# Patient Record
Sex: FEMALE | Race: WHITE | NOT HISPANIC OR LATINO | Employment: FULL TIME | URBAN - METROPOLITAN AREA
[De-identification: names, ages, dates, MRNs, and addresses within clinical notes are randomized per-mention and may not be internally consistent; named-entity substitution may affect disease eponyms.]

---

## 2017-01-24 ENCOUNTER — ALLSCRIPTS OFFICE VISIT (OUTPATIENT)
Dept: OTHER | Facility: OTHER | Age: 62
End: 2017-01-24

## 2017-04-21 ENCOUNTER — ALLSCRIPTS OFFICE VISIT (OUTPATIENT)
Dept: OTHER | Facility: OTHER | Age: 62
End: 2017-04-21

## 2017-05-08 ENCOUNTER — GENERIC CONVERSION - ENCOUNTER (OUTPATIENT)
Dept: OTHER | Facility: OTHER | Age: 62
End: 2017-05-08

## 2017-05-10 ENCOUNTER — ALLSCRIPTS OFFICE VISIT (OUTPATIENT)
Dept: OTHER | Facility: OTHER | Age: 62
End: 2017-05-10

## 2017-07-05 ENCOUNTER — ALLSCRIPTS OFFICE VISIT (OUTPATIENT)
Dept: OTHER | Facility: OTHER | Age: 62
End: 2017-07-05

## 2017-07-09 ENCOUNTER — GENERIC CONVERSION - ENCOUNTER (OUTPATIENT)
Dept: OTHER | Facility: OTHER | Age: 62
End: 2017-07-09

## 2017-07-26 ENCOUNTER — TRANSCRIBE ORDERS (OUTPATIENT)
Dept: ADMINISTRATIVE | Facility: HOSPITAL | Age: 62
End: 2017-07-26

## 2017-07-26 DIAGNOSIS — M54.41 ACUTE BACK PAIN WITH SCIATICA, RIGHT: Primary | ICD-10-CM

## 2017-07-31 ENCOUNTER — HOSPITAL ENCOUNTER (OUTPATIENT)
Dept: CT IMAGING | Facility: HOSPITAL | Age: 62
Discharge: HOME | End: 2017-07-31
Payer: COMMERCIAL

## 2017-07-31 ENCOUNTER — GENERIC CONVERSION - ENCOUNTER (OUTPATIENT)
Dept: OTHER | Facility: OTHER | Age: 62
End: 2017-07-31

## 2017-07-31 DIAGNOSIS — M54.41 LOW BACK PAIN WITH RIGHT-SIDED SCIATICA: ICD-10-CM

## 2017-07-31 DIAGNOSIS — M54.41 ACUTE BACK PAIN WITH SCIATICA, RIGHT: ICD-10-CM

## 2017-07-31 PROCEDURE — 72131 CT LUMBAR SPINE W/O DYE: CPT

## 2017-08-21 ENCOUNTER — APPOINTMENT (OUTPATIENT)
Dept: PHYSICAL THERAPY | Facility: REHABILITATION | Age: 62
End: 2017-08-21
Payer: COMMERCIAL

## 2017-08-21 DIAGNOSIS — M54.41 LOW BACK PAIN WITH RIGHT-SIDED SCIATICA: ICD-10-CM

## 2017-08-21 PROCEDURE — G8978 MOBILITY CURRENT STATUS: HCPCS | Performed by: PHYSICAL THERAPIST

## 2017-08-21 PROCEDURE — 97110 THERAPEUTIC EXERCISES: CPT

## 2017-08-21 PROCEDURE — 97162 PT EVAL MOD COMPLEX 30 MIN: CPT

## 2017-08-21 PROCEDURE — G8979 MOBILITY GOAL STATUS: HCPCS | Performed by: PHYSICAL THERAPIST

## 2017-08-21 PROCEDURE — 97012 MECHANICAL TRACTION THERAPY: CPT

## 2017-08-23 ENCOUNTER — APPOINTMENT (OUTPATIENT)
Dept: PHYSICAL THERAPY | Facility: REHABILITATION | Age: 62
End: 2017-08-23
Payer: COMMERCIAL

## 2017-08-23 PROCEDURE — 97110 THERAPEUTIC EXERCISES: CPT

## 2017-08-23 PROCEDURE — 97012 MECHANICAL TRACTION THERAPY: CPT

## 2017-08-28 ENCOUNTER — APPOINTMENT (OUTPATIENT)
Dept: PHYSICAL THERAPY | Facility: REHABILITATION | Age: 62
End: 2017-08-28
Payer: COMMERCIAL

## 2017-09-19 ENCOUNTER — GENERIC CONVERSION - ENCOUNTER (OUTPATIENT)
Dept: OTHER | Facility: OTHER | Age: 62
End: 2017-09-19

## 2017-11-15 ENCOUNTER — ALLSCRIPTS OFFICE VISIT (OUTPATIENT)
Dept: OTHER | Facility: OTHER | Age: 62
End: 2017-11-15

## 2017-11-17 NOTE — PROGRESS NOTES
Assessment    1  Hypertension (401 9) (I10)   2  Walking pneumonia (486) (J18 9)   3  Bilateral impacted cerumen (380 4) (H61 23)    Plan  Bilateral impacted cerumen    · Removal Impacted Cerumen Using Irrigation / Lavage one or both ears - POC;Status:Complete;   Done: 51DIT3496  Hypertension    · Metoprolol Succinate ER 25 MG Oral Tablet Extended Release 24 Hour (Toprol XL)   · Lisinopril 10 MG Oral Tablet; TAKE 1 TABLET DAILY   · Follow-up visit in 2 weeks Evaluation and Treatment  Follow-up  Status: Hold For -Scheduling  Requested for: 19PDN2328  Walking pneumonia    · Amoxicillin 500 MG Oral Capsule; TAKE 1 CAPSULE TWICE DAILY UNTIL GONE   · Promethazine-Codeine 6 25-10 MG/5ML Oral Syrup; TAKE 5 ML EVERY 4 TO 6HOURS AS NEEDED FOR COUGH    Discussion/Summary    Antibiotics prescribed and cough medicationchanged medication from metoprolol to lisinopril  follow up in 2 weeks  BP log given  Possible side effects of new medications were reviewed with the patient/guardian today  The treatment plan was reviewed with the patient/guardian  The patient/guardian understands and agrees with the treatment plan      Chief Complaint  Patient c/o clogged ear, cough, headache  difficulty sleeping  History of Present Illness  HPI: anxious because new baby is on the way and she feels lightheaded as well   Cough:   Arcelia Bennett presents with complaints of sudden onset of moderate cough, described as moist starting 1 week ago Symptoms are unchanged (she was told it was viral, cant take nyquil, benadryl because it makes her hyper  )  Associated symptoms include pleuritic chest pain, but-- no fever,-- no sore throat,-- no chest pain-- and-- no night sweats  Review of Systems   Cardiovascular: no chest pain,-- the heart rate was not fast-- and-- no palpitations  Respiratory: cough, but-- as noted in HPI  Neurological: headache-- and-- dizziness  ROS reviewed  Active Problems    1   Acute right-sided low back pain with right-sided sciatica (724 2,724 3) (M54 41)   2  Allergic reaction (995 3) (T78 40XA)   3  Anxiety (300 00) (F41 9)   4  Cold sore (054 9) (B00 1)   5  Encounter for routine gynecological examination (V72 31) (Z01 419)   6  Encounter for screening for malignant neoplasm of colon (V76 51) (Z12 11)   7  Encounter for screening mammogram for malignant neoplasm of breast (V76 12) (Z12 31)   8  Hypertension (401 9) (I10)   9  Need for Tdap vaccination (V06 1) (Z23)   10  Other insomnia (780 52) (G47 09)   11  Panic attack (300 01) (F41 0)   12  Spinal stenosis of lumbar region (724 02) (M48 061)    Past Medical History  1  History of Denial Of Any Significant Medical History  Active Problems And Past Medical History Reviewed: The active problems and past medical history were reviewed and updated today  Family History  Mother    1  Family history of Atrial Myocardial Infarction  Family History Reviewed: The family history was reviewed and updated today  Social History   · Being A Social Drinker   · Current Every Day Smoker (305 1)   · Denied: History of Drug Use   · Full-time employment   · Denied: History of domestic violence   · House   · Lives with family   ·    · No advance directives (V49 89) (Z78 9)   · No living will   · [employer]   · Supportive and safe   · Two children   · 1 grandchild  The social history was reviewed and is unchanged  Surgical History    1  History of  Section   2  History of Tonsillectomy  Surgical History Reviewed: The surgical history was reviewed and updated today  Current Meds   1  Metoprolol Succinate ER 25 MG Oral Tablet Extended Release 24 Hour; TAKE 1 TABLET DAILY; Therapy: 73IGZ5501 to (Pretty Esquivel)  Requested for: 85QBC7706; Last Rx:27Hze1860 Ordered    The medication list was reviewed and updated today  Allergies  1  Gabapentin CAPS   2   Benadryl Allergy TABS    Vitals   Recorded: 44EHS2671 06:06PM   Temperature 98 7 F Heart Rate 84   Respiration 18   Systolic 334   Diastolic 505   Height 5 ft 6 in   Weight 152 lb 9 6 oz   BMI Calculated 24 63   BSA Calculated 1 79       Physical Exam   Constitutional  General appearance: No acute distress, well appearing and well nourished  Psychiatric  Orientation to person, place, and time: Normal    Mood and affect: Abnormal   Mood and Affect: anxious  Results/Data  PHQ-9 Adult Depression Screening 62QDE6473 06:11PM User, Lyudmilas     Test Name Result Flag Reference   PHQ-9 Adult Depression Score 9       Over the last two weeks, how often have you been bothered by any of the following problems? Little interest or pleasure in doing things: Several days - 1 Feeling down, depressed, or hopeless: Several days - 1 Trouble falling or staying asleep, or sleeping too much: Nearly every day - 3 Feeling tired or having little energy: More than half the days - 2 Poor appetite or over eating: More than half the days - 2 Feeling bad about yourself - or that you are a failure or have let yourself or your family down: Not at all - 0 Trouble concentrating on things, such as reading the newspaper or watching television: Not at all - 0 Moving or speaking so slowly that other people could have noticed  Or the opposite -  being so fidgety or restless that you have been moving around a lot more than usual: Not at all - 0 Thoughts that you would be better off dead, or of hurting yourself in some way: Not at all - 0   PHQ-9 Adult Depression Screening Negative     PHQ-9 Difficulty Level Not difficult at all     PHQ-9 Severity Mild Depression           Procedure           Procedure: cerumen removal   Indication: cerumen impaction in both ears  Prep: hydrogen peroxide was placed in the canal prior to the procedure  Procedure Note: The procedure was performed by MA  A otoscope was placed in the ear canal(s) to visualize the ear canal debris  The ear was cleaned by using warm water irrigation   The procedure was partially successful  Post-Procedure:  Patient Status: the patient tolerated the procedure well  Complications: there were no complications  Patient instructions: avoid using q-tips-- and-- use debrox drops for additional removal             Future Appointments    Date/Time Provider Specialty Site   11/29/2017 04:30 PM Sudhir Chamorro[address]       Signatures   Electronically signed by : Ruth Corbin; Nov 15 2017  6:36PM EST                       (Author)    Electronically signed by :  Sivakumar Bauer MD; Nov 16 2017  8:16AM EST                       (Author)

## 2017-11-29 ENCOUNTER — GENERIC CONVERSION - ENCOUNTER (OUTPATIENT)
Dept: OTHER | Facility: OTHER | Age: 62
End: 2017-11-29

## 2017-12-06 ENCOUNTER — GENERIC CONVERSION - ENCOUNTER (OUTPATIENT)
Dept: OTHER | Facility: OTHER | Age: 62
End: 2017-12-06

## 2017-12-07 ENCOUNTER — HOSPITAL ENCOUNTER (OUTPATIENT)
Dept: RADIOLOGY | Facility: HOSPITAL | Age: 62
Discharge: HOME | End: 2017-12-07
Payer: COMMERCIAL

## 2017-12-07 ENCOUNTER — APPOINTMENT (OUTPATIENT)
Dept: LAB | Facility: HOSPITAL | Age: 62
End: 2017-12-07
Payer: COMMERCIAL

## 2017-12-07 DIAGNOSIS — R05.9 COUGH: ICD-10-CM

## 2017-12-07 DIAGNOSIS — J18.9 PNEUMONIA: ICD-10-CM

## 2017-12-07 LAB
ALBUMIN SERPL BCP-MCNC: 3.7 G/DL (ref 3.5–5)
ALP SERPL-CCNC: 60 U/L (ref 46–116)
ALT SERPL W P-5'-P-CCNC: 20 U/L (ref 12–78)
ANION GAP SERPL CALCULATED.3IONS-SCNC: 8 MMOL/L (ref 4–13)
AST SERPL W P-5'-P-CCNC: 10 U/L (ref 5–45)
BASOPHILS # BLD AUTO: 0.02 THOUSANDS/ΜL (ref 0–0.1)
BASOPHILS NFR BLD AUTO: 0 % (ref 0–1)
BILIRUB SERPL-MCNC: 0.36 MG/DL (ref 0.2–1)
BUN SERPL-MCNC: 16 MG/DL (ref 5–25)
CALCIUM SERPL-MCNC: 9.2 MG/DL (ref 8.3–10.1)
CHLORIDE SERPL-SCNC: 105 MMOL/L (ref 100–108)
CO2 SERPL-SCNC: 26 MMOL/L (ref 21–32)
CREAT SERPL-MCNC: 0.58 MG/DL (ref 0.6–1.3)
EOSINOPHIL # BLD AUTO: 0.1 THOUSAND/ΜL (ref 0–0.61)
EOSINOPHIL NFR BLD AUTO: 2 % (ref 0–6)
ERYTHROCYTE [DISTWIDTH] IN BLOOD BY AUTOMATED COUNT: 12.2 % (ref 11.6–15.1)
GFR SERPL CREATININE-BSD FRML MDRD: 101 ML/MIN/1.73SQ M
GLUCOSE SERPL-MCNC: 99 MG/DL (ref 65–140)
HCT VFR BLD AUTO: 41.5 % (ref 34.8–46.1)
HGB BLD-MCNC: 14.2 G/DL (ref 11.5–15.4)
LYMPHOCYTES # BLD AUTO: 2.01 THOUSANDS/ΜL (ref 0.6–4.47)
LYMPHOCYTES NFR BLD AUTO: 30 % (ref 14–44)
MCH RBC QN AUTO: 32.5 PG (ref 26.8–34.3)
MCHC RBC AUTO-ENTMCNC: 34.2 G/DL (ref 31.4–37.4)
MCV RBC AUTO: 95 FL (ref 82–98)
MONOCYTES # BLD AUTO: 0.41 THOUSAND/ΜL (ref 0.17–1.22)
MONOCYTES NFR BLD AUTO: 6 % (ref 4–12)
NEUTROPHILS # BLD AUTO: 4.17 THOUSANDS/ΜL (ref 1.85–7.62)
NEUTS SEG NFR BLD AUTO: 62 % (ref 43–75)
NRBC BLD AUTO-RTO: 0 /100 WBCS
PLATELET # BLD AUTO: 220 THOUSANDS/UL (ref 149–390)
PMV BLD AUTO: 9.7 FL (ref 8.9–12.7)
POTASSIUM SERPL-SCNC: 3.7 MMOL/L (ref 3.5–5.3)
PROT SERPL-MCNC: 6.8 G/DL (ref 6.4–8.2)
RBC # BLD AUTO: 4.37 MILLION/UL (ref 3.81–5.12)
SODIUM SERPL-SCNC: 139 MMOL/L (ref 136–145)
WBC # BLD AUTO: 6.71 THOUSAND/UL (ref 4.31–10.16)

## 2017-12-07 PROCEDURE — 80053 COMPREHEN METABOLIC PANEL: CPT

## 2017-12-07 PROCEDURE — 71020 HB CHEST X-RAY 2VW FRONTAL&LATL: CPT

## 2017-12-07 PROCEDURE — 36415 COLL VENOUS BLD VENIPUNCTURE: CPT

## 2017-12-07 PROCEDURE — 85025 COMPLETE CBC W/AUTO DIFF WBC: CPT

## 2017-12-08 ENCOUNTER — GENERIC CONVERSION - ENCOUNTER (OUTPATIENT)
Dept: OTHER | Facility: OTHER | Age: 62
End: 2017-12-08

## 2018-01-02 ENCOUNTER — GENERIC CONVERSION - ENCOUNTER (OUTPATIENT)
Dept: OTHER | Facility: OTHER | Age: 63
End: 2018-01-02

## 2018-01-08 DIAGNOSIS — J18.9 PNEUMONIA: ICD-10-CM

## 2018-01-10 NOTE — MISCELLANEOUS
Message  Return to work or school:   Tito Smith is under my professional care   She was seen in my office on 1/24/17   She is able to return to work on  1/28/17            Signatures   Electronically signed by : Rahul Ng DO; Jan 24 2017  1:20PM EST                       (Author)    Electronically signed by : Rahul Ng DO; Jan 31 2017  8:25PM EST                       (Author)

## 2018-01-12 NOTE — MISCELLANEOUS
Message  Return to work or school:   Marina Montoya is under my professional care  She was seen in my office on 6/15/16   She is able to return to work on  6/20/16            Signatures   Electronically signed by :  Dionne Ordonez MD; Jun 16 2016  9:52AM EST                       (Author)

## 2018-01-13 VITALS
TEMPERATURE: 97.6 F | RESPIRATION RATE: 18 BRPM | OXYGEN SATURATION: 98 % | BODY MASS INDEX: 24.11 KG/M2 | DIASTOLIC BLOOD PRESSURE: 100 MMHG | WEIGHT: 150 LBS | SYSTOLIC BLOOD PRESSURE: 148 MMHG | HEIGHT: 66 IN | HEART RATE: 74 BPM

## 2018-01-13 VITALS
DIASTOLIC BLOOD PRESSURE: 110 MMHG | HEART RATE: 84 BPM | HEIGHT: 66 IN | SYSTOLIC BLOOD PRESSURE: 176 MMHG | BODY MASS INDEX: 24.53 KG/M2 | TEMPERATURE: 98.7 F | WEIGHT: 152.6 LBS | RESPIRATION RATE: 18 BRPM

## 2018-01-13 VITALS
DIASTOLIC BLOOD PRESSURE: 74 MMHG | BODY MASS INDEX: 23.59 KG/M2 | SYSTOLIC BLOOD PRESSURE: 124 MMHG | WEIGHT: 146.8 LBS | TEMPERATURE: 98.5 F | HEIGHT: 66 IN | HEART RATE: 82 BPM

## 2018-01-13 VITALS
SYSTOLIC BLOOD PRESSURE: 150 MMHG | RESPIRATION RATE: 16 BRPM | DIASTOLIC BLOOD PRESSURE: 96 MMHG | HEIGHT: 66 IN | WEIGHT: 148 LBS | HEART RATE: 86 BPM | BODY MASS INDEX: 23.78 KG/M2

## 2018-01-13 NOTE — RESULT NOTES
Verified Results  * CT SPINE LUMBAR WO CONTRAST 51PKQ4998 06:35PM Dion Vargas     Test Name Result Flag Reference   CT SPINE LUMBAR WO CONTRAST (Report)     CT LUMBAR SPINE     INDICATION: M54 41: Lumbago with sciatica, right side  History taken directly from the electronic ordering system  COMPARISON: None available     TECHNIQUE: Contiguous axial images through the lumbar spine were obtained  Sagittal and coronal reconstructions were performed  Radiation dose length product (DLP) for this visit: 526 mGy-cm   This examination, like all CT scans performed in the [facility], was performed utilizing techniques to minimize radiation dose exposure, including the use of iterative    reconstruction and automated exposure control  IMAGE QUALITY: Diagnostic  FINDINGS:   Counting reference: Lumbosacral Junction For the purposes of this report, L4-5 is considered the level of the iliac crest       ALIGNMENT: No focal lytic or blastic lesion  There is no acute fracture  Vertebral body hemangioma seen within L3  Vertebral body heights are maintained  No evidence of prior surgical intervention  VERTEBRAL BODIES:        DEGENERATIVE CHANGES:     Lower Thoracic spine: Normal lower thoracic disc spaces ]     L1-2: No significant spinal canal stenosis  No right and no left neural foraminal stenosis  L2-3: Right foraminal/extra foraminal protrusion  No significant spinal canal stenosis  Mild right and no significant left neural foraminal stenosis  L3-4: Circumferential disc bulge  No significant spinal canal stenosis  Mild right and mild left neural foraminal stenosis  L4-5: Bulging of the annulus  Right foraminal extrusion with superior migration  Moderate right and mild left neural foraminal stenosis  L5-S1: Bulging of the annulus  Ligament flavum thickening  No significant spinal canal stenosis  Mild right and mild to moderate left neural foraminal stenosis  PARASPINAL SOFT TISSUES:  Normal        IMPRESSION:     Multilevel spondylosis as detailed level by level, most significant for the following:     Right foraminal and extraforaminal protrusion at L2-L3 resulting in mild right foraminal stenosis  Right foraminal extrusion at L4-L5 resulting in moderate right foraminal stenosis  Otherwise, mild degrees of stenosis as detailed level by level  Workstation performed: [id number]     Signed by:   Lucretia Hampton MD   8/1/17       Plan  Acute right-sided low back pain with right-sided sciatica, Spinal stenosis of lumbar region    · *1 -  [facility]  *  Status: Active  Requested for:  86Nzt1931  Care Summary provided  : Yes  Allergic reaction    · MethylPREDNISolone 4 MG Oral Tablet Therapy Pack (Medrol);  Take as directed  on pack

## 2018-01-14 VITALS
DIASTOLIC BLOOD PRESSURE: 90 MMHG | HEART RATE: 86 BPM | BODY MASS INDEX: 23.36 KG/M2 | WEIGHT: 145.38 LBS | OXYGEN SATURATION: 98 % | HEIGHT: 66 IN | RESPIRATION RATE: 16 BRPM | SYSTOLIC BLOOD PRESSURE: 130 MMHG

## 2018-01-16 NOTE — MISCELLANEOUS
Message  pt called today and is having increased difficulty with rt leg and back pain in spite of steroids and exercise program  Rt leg pain and tingling persists   not able to sit to work or get good sleep  will attempt ct lumbar spine to evaluate lumbar spine for abnormality      Signatures   Electronically signed by : Yumi Fernandez DO; Jul 25 2017  3:20PM EST                       (Author)

## 2018-01-18 NOTE — PROGRESS NOTES
Assessment    1  Encounter for preventive health examination (V70 0) (Z00 00)    Plan  Anxiety, Health Maintenance    · (1) LIPID PANEL FASTING W DIRECT LDL REFLEX; Status:Active; Requested  for:01Xgy2820; Health Maintenance    · (1) COMPREHENSIVE METABOLIC PANEL; Status:Active; Requested for:68Lhx7229;    · (1) C-REACTIVE PROTEIN, HIGH SENSITIVITY; Status:Active; Requested  for:12Ile9575; Health Maintenance, Other insomnia    · (1) TSH; Status:Active; Requested for:56Nup2788;     Discussion/Summary  healthy adult female Currently, she eats an adequate diet and has an adequate exercise regimen  cervical cancer screening is current Breast cancer screening: mammogram has been ordered  Colorectal cancer screening: colorectal cancer screening is current  Osteoporosis screening: bone mineral density testing is not indicated  The immunizations are up to date  Advice and education were given regarding aerobic exercise, weight bearing exercise, calcium supplements, vitamin D supplements, cardiovascular risk reduction, tobacco cessation, sunscreen use, self skin examination, seat belt use and fall risk reduction  Await lab  Chief Complaint  pt is here for routine PE -brother-in-law passed of heart attack in June      Advance Directives  Advance Directive [facility]:   The patient is not in agreement to receive the Advance Care Planning service   Capacity/Competence: This patient has full decision making capacity for discussion of advance care planning and This patient has full decision making competency for discussion of advance care planning  Summary of Advance Directive Conversation  declines at this time  History of Present Illness  HM, Adult Female: The patient is being seen for a health maintenance evaluation  The last health maintenance visit was 2 year(s) ago  Social History: Household members include spouse  She is   Work status: working full time   The patient is a current cigarette smoker  She reports frequent alcohol use and drinking 2 drinks per day  She has never used illicit drugs  General Health: The patient's health since the last visit is described as good  She has regular dental visits  Immunizations status: up to date  Lifestyle:  She does not have a healthy diet  She has weight concerns  She exercises regularly  Exercise includes volleyball  Reproductive health: the patient is postmenopausal   she is sexually active  pregnancy history: G 2P 2  Screening: cancer screening reviewed and current  Cervical cancer screening includes a pap smear performed 2014  Breast cancer screening includes a mammogram performed last year  Colorectal cancer screening includes a colonoscopy performed within the past ten years  (2014)  metabolic screening reviewed and current  Metabolic screening includes no previous lipid profile, no previous glucose screening and no previous thyroid function test    risk screening reviewed and current  Safety elements used: seat belt, safe driving habits, sunscreen and smoke detector, but no carbon monoxide detector  Risk assessments performed include depression symptoms  Risk findings: no guns at home, no travel to developing areas and no tuberculosis exposure  Review of Systems    Constitutional: recent several pounds lb weight gain and feeling tired, but not feeling poorly  Cardiovascular: palpitations, but no chest pain  Respiratory: shortness of breath  Neurological: no headache  Psychiatric: anxiety and sleep disturbances  Active Problems    1  Acute URI (465 9) (J06 9)   2  Cold sore (054 9) (B00 1)   3  Diarrhea (787 91) (R19 7)   4  Encounter for routine gynecological examination (V72 31) (Z01 419)   5  Encounter for screening for malignant neoplasm of colon (V76 51) (Z12 11)   6  Encounter for screening mammogram for malignant neoplasm of breast (V76 12)   (Z12 31)   7  Need for Tdap vaccination (V06 1) (Z23)   8   Other insomnia (780 52) (G47 09)    Past Medical History    · History of Denial Of Any Significant Medical History    Surgical History    · History of  Section   · History of Tonsillectomy    Family History  Mother    · Family history of Atrial Myocardial Infarction    Social History    · Being A Social Drinker   · Current Every Day Smoker (305 1)   · Denied: History of Drug Use   · Full-time employment   · Denied: History of domestic violence   ·    · Two children   · 1 grandchild    Current Meds   1  No Reported Medications Recorded    Allergies    1  No Known Drug Allergies    Vitals   Recorded: 13WQC1982 85:43UP   Systolic 723   Diastolic 70   Heart Rate 78   Height 5 ft 6 in   Weight 152 lb 6 08 oz   BMI Calculated 24 59   BSA Calculated 1 79     Physical Exam    Constitutional   General appearance: No acute distress, well appearing and well nourished  Ears, Nose, Mouth, and Throat   Otoscopic examination: Tympanic membranes translucent with normal light reflex  Canals patent without erythema  Oropharynx: Normal with no erythema, edema, exudate or lesions  Neck   Thyroid: Normal, no thyromegaly  Pulmonary   Auscultation of lungs: Clear to auscultation  Cardiovascular   Auscultation of heart: Normal rate and rhythm, normal S1 and S2, no murmurs  Lymphatic   Palpation of lymph nodes in neck: No lymphadenopathy  Signatures   Electronically signed by :  Tanya Manzano MD; Aug 22 2016  8:09AM EST                       (Author)

## 2018-01-22 VITALS
SYSTOLIC BLOOD PRESSURE: 140 MMHG | HEART RATE: 78 BPM | WEIGHT: 151.13 LBS | BODY MASS INDEX: 24.29 KG/M2 | TEMPERATURE: 98.3 F | DIASTOLIC BLOOD PRESSURE: 90 MMHG | HEIGHT: 66 IN | RESPIRATION RATE: 18 BRPM

## 2018-01-23 NOTE — PROGRESS NOTES
Assessment   1  Viral syndrome (079 99) (B34 9)    Plan  Acute URI    · Renew: Promethazine-Codeine 6 25-10 MG/5ML Oral Syrup; TAKE 5 - 10 ML EVERY 4 TO  6 HOURS AS NEEDED1      1 Amended By: Timo Asher; Jan 31 2017 8:24 PM EST    Chief Complaint  head and chest congestion for the past week along with cough & sore throat/vomiting and diarrhea since last night      History of Present Illness  HPI: past week cough congestion sore throat last night vomiting and diarrhea      Review of Systems    Constitutional: feeling poorly  ENT: no ear ache, no loss of hearing, no nosebleeds or nasal discharge, no sore throat or hoarseness  Cardiovascular: no complaints of slow or fast heart rate, no chest pain, no palpitations, no leg claudication or lower extremity edema  Respiratory: no complaints of shortness of breath, no wheezing, no dyspnea on exertion, no orthopnea or PND  Breasts: no complaints of breast pain, breast lump or nipple discharge  Gastrointestinal: as noted in HPI  Genitourinary: no complaints of dysuria, no incontinence, no pelvic pain, no dysmenorrhea, no vaginal discharge or abnormal vaginal bleeding  Musculoskeletal: no complaints of arthralgia, no myalgia, no joint swelling or stiffness, no limb pain or swelling  Integumentary: no complaints of skin rash or lesion, no itching or dry skin, no skin wounds  Neurological: no complaints of headache, no confusion, no numbness or tingling, no dizziness or fainting  Active Problems   1  Acute URI (465 9) (J06 9)  2  Anxiety (300 00) (F41 9)  3  Cold sore (054 9) (B00 1)  4  Diarrhea (787 91) (R19 7)  5  Encounter for routine gynecological examination (V72 31) (Z01 419)  6  Encounter for screening for malignant neoplasm of colon (V76 51) (Z12 11)  7  Encounter for screening mammogram for malignant neoplasm of breast (V76 12)   (Z12 31)  8  Need for Tdap vaccination (V06 1) (Z23)  9   Other insomnia (780 52) (G47 09)    Past Medical History 1  History of Denial Of Any Significant Medical History  Active Problems And Past Medical History Reviewed: The active problems and past medical history were reviewed and updated today  Family History  Mother   1  Family history of Atrial Myocardial Infarction    Social History    · Being A Social Drinker   · Current Every Day Smoker (305 1)   · Denied: History of Drug Use   · Full-time employment   · Denied: History of domestic violence   · House   · Lives with family   ·    · No advance directives (V49 89) (Z78 9)   · No living will   · [employer]   · Supportive and safe   · Two children   · 1 grandchild  The social history was reviewed and updated today  The social history was reviewed and is unchanged  Surgical History   1  History of  Section  2  History of Tonsillectomy    Current Meds  1  No Reported Medications Recorded    The medication list was reviewed and updated today  Allergies   1  No Known Drug Allergies    Vitals   Recorded: 67LMJ4673 12:26PM   Temperature 98 5 F   Heart Rate 82   Systolic 000   Diastolic 74   Height 5 ft 6 in   Weight 146 lb 12 8 oz   BMI Calculated 23 69   BSA Calculated 1 76     Physical Exam    Constitutional1    General appearance: No acute distress, well appearing and well nourished  1    Ears, Nose, Mouth, and Throat1    Otoscopic examination: Tympanic membranes translucent with normal light reflex  Canals patent without erythema  1    Oropharynx: Normal with no erythema, edema, exudate or lesions  1    Pulmonary1    Auscultation of lungs: Clear to auscultation  1    Cardiovascular1    Auscultation of heart: Normal rate and rhythm, normal S1 and S2, without murmurs  1    Abdomen1    Abdomen: Non-tender, no masses  1    Lymphatic1    Palpation of lymph nodes in neck: No lymphadenopathy  1          1 Amended By: Wong Cole; 2017 8:25 PM EST    Message  Return to work or school:   Grant Vigil is under my professional care   She was seen in my office on 1/24/17   She is able to return to work on  1/28/17            Signatures   Electronically signed by : Miguel Angel Alaniz DO; Jan 31 2017  8:25PM EST                       (Author)

## 2018-01-23 NOTE — RESULT NOTES
Verified Results  (1) CBC/PLT/DIFF [id number] 02:08PM Aidan Friedman Order Number: [id number]     Test Name Result Flag Reference   WBC COUNT 6 71 Thousand/uL  4 31-10 16   RBC COUNT 4 37 Million/uL  3 81-5 12   HEMOGLOBIN 14 2 g/dL  11 5-15 4   HEMATOCRIT 41 5 %  34 8-46  1   MCV 95 fL  82-98   MCH 32 5 pg  26 8-34 3   MCHC 34 2 g/dL  31 4-37 4   RDW 12 2 %  11 6-15 1   MPV 9 7 fL  8 9-12 7   PLATELET COUNT 229 Thousands/uL  149-390   nRBC AUTOMATED 0 /100 WBCs     NEUTROPHILS RELATIVE PERCENT 62 %  43-75   LYMPHOCYTES RELATIVE PERCENT 30 %  14-44   MONOCYTES RELATIVE PERCENT 6 %  4-12   EOSINOPHILS RELATIVE PERCENT 2 %  0-6   BASOPHILS RELATIVE PERCENT 0 %  0-1   NEUTROPHILS ABSOLUTE COUNT 4 17 Thousands/? ??L  1 85-7 62   LYMPHOCYTES ABSOLUTE COUNT 2 01 Thousands/? ??L  0 60-4 47   MONOCYTES ABSOLUTE COUNT 0 41 Thousand/? ??L  0 17-1 22   EOSINOPHILS ABSOLUTE COUNT 0 10 Thousand/? ??L  0 00-0 61   BASOPHILS ABSOLUTE COUNT 0 02 Thousands/? ??L  0 00-0 10     (1) COMPREHENSIVE METABOLIC PANEL 74WIK0400 66:64NQ Aidan Friedman Order Number: [id number]     Test Name Result Flag Reference   GLUCOSE,RANDM 99 mg/dL     If the patient is fasting, the ADA then defines impaired fasting glucose as > 100 mg/dL and diabetes as > or equal to 123 mg/dL  Specimen collection should occur prior to Sulfasalazine administration due to the potential for falsely depressed results  Specimen collection should occur prior to Sulfapyridine administration due to the potential for falsely elevated results     SODIUM 139 mmol/L  136-145   POTASSIUM 3 7 mmol/L  3 5-5 3   CHLORIDE 105 mmol/L  100-108   CARBON DIOXIDE 26 mmol/L  21-32   ANION GAP (CALC) 8 mmol/L  4-13   BLOOD UREA NITROGEN 16 mg/dL  5-25   CREATININE 0 58 mg/dL L 0 60-1 30   Standardized to IDMS reference method   CALCIUM 9 2 mg/dL  8 3-10 1   BILI, TOTAL 0 36 mg/dL  0 20-1 00   ALK PHOSPHATAS 60 U/L     ALT (SGPT) 20 U/L  12-78   Specimen collection should occur prior to Sulfasalazine administration due to the potential for falsely depressed results  AST(SGOT) 10 U/L  5-45   Specimen collection should occur prior to Sulfasalazine administration due to the potential for falsely depressed results  ALBUMIN 3 7 g/dL  3 5-5 0   TOTAL PROTEIN 6 8 g/dL  6 4-8 2   eGFR 101 ml/min/1 73sq m     [facility] Disease Education Program recommendations are as follows:  GFR calculation is accurate only with a steady state creatinine  Chronic Kidney disease less than 60 ml/min/1 73 sq  meters  Kidney failure less than 15 ml/min/1 73 sq  meters  * XR CHEST PA & LATERAL [id number] 01:52PM Leni Hudson Order Number: [id number]     Test Name Result Flag Reference   XR CHEST PA & LATERAL (Report)     CHEST      INDICATION: R05: Cough  History taken directly from the electronic ordering system  COMPARISON: None     VIEWS: Frontal and lateral projections     IMAGES: 2     FINDINGS:        Cardiac silhouette appears unremarkable  Thoracic aorta is tortuous  Mild focal haziness at the right lung base on the PA view without correlative finding on the lateral view, developing infiltrate is not excluded  Mild linear atelectasis at the left lung base  No pneumothorax or pleural effusion  Visualized osseous structures appear within normal limits for the patient's age  IMPRESSION:     Mild focal haziness at the right lung base on the PA view without correlative finding on the lateral view, developing infiltrate is not excluded  Workstation performed: [id number]     Signed by:   Ashia Lyon MD   12/7/17       Plan  Acute recurrent maxillary sinusitis    · Cefuroxime Axetil 500 MG Oral Tablet   · MethylPREDNISolone 4 MG Oral Tablet Therapy Pack  Walking pneumonia    · LevoFLOXacin 500 MG Oral Tablet (Levaquin); TAKE 1 TABLET DAILY   · * XR CHEST PA & LATERAL; Status:Active;  Requested for:59Hbk7703;

## 2018-01-24 VITALS
TEMPERATURE: 97.6 F | SYSTOLIC BLOOD PRESSURE: 128 MMHG | HEIGHT: 66 IN | RESPIRATION RATE: 18 BRPM | WEIGHT: 149.5 LBS | BODY MASS INDEX: 24.03 KG/M2 | HEART RATE: 90 BPM | DIASTOLIC BLOOD PRESSURE: 80 MMHG

## 2018-01-30 ENCOUNTER — OFFICE VISIT (OUTPATIENT)
Dept: PULMONOLOGY | Facility: CLINIC | Age: 63
End: 2018-01-30

## 2018-02-02 DIAGNOSIS — I10 HYPERTENSION, UNSPECIFIED TYPE: Primary | ICD-10-CM

## 2018-02-02 RX ORDER — METHYLPREDNISOLONE 4 MG/1
TABLET ORAL
COMMUNITY
Start: 2017-11-29 | End: 2018-06-28

## 2018-02-02 RX ORDER — LISINOPRIL 20 MG/1
1 TABLET ORAL DAILY
COMMUNITY
Start: 2017-11-15 | End: 2018-02-02 | Stop reason: SDUPTHER

## 2018-02-02 RX ORDER — PREDNISONE 20 MG/1
TABLET ORAL
COMMUNITY
End: 2018-06-28

## 2018-02-05 RX ORDER — LISINOPRIL 20 MG/1
20 TABLET ORAL DAILY
Qty: 90 TABLET | Refills: 3 | Status: SHIPPED | OUTPATIENT
Start: 2018-02-05 | End: 2018-10-05 | Stop reason: SINTOL

## 2018-03-29 ENCOUNTER — TELEPHONE (OUTPATIENT)
Dept: FAMILY MEDICINE CLINIC | Facility: CLINIC | Age: 63
End: 2018-03-29

## 2018-03-29 DIAGNOSIS — N64.4 BREAST PAIN, RIGHT: Primary | ICD-10-CM

## 2018-03-29 DIAGNOSIS — Z12.31 ENCOUNTER FOR SCREENING MAMMOGRAM FOR MALIGNANT NEOPLASM OF BREAST: ICD-10-CM

## 2018-03-29 DIAGNOSIS — N64.4 BREAST PAIN: Primary | ICD-10-CM

## 2018-03-29 NOTE — TELEPHONE ENCOUNTER
Orders entered for patient  Spoke to pt and notified her of what tests were being ordered  She asked for the orders to be faxed to her place of employment @ [phone]  I gave her central scheduling's number to schedule with the [address]  She stated she didn't want to go there she wanted to continue to go to [facility] where she's been going because she likes it there  She is going to see if they can do all imaging there and if not then she will contact the regional breast [place]

## 2018-03-29 NOTE — TELEPHONE ENCOUNTER
Would need diagnostic mammo r with r breast ultrasound and routine mammo l-would need to go to [address] [facility]

## 2018-03-29 NOTE — TELEPHONE ENCOUNTER
Patient is requesting a referral to get her mammogram done  Patient stated she is having right breast pain and last night it was very hard to the touch  Per patient she called the breast center but they told her to call her PCP to request the referral  Patient will like to know if Dr Noris Pineda wants to do any further testing since she is experiencing discomfort  Please advice

## 2018-04-02 ENCOUNTER — CONVERSION ENCOUNTER (OUTPATIENT)
Dept: MAMMOGRAPHY | Facility: CLINIC | Age: 63
End: 2018-04-02

## 2018-04-17 ENCOUNTER — TELEPHONE (OUTPATIENT)
Dept: FAMILY MEDICINE CLINIC | Facility: CLINIC | Age: 63
End: 2018-04-17

## 2018-04-17 NOTE — TELEPHONE ENCOUNTER
Pt requesting her mammogram results and u/[place] she [place] [facility]  She spoke to them and they faxed the results to us twice but we still have not received them  I called Felisa Epley [facility] and had to leave a voicemail to call us back to see if they would be able to email us the results  Their # is [phone]  I called pt  Back to make her aware that I did have to leave a voicemail

## 2018-04-17 NOTE — TELEPHONE ENCOUNTER
If having breast pain would rec surgery eval-really need reports to know whether she should see breast surgeon or general surgeon

## 2018-04-17 NOTE — TELEPHONE ENCOUNTER
I spoke to [facility] and they are unable to email us the results  She did give us the results over the phone that both the u/s and mammogram were normal and to return to getting routine mammograms in one year  Made pt  Aware  Forwarding it to you as a F Y  I until we get the official reports in

## 2018-04-18 NOTE — TELEPHONE ENCOUNTER
Patient stated she is not in pain and she is much better  She just wanted to know what the problem was with her breast  Patient will try to get the report from them

## 2018-06-28 ENCOUNTER — ANNUAL EXAM (OUTPATIENT)
Dept: OBGYN CLINIC | Facility: MEDICAL CENTER | Age: 63
End: 2018-06-28
Payer: COMMERCIAL

## 2018-06-28 VITALS
BODY MASS INDEX: 25.12 KG/M2 | WEIGHT: 150.8 LBS | DIASTOLIC BLOOD PRESSURE: 84 MMHG | SYSTOLIC BLOOD PRESSURE: 122 MMHG | HEIGHT: 65 IN

## 2018-06-28 DIAGNOSIS — Z12.31 ENCOUNTER FOR SCREENING MAMMOGRAM FOR MALIGNANT NEOPLASM OF BREAST: Primary | ICD-10-CM

## 2018-06-28 PROBLEM — M54.41 ACUTE RIGHT-SIDED LOW BACK PAIN WITH RIGHT-SIDED SCIATICA: Status: ACTIVE | Noted: 2017-07-05

## 2018-06-28 PROBLEM — M48.061 SPINAL STENOSIS OF LUMBAR REGION: Status: ACTIVE | Noted: 2017-08-01

## 2018-06-28 PROBLEM — I10 HYPERTENSION: Status: ACTIVE | Noted: 2017-05-10

## 2018-06-28 PROCEDURE — S0610 ANNUAL GYNECOLOGICAL EXAMINA: HCPCS | Performed by: NURSE PRACTITIONER

## 2018-06-28 NOTE — PROGRESS NOTES
ASSESSMENT & PLAN: Belen Ryan is a 66 y o  [id number] with normal gynecologic exam     1   Routine well woman exam done today  2  Pap and HPV:  The patient's last pap and hpv was in   It was normal     Pap and cotesting was not done today  Current ASCCP Guidelines reviewed  3   Mammogram ordered  4  Colonoscopy up to date  11  The following were reviewed in today's visit: breast self exam, mammography screening ordered, adequate intake of calcium and vitamin D, exercise and healthy diet  6  Rec  Pt try the flovent inhaler that she was given for likely reactive airway cough she is experiencing , and keep appt with pulmonary  CC:  Annual Gynecologic Examination    HPI: Belen Ryan is a 66 y o  [id number] who presents for annual gynecologic examination  She has the following concerns: chronic cough for 6 months after a cold  Has appt with pulmonary in august      Health Maintenance:    She wears her seatbelt routinely  She does not perform regular monthly self breast exams  She feels safe at home  Pap: neg in   Last mammogram:  Last colonoscopy:    History reviewed  No pertinent past medical history  History reviewed  No pertinent surgical history  Past OB/Gyn History:  OB History      Para Term  AB Living    3       1 2    SAB TAB Ectopic Multiple Live Births                     Pt does not have menstrual issues  History of sexually transmitted infection: No   History of abnormal pap smears: No      Patient is currently sexually active  heterosexual   The current method of family planning is post menopausal status  History reviewed  No pertinent family history  Social History:  Social History     Social History    Marital status: /Civil Union     Spouse name: N/A    Number of children: N/A    Years of education: N/A     Occupational History    Not on file       Social History Main Topics    Smoking status: Current Some Day Smoker    Smokeless tobacco: Never Used    Alcohol use No    Drug use: No    Sexual activity: Yes     Partners: Male     Birth control/ protection: None     Other Topics Concern    Not on file     Social History Narrative    No narrative on file     Presently lives with spouse of 39 years  Patient is currently employed in the [employer]  Allergies   Allergen Reactions    Diphenhydramine     Gabapentin Eye Swelling and Rash       Current Outpatient Prescriptions:     lisinopril (ZESTRIL) 20 mg tablet, Take 1 tablet (20 mg total) by mouth daily, Disp: 90 tablet, Rfl: 3      Review of Systems  Constitutional :no fever, feels well, no tiredness, no recent weight gain or loss  ENT: no ear ache, no loss of hearing, no nosebleeds or nasal discharge, no sore throat or hoarseness  Cardiovascular: no complaints of slow or fast heart beat, no chest pain, no palpitations, no leg claudication or lower extremity edema  Respiratory: no complaints of shortness of shortness of breath, no ARDON  Breasts:no complaints of breast pain, breast lump, or nipple discharge  Gastrointestinal: no complaints of abdominal pain, constipation, nausea, vomiting, or diarrhea or bloody stools  Genitourinary : no complaints of dysuria, incontinence, pelvic pain, no dysmenorrhea, vaginal discharge or abnormal vaginal bleeding and as noted in HPI  Musculoskeletal: no complaints of arthralgia, no myalgia, no joint swelling or stiffness, no limb pain or swelling    Integumentary: no complaints of skin rash or lesion, itching or dry skin  Neurological: no complaints of headache, no confusion, no numbness or tingling, no dizziness or fainting    Objective      /84   Ht 5' 5 1" (1 654 m)   Wt 68 4 kg (150 lb 12 8 oz)   BMI 25 02 kg/m²     General:   appears stated age, cooperative, alert normal mood and affect   Neck: normal, supple,trachea midline, no masses   Heart: regular rate and rhythm, S1, S2 normal, no murmur, click, rub or gallop   Lungs: clear to auscultation bilaterally   Breasts: normal appearance, no masses or tenderness   Abdomen: soft, non-tender, without masses or organomegaly   Vulva: normal female genitalia   Vagina: normal vagina   Urethra: normal   Cervix: Normal, no discharge  Uterus: normal size, contour, position, consistency, mobility, non-tender   Adnexa: normal adnexa   Lymphatic palpation of lymph nodes in neck, axilla, groin and/or other locations: no lymphadenopathy or masses noted   Skin normal skin turgor and no rashes     Psychiatric orientation to person, place, and time: normal  mood and affect: normal

## 2018-08-28 ENCOUNTER — OFFICE VISIT (OUTPATIENT)
Dept: PULMONOLOGY | Facility: CLINIC | Age: 63
End: 2018-08-28
Payer: COMMERCIAL

## 2018-08-28 VITALS
WEIGHT: 151 LBS | SYSTOLIC BLOOD PRESSURE: 140 MMHG | RESPIRATION RATE: 18 BRPM | HEART RATE: 95 BPM | BODY MASS INDEX: 24.27 KG/M2 | OXYGEN SATURATION: 98 % | DIASTOLIC BLOOD PRESSURE: 98 MMHG | HEIGHT: 66 IN | TEMPERATURE: 99.1 F

## 2018-08-28 DIAGNOSIS — R05.9 COUGH: Primary | ICD-10-CM

## 2018-08-28 DIAGNOSIS — J30.9 ALLERGIC RHINITIS: ICD-10-CM

## 2018-08-28 PROCEDURE — 99244 OFF/OP CNSLTJ NEW/EST MOD 40: CPT | Performed by: INTERNAL MEDICINE

## 2018-08-28 PROCEDURE — 94010 BREATHING CAPACITY TEST: CPT | Performed by: INTERNAL MEDICINE

## 2018-08-28 RX ORDER — FEXOFENADINE HCL 180 MG/1
180 TABLET ORAL DAILY
Qty: 30 TABLET | Refills: 5 | Status: SHIPPED | OUTPATIENT
Start: 2018-08-28 | End: 2019-01-16 | Stop reason: ALTCHOICE

## 2018-08-28 RX ORDER — FLUTICASONE PROPIONATE 50 MCG
1 SPRAY, SUSPENSION (ML) NASAL DAILY
Qty: 1 BOTTLE | Refills: 5 | Status: SHIPPED | OUTPATIENT
Start: 2018-08-28 | End: 2019-01-16 | Stop reason: CLARIF

## 2018-08-28 NOTE — PROGRESS NOTES
Pulmonary Consultation   Oksana Correa 66 y o  female MRN: [id number]    Encounter: [id number]      Reason for consultation:   Cough    Requesting physician: Timothy Still      Impressions:   · Cough  · Allergic rhinitis  · Anxiety  Recommendations:  · Spirometry  · Fluticasone nasal spray 2 sprays to each nostril once a day  · Fexofenadine 180 mg once a day  · Follow-up in 2 months  Discussion:  The patient's cough is most likely due to her allergic rhinitis  Other possible causes would be exposure to certain allergens in the environment specially at work and in her bedroom  Her spirometry today is near normal  She had difficulty doing the test   I have reassured the patient that there is no serious underlying lung disease as she was very anxious about her symptoms  I have started her on fluticasone nasal spray 2 sprays to each nostril once a day as well as fexofenadine 180 mg once a day for her allergic rhinitis and postnasal dripping  I have told the patient to keep a diary on possible triggers for her cough  If she still has persistent cough I will start her a treatment of her gastroesophageal reflux disease during the next visit  I will see her in 2 months  History of Present Illness   HPI:  Oksana Correa is a 66 y o  female who is here for evaluation of cough  The patient stated that since last thanks given she was having intermittent cough which is mainly dry  Initially she stated there was no trigger however upon questioning her she stated that she coughs more when she is in her bedroom laying down or when she is at work  She has days when she does not cough at all  When she is away from home on vacation she does not cough  She denies any wheezing or chest tightness  She was treated with prednisone in the past as well as inhalers  She denies any hemoptysis  No chest pain  No shortness of Breath  She is very anxious about her symptoms      Review of systems:  12 point review of systems was completed and was otherwise negative except as listed in HPI  Historical Information   Past Medical History:   Diagnosis Date    Hypertension      Past Surgical History:   Procedure Laterality Date     SECTION       History reviewed  No pertinent family history  Family History:  Noncontributory  Social History:  She works as a  at [employer]  She smokes socially  Her  is a heavy smoker smokes a pack a day  She has a dog  No birds  Meds/Allergies   No current facility-administered medications for this visit  (Not in a hospital admission)  Allergies   Allergen Reactions    Diphenhydramine     Gabapentin Eye Swelling and Rash       Vitals: Blood pressure 140/98, pulse 95, temperature 99 1 °F (37 3 °C), temperature source Tympanic, resp  rate 18, height 5' 6" (1 676 m), weight 68 5 kg (151 lb), SpO2 98 %  ,      Physical exam:        Head/eyes:    Normocephalic, without obvious abnormality, atraumatic,         PERRL, extraocular muscles intact, no scleral icterus    Nose:   Nares normal, septum midline, mucosa normal, no drainage    or sinus tenderness   Throat:   Moist mucous membranes, no thrush   Neck:   Supple, trachea midline, no adenopathy; no carotid    bruit or JVD   Lungs:     Clear breath sounds  No wheezing or rhonchi  Heart:    Regular rate and rhythm, S1 and S2 normal, no murmur, rub   or gallop   Abdomen:     Soft, non-tender, bowel sounds active all four quadrants,     no masses, no organomegaly   Extremities:   Extremities normal, atraumatic, no cyanosis or edema   Skin:   Warm, dry, turgor normal, no rashes or lesions   Neurologic:   CNII-XII intact, normal strength, non-focal           Imaging and other studies: I have personally reviewed pertinent films in PACS and chest x-ray from last December showed no active pulmonary disease  Spirometry done today in the office    The patient had difficulty doing the test   It has near normal vital capacity and normal airflow          Adriane Hurst MD

## 2018-09-10 ENCOUNTER — TELEPHONE (OUTPATIENT)
Dept: PULMONOLOGY | Facility: CLINIC | Age: 63
End: 2018-09-10

## 2018-09-10 NOTE — TELEPHONE ENCOUNTER
Patient called stating coughing have not subsided after taking Flonase 50mcg and Allegra 180mg  Patient would like a call back with advise on what to do next  Patient can be reached  at [phone]   Thank you

## 2018-09-11 ENCOUNTER — TELEPHONE (OUTPATIENT)
Dept: OTHER | Facility: HOSPITAL | Age: 63
End: 2018-09-11

## 2018-09-11 NOTE — TELEPHONE ENCOUNTER
Carlene Medina was called back  No answer message left for her to follow with her PCP    She was instructed to call the office if she has further questions

## 2018-09-11 NOTE — TELEPHONE ENCOUNTER
Spoke with Akiko Boss  She has been feeling like this for the past week  She is coughing non stop  She had to take off work yesterday  It is worse at night  When she inhales her chest hurts  It is worse when she lays down  She is coughing up clear mucous  She feels like her head is congested and her ears are blocked  She says she has a little wheeze and some chest tightness  Denies SOB, fever, chills or night sweats  She is achy  She did not try any OTC cough medicine  She would like a call back today

## 2018-09-11 NOTE — TELEPHONE ENCOUNTER
Patient calling saying she never received a call back yesterday regarding her chronic cough  Called patient to get more information  No answer  LM to call back

## 2018-10-05 ENCOUNTER — OFFICE VISIT (OUTPATIENT)
Dept: FAMILY MEDICINE CLINIC | Facility: CLINIC | Age: 63
End: 2018-10-05
Payer: COMMERCIAL

## 2018-10-05 VITALS
DIASTOLIC BLOOD PRESSURE: 80 MMHG | HEART RATE: 82 BPM | TEMPERATURE: 98 F | HEIGHT: 66 IN | SYSTOLIC BLOOD PRESSURE: 120 MMHG | WEIGHT: 151.4 LBS | RESPIRATION RATE: 18 BRPM | BODY MASS INDEX: 24.33 KG/M2

## 2018-10-05 DIAGNOSIS — I10 ESSENTIAL HYPERTENSION: ICD-10-CM

## 2018-10-05 DIAGNOSIS — R05.9 COUGH: ICD-10-CM

## 2018-10-05 DIAGNOSIS — R10.11 RIGHT UPPER QUADRANT ABDOMINAL PAIN: Primary | ICD-10-CM

## 2018-10-05 PROCEDURE — 99214 OFFICE O/P EST MOD 30 MIN: CPT | Performed by: FAMILY MEDICINE

## 2018-10-05 RX ORDER — IRBESARTAN 75 MG/1
75 TABLET ORAL
Qty: 30 TABLET | Refills: 5 | Status: SHIPPED | OUTPATIENT
Start: 2018-10-05 | End: 2019-01-16 | Stop reason: CLARIF

## 2018-10-05 NOTE — PROGRESS NOTES
Assessment/Plan:    No problem-specific Assessment & Plan notes found for this encounter  There are no diagnoses linked to this encounter  Subjective:      Patient ID: Kristen Emerson is a 66 y o  female  Cough   This is a chronic problem  The current episode started more than 1 month ago  The problem has been unchanged  The problem occurs constantly  The cough is non-productive  Pertinent negatives include no chest pain, ear pain, headaches, sore throat or shortness of breath  Nothing aggravates the symptoms  Risk factors for lung disease include smoking/tobacco exposure  She has tried OTC cough suppressant for the symptoms  The treatment provided mild relief  The following portions of the patient's history were reviewed and updated as appropriate: allergies, current medications, past family history, past medical history, past social history, past surgical history and problem list       Review of Systems   Constitutional: Negative for activity change, appetite change and unexpected weight change  HENT: Negative for ear pain and sore throat  Respiratory: Positive for cough  Negative for shortness of breath  Cardiovascular: Negative for chest pain  Gastrointestinal: Positive for abdominal pain  Neurological: Negative for headaches  Objective:      /80 (BP Location: Left arm, Patient Position: Sitting, Cuff Size: Adult)   Pulse 82   Temp 98 °F (36 7 °C) (Temporal)   Resp 18   Ht 5' 6" (1 676 m)   Wt 68 7 kg (151 lb 6 4 oz)   BMI 24 44 kg/m²          Physical Exam   Constitutional: She appears well-developed and well-nourished  Neck: No thyromegaly present  Cardiovascular: Normal rate, regular rhythm and normal heart sounds  Pulmonary/Chest: Breath sounds normal    Abdominal: Soft  Bowel sounds are normal  There is tenderness in the right upper quadrant  There is no rigidity, no guarding and negative Murguia's sign  Musculoskeletal: She exhibits no edema  Lymphadenopathy:     She has no cervical adenopathy  Vitals reviewed

## 2018-10-11 ENCOUNTER — TELEPHONE (OUTPATIENT)
Dept: FAMILY MEDICINE CLINIC | Facility: CLINIC | Age: 63
End: 2018-10-11

## 2018-10-11 ENCOUNTER — HOSPITAL ENCOUNTER (OUTPATIENT)
Dept: ULTRASOUND IMAGING | Facility: HOSPITAL | Age: 63
Discharge: HOME | End: 2018-10-11
Payer: COMMERCIAL

## 2018-10-11 DIAGNOSIS — R10.11 RIGHT UPPER QUADRANT ABDOMINAL PAIN: ICD-10-CM

## 2018-10-11 PROCEDURE — 76700 US EXAM ABDOM COMPLETE: CPT

## 2018-10-12 ENCOUNTER — TELEPHONE (OUTPATIENT)
Dept: FAMILY MEDICINE CLINIC | Facility: CLINIC | Age: 63
End: 2018-10-12

## 2018-10-12 DIAGNOSIS — R10.11 ABDOMINAL PAIN, RUQ: Primary | ICD-10-CM

## 2018-10-12 NOTE — TELEPHONE ENCOUNTER
----- Message from Mirella Lundy MD sent at 10/12/2018 12:02 PM EDT -----  Tiny polyp of gallbladder not reason for sx, if sx persisting rec HIDA scan

## 2018-10-15 NOTE — TELEPHONE ENCOUNTER
Pt will return our call to get central scheduling number she was somewhere where she couldn't speak or write the phone number

## 2018-10-25 ENCOUNTER — OFFICE VISIT (OUTPATIENT)
Dept: PULMONOLOGY | Facility: CLINIC | Age: 63
End: 2018-10-25
Payer: COMMERCIAL

## 2018-10-25 VITALS
WEIGHT: 150.8 LBS | TEMPERATURE: 97.9 F | DIASTOLIC BLOOD PRESSURE: 72 MMHG | HEIGHT: 63 IN | BODY MASS INDEX: 26.72 KG/M2 | RESPIRATION RATE: 18 BRPM | SYSTOLIC BLOOD PRESSURE: 120 MMHG | OXYGEN SATURATION: 96 % | HEART RATE: 90 BPM

## 2018-10-25 DIAGNOSIS — J30.9 ALLERGIC RHINITIS: ICD-10-CM

## 2018-10-25 DIAGNOSIS — R05.9 COUGH: Primary | ICD-10-CM

## 2018-10-25 PROCEDURE — 99214 OFFICE O/P EST MOD 30 MIN: CPT | Performed by: INTERNAL MEDICINE

## 2018-10-25 NOTE — PROGRESS NOTES
Office Progress Note - Pulmonary    Ricardo Heredia 66 y o  female MRN: [id number]    Encounter: [id number]      Assessment:  · Cough  · Allergic rhinitis  Plan:   · Saline and nasal lavage 1 to 2 times a day as needed  · Fluticasone nasal spray 2 sprays to each nostril once a day  · Fexofenadine 180 mg once a day  · Follow-up in 2 months  Discussion:   The patient's cough has improved but has not resolved  The patient still has postnasal dripping which is causing her cough  I have started the patient on saline nasal and sinus lavage  I have showed her the product online and recommended to watch Youtube video on how to use it  I have emphasized that she should use the fluticasone nasal spray as 2 sprays to each nostril once a day  I have maintained her on the fexofenadine 180 mg once a day  If her cough does not resolve by the next visit I will start her on a trial of Breo  I will see her in 2 months in a follow-up visit  Subjective: The patient is here for a follow-up visit  She stated her cough has improved by at least 50%  However she still has intermittent cough  Also she has the urge to clear her throat frequently  She is using the fluticasone only 1 spray to each nostril once a day  She is also on the fexofenadine 180 mg once a day  About 10 days ago she started having worsening cough which she thought is an upper respiratory infection caught it from work  She denies any fever or chills  No significant sputum production  She denies any chest pain or palpitations  No wheezing or rhonchi  At night she has occasional cough  Review of systems:  A 12 point system review is done and aside from what is stated above the rest of the review of systems is negative  Family history and social history are reviewed  Medications list is reviewed  Vitals: Blood pressure 120/72, pulse 90, temperature 97 9 °F (36 6 °C), temperature source Tympanic, resp   rate 18, height 5' 3" (1 6 m), weight 68 4 kg (150 lb 12 8 oz), SpO2 96 %  ,     Physical Exam  Gen: Awake, alert, oriented x 3, no acute distress  HEENT: Mucous membranes moist, no oral lesions, no thrush  NECK: No accessory muscle use, JVP not elevated  Cardiac: Regular, single S1, single S2, no murmurs, no rubs, no gallops  Lungs:   Clear breath sounds  No wheezing or rhonchi  Abdomen: normoactive bowel sounds, soft nontender, nondistended, no rebound or rigidity, no guarding  Extremities: no cyanosis, no clubbing, no edema  Neuro:  Grossly nonfocal   Skin:  No rash

## 2018-11-07 ENCOUNTER — TELEPHONE (OUTPATIENT)
Dept: FAMILY MEDICINE CLINIC | Facility: CLINIC | Age: 63
End: 2018-11-07

## 2018-11-07 NOTE — TELEPHONE ENCOUNTER
Patient called advising she still experiencing the abdominal pain  Patient is under the impression sx is not related to gallbladder but if you recommend the HIDA test she will go for it  Patient wanted to know if an MRI can be done prior to the HIDA  Please advise

## 2018-11-08 DIAGNOSIS — R10.11 RUQ PAIN: Primary | ICD-10-CM

## 2018-11-12 ENCOUNTER — HOSPITAL ENCOUNTER (OUTPATIENT)
Dept: RADIOLOGY | Facility: HOSPITAL | Age: 63
Discharge: HOME | End: 2018-11-12
Payer: COMMERCIAL

## 2018-11-12 ENCOUNTER — TELEPHONE (OUTPATIENT)
Dept: FAMILY MEDICINE CLINIC | Facility: CLINIC | Age: 63
End: 2018-11-12

## 2018-11-12 DIAGNOSIS — R10.11 RUQ PAIN: ICD-10-CM

## 2018-11-12 PROCEDURE — 78227 HEPATOBIL SYST IMAGE W/DRUG: CPT

## 2018-11-12 PROCEDURE — A9537 TC99M MEBROFENIN: HCPCS

## 2018-11-12 RX ADMIN — SINCALIDE 1.4 MCG: 5 INJECTION, POWDER, LYOPHILIZED, FOR SOLUTION INTRAVENOUS at 11:24

## 2018-12-14 ENCOUNTER — TELEPHONE (OUTPATIENT)
Dept: FAMILY MEDICINE CLINIC | Facility: CLINIC | Age: 63
End: 2018-12-14

## 2018-12-14 NOTE — TELEPHONE ENCOUNTER
[facility] and they only prescribed amoxil  Patient will like to know if Dr Maria Del Carmen Walters can send a different antibiotic because the amoxil is not helping her since there is no way she can come in because there are no openings  Please advice

## 2018-12-14 NOTE — TELEPHONE ENCOUNTER
Had her ears flushed and was told she had an ear infection can barely hear no pain will call [facility] to get records from this visit

## 2018-12-17 DIAGNOSIS — H65.90 NON-SUPPURATIVE OTITIS MEDIA, UNSPECIFIED LATERALITY: Primary | ICD-10-CM

## 2018-12-17 RX ORDER — CEFUROXIME AXETIL 500 MG/1
500 TABLET ORAL EVERY 12 HOURS SCHEDULED
Qty: 20 TABLET | Refills: 0 | Status: SHIPPED | OUTPATIENT
Start: 2018-12-17 | End: 2018-12-19 | Stop reason: SDUPTHER

## 2018-12-19 ENCOUNTER — OFFICE VISIT (OUTPATIENT)
Dept: PULMONOLOGY | Facility: CLINIC | Age: 63
End: 2018-12-19
Payer: COMMERCIAL

## 2018-12-19 VITALS
HEART RATE: 80 BPM | RESPIRATION RATE: 18 BRPM | DIASTOLIC BLOOD PRESSURE: 90 MMHG | HEIGHT: 63 IN | SYSTOLIC BLOOD PRESSURE: 140 MMHG | BODY MASS INDEX: 26.22 KG/M2 | WEIGHT: 148 LBS | OXYGEN SATURATION: 97 % | TEMPERATURE: 98.1 F

## 2018-12-19 DIAGNOSIS — J06.9 URI (UPPER RESPIRATORY INFECTION): ICD-10-CM

## 2018-12-19 DIAGNOSIS — J30.9 ALLERGIC RHINITIS: ICD-10-CM

## 2018-12-19 DIAGNOSIS — R05.9 COUGH: Primary | ICD-10-CM

## 2018-12-19 DIAGNOSIS — H66.90 OTITIS: ICD-10-CM

## 2018-12-19 PROCEDURE — 99214 OFFICE O/P EST MOD 30 MIN: CPT | Performed by: INTERNAL MEDICINE

## 2018-12-19 RX ORDER — METHYLPREDNISOLONE 4 MG/1
TABLET ORAL
Refills: 0 | COMMUNITY
Start: 2018-12-17 | End: 2019-01-16 | Stop reason: ALTCHOICE

## 2018-12-19 RX ORDER — BENZONATATE 100 MG/1
100 CAPSULE ORAL 3 TIMES DAILY PRN
Refills: 0 | COMMUNITY
Start: 2018-12-10 | End: 2019-01-16 | Stop reason: ALTCHOICE

## 2018-12-19 RX ORDER — AMOXICILLIN 500 MG/1
CAPSULE ORAL
COMMUNITY
Start: 2018-12-10 | End: 2019-01-16 | Stop reason: ALTCHOICE

## 2018-12-19 NOTE — PROGRESS NOTES
Office Progress Note - Pulmonary    Farhat Moran 66 y o  female MRN: [id number]    Encounter: [id number]      Assessment:  · Cough  · Upper respiratory infection  · Otitis  · Allergic rhinitis  · Hypertension  Plan:   · Saline nasal and sinus lavage  · Fluticasone nasal spray 2 sprays to each nostril once a day  · Agree with the methylprednisone lung course with a taper  · Fexofenadine 180 mg p o  Daily  · Check blood pressure at home and keep a diary of the readings  · Follow-up in 2 months  Discussion:   The patient's symptoms are consistent with upper respiratory infection causing otitis  It seems her symptoms are improving  I agree with the methylprednisolone and tapering doses  Her cough is due to the upper respiratory infection  I have recommended to use Robitussin as needed specially at night  I have recommended saline nasal/sinus lavage as needed  She will continue to use the fluticasone nasal spray 2 sprays to each nostril once a day and the fexofenadine 180 mg once a day  The patient's blood pressure was mildly elevated  I have recommended to the patient to check her blood pressure at home and keep a diary  If it is elevated to call her family doctor to adjust her blood pressure medications  I will see her in 2 months in a follow-up visit  Subjective: The patient is here for follow-up visit  Until about 2 weeks ago she had no significant symptoms  Two weeks ago she started feeling congested in her head and thought she was developing a cold  The 2nd day she stated that her hearing was markedly diminished  [facility] and was [facility] antibiotics  Her symptoms did not improve  She went [facility] to the [facility] and she was referred to [facility]  She was seen by the ear nose and throat practice and was prescribed methylprednisolone    Today the patient stated that for the 1st time in 2 weeks she started to feel better  She denies any fever  However she has a cough  Cough is congested  Her voice sounds congested  She did not use the saline nasal/sinus rinse after the last visit  However she was using the fluticasone nasal spray 2 sprays to each nostril once a day  She has stopped using the fexofenadine  Review of systems:  A 12 point system review is done and aside from what is stated above the rest of the review of systems is negative  Family history and social history are reviewed  Medications list is reviewed  Vitals: Blood pressure 140/90, pulse 80, temperature 98 1 °F (36 7 °C), temperature source Tympanic, resp  rate 18, height 5' 3" (1 6 m), weight 67 1 kg (148 lb), SpO2 97 %  ,     Physical Exam  Gen: Awake, alert, oriented x 3, no acute distress  HEENT: Mucous membranes moist, no oral lesions, no thrush  The tympanic membranes were mildly congested  NECK: No accessory muscle use, JVP not elevated  Cardiac: Regular, single S1, single S2, no murmurs, no rubs, no gallops  Lungs:   Clear breath sounds  No wheezing or rhonchi  Abdomen: normoactive bowel sounds, soft nontender, nondistended, no rebound or rigidity, no guarding  Extremities: no cyanosis, no clubbing, no edema  Neuro:  Grossly nonfocal   Skin:  No rash

## 2019-01-08 ENCOUNTER — TELEPHONE (OUTPATIENT)
Dept: FAMILY MEDICINE CLINIC | Facility: CLINIC | Age: 64
End: 2019-01-08

## 2019-01-08 DIAGNOSIS — R10.9 ABDOMINAL PAIN, UNSPECIFIED ABDOMINAL LOCATION: Primary | ICD-10-CM

## 2019-01-08 NOTE — TELEPHONE ENCOUNTER
Patient called in stating her stomach is swollen and she is having pain on her Right side under her hip and groin area  Per patient Dr David Bobby did mentioned before about her going to see Gastro  Per patient she will like to know if Dr David Bobby can generate the referral since she is still experiencing a lot of pain  Please advice

## 2019-01-09 ENCOUNTER — TELEPHONE (OUTPATIENT)
Dept: GASTROENTEROLOGY | Facility: MEDICAL CENTER | Age: 64
End: 2019-01-09

## 2019-01-16 ENCOUNTER — OFFICE VISIT (OUTPATIENT)
Dept: FAMILY MEDICINE CLINIC | Facility: CLINIC | Age: 64
End: 2019-01-16
Payer: COMMERCIAL

## 2019-01-16 VITALS
SYSTOLIC BLOOD PRESSURE: 144 MMHG | TEMPERATURE: 98.1 F | BODY MASS INDEX: 26.93 KG/M2 | RESPIRATION RATE: 16 BRPM | HEIGHT: 63 IN | WEIGHT: 152 LBS | DIASTOLIC BLOOD PRESSURE: 96 MMHG | OXYGEN SATURATION: 97 % | HEART RATE: 94 BPM

## 2019-01-16 DIAGNOSIS — I10 ESSENTIAL HYPERTENSION: ICD-10-CM

## 2019-01-16 DIAGNOSIS — R05.9 COUGH: Primary | ICD-10-CM

## 2019-01-16 PROCEDURE — 99213 OFFICE O/P EST LOW 20 MIN: CPT | Performed by: FAMILY MEDICINE

## 2019-01-16 RX ORDER — AZELASTINE 1 MG/ML
1 SPRAY, METERED NASAL 2 TIMES DAILY
Qty: 1 BOTTLE | Refills: 5 | Status: SHIPPED | OUTPATIENT
Start: 2019-01-16 | End: 2019-01-17

## 2019-01-16 RX ORDER — IRBESARTAN 150 MG/1
150 TABLET ORAL
Qty: 30 TABLET | Refills: 5 | Status: SHIPPED | OUTPATIENT
Start: 2019-01-16 | End: 2019-03-04 | Stop reason: ALTCHOICE

## 2019-01-16 NOTE — PROGRESS NOTES
Assessment/Plan:    No problem-specific Assessment & Plan notes found for this encounter  Diagnoses and all orders for this visit:    Cough  -     azelastine (ASTELIN) 0 1 % nasal spray; 1 spray into each nostril 2 (two) times a day Use in each nostril as directed    Essential hypertension  -     irbesartan (AVAPRO) 150 mg tablet; Take 1 tablet (150 mg total) by mouth daily at bedtime          Subjective:      Patient ID: Alexandro Gonzales is a 66 y o  female  Hypertension   This is a chronic problem  The current episode started more than 1 year ago  The problem has been waxing and waning since onset  The problem is controlled  Associated symptoms include anxiety and headaches  Pertinent negatives include no chest pain, palpitations, peripheral edema or shortness of breath  (Having dizzy spells) Risk factors for coronary artery disease include smoking/tobacco exposure  Past treatments include angiotensin blockers  The current treatment provides mild improvement  There are no compliance problems  The following portions of the patient's history were reviewed and updated as appropriate: allergies, current medications, past family history, past medical history, past social history, past surgical history and problem list     Review of Systems   Constitutional: Negative for activity change, appetite change and unexpected weight change  Respiratory: Negative for shortness of breath  Cardiovascular: Negative for chest pain and palpitations  Gastrointestinal: Positive for abdominal pain  Neurological: Positive for dizziness and headaches  Objective:      /96 (BP Location: Left arm, Patient Position: Sitting, Cuff Size: Standard)   Pulse 94   Temp 98 1 °F (36 7 °C) (Temporal)   Resp 16   Ht 5' 3" (1 6 m)   Wt 68 9 kg (152 lb)   SpO2 97%   BMI 26 93 kg/m²          Physical Exam   Constitutional: She appears well-developed and well-nourished  Neck: No thyromegaly present  Cardiovascular: Normal rate, regular rhythm and normal heart sounds  Pulmonary/Chest: Breath sounds normal    Musculoskeletal: She exhibits no edema  Lymphadenopathy:     She has no cervical adenopathy  Vitals reviewed

## 2019-01-17 ENCOUNTER — OFFICE VISIT (OUTPATIENT)
Dept: GASTROENTEROLOGY | Facility: MEDICAL CENTER | Age: 64
End: 2019-01-17
Payer: COMMERCIAL

## 2019-01-17 ENCOUNTER — APPOINTMENT (OUTPATIENT)
Dept: LAB | Facility: MEDICAL CENTER | Age: 64
End: 2019-01-17
Attending: INTERNAL MEDICINE
Payer: COMMERCIAL

## 2019-01-17 ENCOUNTER — DOCUMENTATION (OUTPATIENT)
Dept: GASTROENTEROLOGY | Facility: MEDICAL CENTER | Age: 64
End: 2019-01-17

## 2019-01-17 VITALS
DIASTOLIC BLOOD PRESSURE: 82 MMHG | TEMPERATURE: 96.8 F | SYSTOLIC BLOOD PRESSURE: 126 MMHG | BODY MASS INDEX: 26.93 KG/M2 | HEIGHT: 63 IN | HEART RATE: 65 BPM | WEIGHT: 152 LBS

## 2019-01-17 DIAGNOSIS — R10.9 ABDOMINAL PAIN, UNSPECIFIED ABDOMINAL LOCATION: ICD-10-CM

## 2019-01-17 LAB
ALBUMIN SERPL BCP-MCNC: 4.4 G/DL (ref 3.5–5)
ALP SERPL-CCNC: 62 U/L (ref 46–116)
ALT SERPL W P-5'-P-CCNC: 22 U/L (ref 12–78)
ANION GAP SERPL CALCULATED.3IONS-SCNC: 7 MMOL/L (ref 4–13)
AST SERPL W P-5'-P-CCNC: 12 U/L (ref 5–45)
BASOPHILS # BLD AUTO: 0.02 THOUSANDS/ΜL (ref 0–0.1)
BASOPHILS NFR BLD AUTO: 0 % (ref 0–1)
BILIRUB DIRECT SERPL-MCNC: 0.17 MG/DL (ref 0–0.2)
BILIRUB SERPL-MCNC: 0.76 MG/DL (ref 0.2–1)
BUN SERPL-MCNC: 17 MG/DL (ref 5–25)
CALCIUM SERPL-MCNC: 9.7 MG/DL (ref 8.3–10.1)
CHLORIDE SERPL-SCNC: 104 MMOL/L (ref 100–108)
CO2 SERPL-SCNC: 27 MMOL/L (ref 21–32)
CREAT SERPL-MCNC: 0.61 MG/DL (ref 0.6–1.3)
EOSINOPHIL # BLD AUTO: 0.03 THOUSAND/ΜL (ref 0–0.61)
EOSINOPHIL NFR BLD AUTO: 1 % (ref 0–6)
ERYTHROCYTE [DISTWIDTH] IN BLOOD BY AUTOMATED COUNT: 12.2 % (ref 11.6–15.1)
GFR SERPL CREATININE-BSD FRML MDRD: 98 ML/MIN/1.73SQ M
GLUCOSE P FAST SERPL-MCNC: 102 MG/DL (ref 65–99)
HCT VFR BLD AUTO: 45.2 % (ref 34.8–46.1)
HGB BLD-MCNC: 14.8 G/DL (ref 11.5–15.4)
IMM GRANULOCYTES # BLD AUTO: 0.02 THOUSAND/UL (ref 0–0.2)
IMM GRANULOCYTES NFR BLD AUTO: 0 % (ref 0–2)
LYMPHOCYTES # BLD AUTO: 1.29 THOUSANDS/ΜL (ref 0.6–4.47)
LYMPHOCYTES NFR BLD AUTO: 24 % (ref 14–44)
MCH RBC QN AUTO: 31.6 PG (ref 26.8–34.3)
MCHC RBC AUTO-ENTMCNC: 32.7 G/DL (ref 31.4–37.4)
MCV RBC AUTO: 97 FL (ref 82–98)
MONOCYTES # BLD AUTO: 0.35 THOUSAND/ΜL (ref 0.17–1.22)
MONOCYTES NFR BLD AUTO: 7 % (ref 4–12)
NEUTROPHILS # BLD AUTO: 3.7 THOUSANDS/ΜL (ref 1.85–7.62)
NEUTS SEG NFR BLD AUTO: 68 % (ref 43–75)
NRBC BLD AUTO-RTO: 0 /100 WBCS
PLATELET # BLD AUTO: 210 THOUSANDS/UL (ref 149–390)
PMV BLD AUTO: 9.4 FL (ref 8.9–12.7)
POTASSIUM SERPL-SCNC: 3.9 MMOL/L (ref 3.5–5.3)
PROT SERPL-MCNC: 7.9 G/DL (ref 6.4–8.2)
RBC # BLD AUTO: 4.68 MILLION/UL (ref 3.81–5.12)
SODIUM SERPL-SCNC: 138 MMOL/L (ref 136–145)
WBC # BLD AUTO: 5.41 THOUSAND/UL (ref 4.31–10.16)

## 2019-01-17 PROCEDURE — 80076 HEPATIC FUNCTION PANEL: CPT

## 2019-01-17 PROCEDURE — 99244 OFF/OP CNSLTJ NEW/EST MOD 40: CPT | Performed by: INTERNAL MEDICINE

## 2019-01-17 PROCEDURE — 80048 BASIC METABOLIC PNL TOTAL CA: CPT

## 2019-01-17 PROCEDURE — 86704 HEP B CORE ANTIBODY TOTAL: CPT

## 2019-01-17 PROCEDURE — 87340 HEPATITIS B SURFACE AG IA: CPT

## 2019-01-17 PROCEDURE — 85025 COMPLETE CBC W/AUTO DIFF WBC: CPT

## 2019-01-17 PROCEDURE — 86705 HEP B CORE ANTIBODY IGM: CPT

## 2019-01-17 PROCEDURE — 36415 COLL VENOUS BLD VENIPUNCTURE: CPT

## 2019-01-17 PROCEDURE — 86803 HEPATITIS C AB TEST: CPT

## 2019-01-17 NOTE — PATIENT INSTRUCTIONS
The patients Ct Scan is scheduled on 1/30/19 at [facility] per Lida  She was given instructions and 2 bottle of barium in the office  She will return in April to see Michelle BLAS

## 2019-01-17 NOTE — LETTER
January 17, 2019     Bess Pozo MD  [facility] [phone]    Patient: Debra Reynolds   YOB: 1955   Date of Visit: 1/17/2019       Dear Dr Rhiannon Eaton:    Thank you for referring Kenisha Gupta to me for evaluation  Below are my notes for this consultation  If you have questions, please do not hesitate to call me  I look forward to following your patient along with you  Sincerely,        Marda Dubin, MD        CC: No Recipients  Marda Dubin, MD  1/17/2019  9:47 AM  Sign at close encounter  [facility] - Outpatient Consultation  Debra Reynolds 66 y o  female MRN: [id number]  Encounter: [id number]          ASSESSMENT AND PLAN:      1  Abdominal pain, unspecified abdominal location  - Patient's abdominal pain is atypical   Differential diagnosis includes musculoskeletal pain versus hepatomegaly secondary to a the alcohol hepatitis versus peptic ulcer disease  Although I think peptic ulcer disease is  unlikely as patient's symptom is not associated with food intake and NSAIDs relieves her symptoms  Patient has no tenderness when the ribcage was palpated  Costochondritis is unlikely  - abdominal CT scan with IV contrast    - repeat  LFTs to screen for infectious hepatitis is versus alcoholic hepatitis  - if all the above tests were negative, will consider to do an upper endoscopy to rule out peptic ulcer disease   - patient was counseled to use Tylenol rather than NSAID 4 times a day for pain relief  - CT abdomen pelvis w wo contrast; Future  - CBC and differential; Future  - Basic metabolic panel; Future  - Hepatic function panel; Future  - Chronic Hepatitis Panel; Future    ______________________________________________________________________    HPI:      53-year-old female referred for evaluation of right side abdominal pain  Patient reported she has been having vague right-sided abdominal pain since September    She reported pain was persistent  It is not related with food intake  Lying down improved the pain but sometimes changing the position exacerbates the pain  She denies nausea vomiting, she has regular formed bowel movement  Denies stool change or any blood in the stool  She started taking ibuprofen in September 4 times a day to improve her pain with some relief of her pain  She denies weight loss  She underwent abdominal ultrasound, she was found to have a tiny subcapsular cyst    Her HIDA scan was normal   She has no gallstones on ultrasound  She denies family history of cancer  Patient admits she drinks 2 beers a day every day  She is also an intermittent smoker  REVIEW OF SYSTEMS:    CONSTITUTIONAL: Denies any fever, chills, rigors, and weight loss  HEENT: No earache or tinnitus  Denies hearing loss or visual disturbances  CARDIOVASCULAR: No chest pain or palpitations  RESPIRATORY: Denies any cough, hemoptysis, shortness of breath or dyspnea on exertion  GASTROINTESTINAL: As noted in the History of Present Illness  GENITOURINARY: No problems with urination  Denies any hematuria or dysuria  NEUROLOGIC: No dizziness or vertigo, denies headaches  MUSCULOSKELETAL: Denies any muscle or joint pain  SKIN: Denies skin rashes or itching  ENDOCRINE: Denies excessive thirst  Denies intolerance to heat or cold  PSYCHOSOCIAL: Denies depression or anxiety  Denies any recent memory loss         Historical Information   Past Medical History:   Diagnosis Date    Hypertension      Past Surgical History:   Procedure Laterality Date     SECTION      TONSILLECTOMY       Social History   History   Alcohol Use No     Comment: social drinker per allscript      History   Drug Use No     History   Smoking Status    Light Tobacco Smoker    Packs/day: 0 10    Years: 10 00    Types: Cigarettes    Start date:    Smokeless Tobacco    Never Used     Comment: current every day smoker per allscript      Family History   Problem Relation Age of Onset    Congenital heart disease Mother     Heart attack Mother         atrial     Congenital heart disease Father        Meds/Allergies       Current Outpatient Prescriptions:     irbesartan (AVAPRO) 150 mg tablet    Allergies   Allergen Reactions    Diphenhydramine     Gabapentin Eye Swelling and Rash           Objective     Blood pressure 126/82, pulse 65, temperature (!) 96 8 °F (36 °C), temperature source Tympanic, height 5' 3" (1 6 m), weight 68 9 kg (152 lb)  Body mass index is 26 93 kg/m²  PHYSICAL EXAM:      General Appearance:   Alert, cooperative, no distress patient appears to be uncomfortable when she is sitting straight  HEENT:   Normocephalic, atraumatic, anicteric      Neck:  Supple, symmetrical, trachea midline   Lungs:   Clear to auscultation bilaterally; no rales, rhonchi or wheezing; respirations unlabored   She does not have tenderness upon palpation on the rib cage  Heart[de-identified]   Regular rate and rhythm; no murmur, rub, or gallop  Abdomen:   Soft, non-tender, non-distended; normal bowel sounds; no masses, no organomegaly    Genitalia:   Deferred    Rectal:   Deferred    Extremities:  No cyanosis, clubbing or edema    Pulses:  2+ and symmetric    Skin:  No jaundice, rashes, or lesions    Lymph nodes:  No palpable cervical lymphadenopathy        Lab Results:   No visits with results within 1 Day(s) from this visit     Latest known visit with results is:   Appointment on 12/07/2017   Component Date Value    WBC 12/07/2017 6 71     RBC 12/07/2017 4 37     Hemoglobin 12/07/2017 14 2     Hematocrit 12/07/2017 41 5     MCV 12/07/2017 95     MCH 12/07/2017 32 5     MCHC 12/07/2017 34 2     RDW 12/07/2017 12 2     MPV 12/07/2017 9 7     Platelets 50/15/9381 220     nRBC 12/07/2017 0     Neutrophils Relative 12/07/2017 62     Lymphocytes Relative 12/07/2017 30     Monocytes Relative 12/07/2017 6     Eosinophils Relative 12/07/2017 2     Basophils Relative 12/07/2017 0     Neutrophils Absolute 12/07/2017 4 17     Lymphocytes Absolute 12/07/2017 2 01     Monocytes Absolute 12/07/2017 0 41     Eosinophils Absolute 12/07/2017 0 10     Basophils Absolute 12/07/2017 0 02     Sodium 12/07/2017 139     Potassium 12/07/2017 3 7     Chloride 12/07/2017 105     CO2 12/07/2017 26     ANION GAP 12/07/2017 8     BUN 12/07/2017 16     Creatinine 12/07/2017 0 58*    Glucose 12/07/2017 99     Calcium 12/07/2017 9 2     AST 12/07/2017 10     ALT 12/07/2017 20     Alkaline Phosphatase 12/07/2017 60     Total Protein 12/07/2017 6 8     Albumin 12/07/2017 3 7     Total Bilirubin 12/07/2017 0 36     eGFR 12/07/2017 101          Radiology Results:   No results found

## 2019-01-17 NOTE — PROGRESS NOTES
[facility] - Outpatient Consultation  Noa Zavala 66 y o  female MRN: [id number]  Encounter: [id number]          ASSESSMENT AND PLAN:      1  Abdominal pain, unspecified abdominal location  - Patient's abdominal pain is atypical   Differential diagnosis includes musculoskeletal pain versus hepatomegaly secondary to a the alcohol hepatitis versus peptic ulcer disease  Although I think peptic ulcer disease is  unlikely as patient's symptom is not associated with food intake and NSAIDs relieves her symptoms  Patient has no tenderness when the ribcage was palpated  Costochondritis is unlikely  - abdominal CT scan with IV contrast    - repeat  LFTs to screen for infectious hepatitis is versus alcoholic hepatitis  - if all the above tests were negative, will consider to do an upper endoscopy to rule out peptic ulcer disease   - patient was counseled to use Tylenol rather than NSAID 4 times a day for pain relief  - CT abdomen pelvis w wo contrast; Future  - CBC and differential; Future  - Basic metabolic panel; Future  - Hepatic function panel; Future  - Chronic Hepatitis Panel; Future    ______________________________________________________________________    HPI:      51-year-old female referred for evaluation of right side abdominal pain  Patient reported she has been having vague right-sided abdominal pain since September  She reported pain was persistent  It is not related with food intake  Lying down improved the pain but sometimes changing the position exacerbates the pain  She denies nausea vomiting, she has regular formed bowel movement  Denies stool change or any blood in the stool  She started taking ibuprofen in September 4 times a day to improve her pain with some relief of her pain  She denies weight loss    She underwent abdominal ultrasound, she was found to have a tiny subcapsular cyst    Her HIDA scan was normal   She has no gallstones on ultrasound  She denies family history of cancer  Patient admits she drinks 2 beers a day every day  She is also an intermittent smoker  REVIEW OF SYSTEMS:    CONSTITUTIONAL: Denies any fever, chills, rigors, and weight loss  HEENT: No earache or tinnitus  Denies hearing loss or visual disturbances  CARDIOVASCULAR: No chest pain or palpitations  RESPIRATORY: Denies any cough, hemoptysis, shortness of breath or dyspnea on exertion  GASTROINTESTINAL: As noted in the History of Present Illness  GENITOURINARY: No problems with urination  Denies any hematuria or dysuria  NEUROLOGIC: No dizziness or vertigo, denies headaches  MUSCULOSKELETAL: Denies any muscle or joint pain  SKIN: Denies skin rashes or itching  ENDOCRINE: Denies excessive thirst  Denies intolerance to heat or cold  PSYCHOSOCIAL: Denies depression or anxiety  Denies any recent memory loss  Historical Information   Past Medical History:   Diagnosis Date    Hypertension      Past Surgical History:   Procedure Laterality Date     SECTION      TONSILLECTOMY       Social History   History   Alcohol Use No     Comment: social drinker per allscript      History   Drug Use No     History   Smoking Status    Light Tobacco Smoker    Packs/day: 0 10    Years: 10 00    Types: Cigarettes    Start date:    Smokeless Tobacco    Never Used     Comment: current every day smoker per allscript      Family History   Problem Relation Age of Onset    Congenital heart disease Mother     Heart attack Mother         atrial     Congenital heart disease Father        Meds/Allergies       Current Outpatient Prescriptions:     irbesartan (AVAPRO) 150 mg tablet    Allergies   Allergen Reactions    Diphenhydramine     Gabapentin Eye Swelling and Rash           Objective     Blood pressure 126/82, pulse 65, temperature (!) 96 8 °F (36 °C), temperature source Tympanic, height 5' 3" (1 6 m), weight 68 9 kg (152 lb)   Body mass index is 26 93 kg/m²  PHYSICAL EXAM:      General Appearance:   Alert, cooperative, no distress patient appears to be uncomfortable when she is sitting straight  HEENT:   Normocephalic, atraumatic, anicteric      Neck:  Supple, symmetrical, trachea midline   Lungs:   Clear to auscultation bilaterally; no rales, rhonchi or wheezing; respirations unlabored   She does not have tenderness upon palpation on the rib cage  Heart[de-identified]   Regular rate and rhythm; no murmur, rub, or gallop  Abdomen:   Soft, non-tender, non-distended; normal bowel sounds; no masses, no organomegaly    Genitalia:   Deferred    Rectal:   Deferred    Extremities:  No cyanosis, clubbing or edema    Pulses:  2+ and symmetric    Skin:  No jaundice, rashes, or lesions    Lymph nodes:  No palpable cervical lymphadenopathy        Lab Results:   No visits with results within 1 Day(s) from this visit     Latest known visit with results is:   Appointment on 12/07/2017   Component Date Value    WBC 12/07/2017 6 71     RBC 12/07/2017 4 37     Hemoglobin 12/07/2017 14 2     Hematocrit 12/07/2017 41 5     MCV 12/07/2017 95     MCH 12/07/2017 32 5     MCHC 12/07/2017 34 2     RDW 12/07/2017 12 2     MPV 12/07/2017 9 7     Platelets 71/21/2546 220     nRBC 12/07/2017 0     Neutrophils Relative 12/07/2017 62     Lymphocytes Relative 12/07/2017 30     Monocytes Relative 12/07/2017 6     Eosinophils Relative 12/07/2017 2     Basophils Relative 12/07/2017 0     Neutrophils Absolute 12/07/2017 4 17     Lymphocytes Absolute 12/07/2017 2 01     Monocytes Absolute 12/07/2017 0 41     Eosinophils Absolute 12/07/2017 0 10     Basophils Absolute 12/07/2017 0 02     Sodium 12/07/2017 139     Potassium 12/07/2017 3 7     Chloride 12/07/2017 105     CO2 12/07/2017 26     ANION GAP 12/07/2017 8     BUN 12/07/2017 16     Creatinine 12/07/2017 0 58*    Glucose 12/07/2017 99     Calcium 12/07/2017 9 2     AST 12/07/2017 10     ALT 12/07/2017 20  Alkaline Phosphatase 12/07/2017 60     Total Protein 12/07/2017 6 8     Albumin 12/07/2017 3 7     Total Bilirubin 12/07/2017 0 36     eGFR 12/07/2017 101          Radiology Results:   No results found

## 2019-01-18 DIAGNOSIS — R10.11 RIGHT UPPER QUADRANT ABDOMINAL PAIN: Primary | ICD-10-CM

## 2019-01-18 LAB
HBV CORE AB SER QL: ABNORMAL
HBV CORE IGM SER QL: REACTIVE
HBV SURFACE AG SER QL: ABNORMAL
HCV AB SER QL: ABNORMAL

## 2019-01-24 ENCOUNTER — TELEPHONE (OUTPATIENT)
Dept: GASTROENTEROLOGY | Facility: CLINIC | Age: 64
End: 2019-01-24

## 2019-01-24 NOTE — TELEPHONE ENCOUNTER
Called patient, let her know that number she gave us to fax was not working  Patient requested that we send lab results to her home address

## 2019-01-30 ENCOUNTER — HOSPITAL ENCOUNTER (OUTPATIENT)
Dept: CT IMAGING | Facility: HOSPITAL | Age: 64
Discharge: HOME | End: 2019-01-30
Attending: INTERNAL MEDICINE
Payer: COMMERCIAL

## 2019-01-30 ENCOUNTER — APPOINTMENT (OUTPATIENT)
Dept: LAB | Facility: HOSPITAL | Age: 64
End: 2019-01-30
Attending: INTERNAL MEDICINE
Payer: COMMERCIAL

## 2019-01-30 DIAGNOSIS — R10.9 ABDOMINAL PAIN, UNSPECIFIED ABDOMINAL LOCATION: ICD-10-CM

## 2019-01-30 DIAGNOSIS — R10.11 RIGHT UPPER QUADRANT ABDOMINAL PAIN: ICD-10-CM

## 2019-01-30 PROCEDURE — 86706 HEP B SURFACE ANTIBODY: CPT

## 2019-01-30 PROCEDURE — 74177 CT ABD & PELVIS W/CONTRAST: CPT

## 2019-01-30 PROCEDURE — 86707 HEPATITIS BE ANTIBODY: CPT

## 2019-01-30 PROCEDURE — 87350 HEPATITIS BE AG IA: CPT

## 2019-01-30 PROCEDURE — 36415 COLL VENOUS BLD VENIPUNCTURE: CPT

## 2019-01-30 PROCEDURE — 87517 HEPATITIS B DNA QUANT: CPT

## 2019-01-30 RX ADMIN — IOHEXOL 100 ML: 350 INJECTION, SOLUTION INTRAVENOUS at 07:56

## 2019-01-31 LAB — HBV E AG SERPL QL IA: NEGATIVE

## 2019-02-01 LAB
HBV DNA SERPL NAA+PROBE-ACNC: NORMAL IU/ML
HBV DNA SERPL NAA+PROBE-LOG IU: NORMAL LOG10 IU/ML
HBV E AB SERPL QL IA: NEGATIVE
HBV SURFACE AB SER-ACNC: <3.1 MIU/ML
REF LAB TEST REF RANGE: NORMAL

## 2019-02-04 ENCOUNTER — TELEPHONE (OUTPATIENT)
Dept: GASTROENTEROLOGY | Facility: HOSPITAL | Age: 64
End: 2019-02-04

## 2019-02-04 NOTE — TELEPHONE ENCOUNTER
When giving result to patient, about diverticulosis  She said she did not discuss this with any doctor and has questions that I can not explain to her, she would like if someone can call and discuss diverticulosis         PLEASE Advise Patient

## 2019-02-04 NOTE — TELEPHONE ENCOUNTER
Called patient to discuss the findings on her CT scan  Reviewed the findings of diverticulosis and how this is unlikely the etiology of her abdominal pain  She continues to have right-sided abdominal pain daily, specifically with movement and change in position  She states she has "no gastro symptoms "  Discussed the finding of myomatous uterus and advised that this was not a new finding  She is frustrated with not having an answer from the CT scan for her right-sided pain  She may consider following up with PCP to explore a musculoskeletal cause  She has a follow up here recommended for April 2019 but was advised she can call if sooner evaluation is needed  All questions were answered  She thanked me for my time      Orquidea Conley PA-C

## 2019-02-04 NOTE — TELEPHONE ENCOUNTER
----- Message from Marda Dubin, MD sent at 2/1/2019  9:50 PM EST -----  CT showed a small liver cyst which is a benign condition and same finding as US  No signs of liver disease on the CT  She has diverticulosis on CT which is a benign condition in the colon

## 2019-02-06 NOTE — TELEPHONE ENCOUNTER
Spoke with pt  She states she has been on the internet & thinks she may have bursitis  I suggested that if she was not having GI sxs should follow-up with her PCP    Christiano Wahl

## 2019-02-06 NOTE — TELEPHONE ENCOUNTER
Pt is requesting a call from [facility] in regards to the results discussed on 2/4  [phone]

## 2019-02-07 ENCOUNTER — TELEPHONE (OUTPATIENT)
Dept: FAMILY MEDICINE CLINIC | Facility: CLINIC | Age: 64
End: 2019-02-07

## 2019-02-07 DIAGNOSIS — M70.70 BURSITIS OF HIP, UNSPECIFIED BURSA, UNSPECIFIED LATERALITY: Primary | ICD-10-CM

## 2019-02-07 NOTE — TELEPHONE ENCOUNTER
Pt called asking for you to look at her recent CT and give her your opinion  Pt believes she may have Hip Bursitis(iliopsoas)  Please give her a call

## 2019-03-04 ENCOUNTER — APPOINTMENT (EMERGENCY)
Dept: RADIOLOGY | Facility: HOSPITAL | Age: 64
End: 2019-03-04
Payer: COMMERCIAL

## 2019-03-04 ENCOUNTER — HOSPITAL ENCOUNTER (EMERGENCY)
Facility: HOSPITAL | Age: 64
Discharge: HOME | End: 2019-03-04
Attending: EMERGENCY MEDICINE | Admitting: EMERGENCY MEDICINE
Payer: COMMERCIAL

## 2019-03-04 VITALS
BODY MASS INDEX: 26.39 KG/M2 | SYSTOLIC BLOOD PRESSURE: 136 MMHG | WEIGHT: 149 LBS | HEART RATE: 112 BPM | TEMPERATURE: 99.3 F | OXYGEN SATURATION: 97 % | RESPIRATION RATE: 17 BRPM | DIASTOLIC BLOOD PRESSURE: 80 MMHG

## 2019-03-04 DIAGNOSIS — S93.402A LEFT ANKLE SPRAIN: ICD-10-CM

## 2019-03-04 DIAGNOSIS — S82.891A CLOSED FRACTURE OF RIGHT ANKLE, INITIAL ENCOUNTER: Primary | ICD-10-CM

## 2019-03-04 PROCEDURE — 99283 EMERGENCY DEPT VISIT LOW MDM: CPT

## 2019-03-04 PROCEDURE — 73610 X-RAY EXAM OF ANKLE: CPT

## 2019-03-04 RX ORDER — IRBESARTAN 75 MG/1
75 TABLET ORAL DAILY
COMMUNITY
End: 2019-06-14 | Stop reason: CLARIF

## 2019-03-04 NOTE — ED PROVIDER NOTES
History  Chief Complaint   Patient presents with    Fall     pt reports slipping on last step and falling forward last night  pt denies hitting head  pt c/o b/l ankle pain reports bruising and swelling  pt ambulatory to triage room  Patient is a 62-year-old female past medical history of hypertension and presents for evaluation of bilateral ankle pain  She states that last night she slipped on her stairs around 5:00 p m  Miriam Gonzales She states that she thought there was another step but missed took it and twisted her ankles  She states that she was able to ambulate after the fall  She has pain to bilateral ankles  There is swelling and bruising noted  She tried ice and naproxen today at 8:00 a m  He states this did help the pain  She states that if she is sitting still she has no pain however when she moves it or tries to weight bear there is pain  She has no history of fracture or surgery to the ankles  She denies any head injury or loss of consciousness  She denies numbness, tingling, weakness, other joint pain  Prior to Admission Medications   Prescriptions Last Dose Informant Patient Reported? Taking?   irbesartan (AVAPRO) 75 mg tablet   Yes Yes   Sig: Take 75 mg by mouth daily      Facility-Administered Medications: None       Past Medical History:   Diagnosis Date    Hypertension        Past Surgical History:   Procedure Laterality Date     SECTION      TONSILLECTOMY         Family History   Problem Relation Age of Onset    Congenital heart disease Mother     Heart attack Mother         atrial     Congenital heart disease Father      I have reviewed and agree with the history as documented      Social History     Tobacco Use    Smoking status: Light Tobacco Smoker     Packs/day: 0 10     Years: 10 00     Pack years: 1 00     Types: Cigarettes     Start date:     Smokeless tobacco: Never Used    Tobacco comment: current every day smoker per allscript    Substance Use Topics  Alcohol use: Yes     Alcohol/week: 1 2 oz     Types: 2 Cans of beer per week    Drug use: No        Review of Systems   Respiratory: Negative for shortness of breath  Cardiovascular: Negative for chest pain and leg swelling  Gastrointestinal: Negative for abdominal pain, diarrhea, nausea and vomiting  Musculoskeletal: Positive for arthralgias, gait problem and joint swelling  Negative for back pain and neck pain  Skin: Positive for color change  Negative for rash and wound  Neurological: Negative for dizziness, weakness, numbness and headaches  All other systems reviewed and are negative  Physical Exam  Physical Exam   Constitutional: She is oriented to person, place, and time  She appears well-developed and well-nourished  No distress  HENT:   Head: Normocephalic and atraumatic  Right Ear: External ear normal    Left Ear: External ear normal    Nose: Nose normal    Eyes: EOM are normal    Neck: Normal range of motion  Cardiovascular: Normal rate, regular rhythm and normal heart sounds  Exam reveals no gallop and no friction rub  No murmur heard  Pulmonary/Chest: Effort normal and breath sounds normal  No stridor  No respiratory distress  She has no wheezes  Musculoskeletal:   The right ankle with tenderness, swelling and bruising located to the lateral aspect  No obvious deformity  No significant medial malleolar pain  No foot pain to palpation  Pain with range of motion  Neurovascularly intact distally  Pedal pulses intact, capillary refill intact  Left ankle with tenderness to palpation over the medial and lateral malleoli  There is swelling and bruising noted  No deformity  No foot pain to palpation  Range of motion intact with some discomfort  Pedal pulses intact  Capillary refill intact  Neurovascularly intact distally  Neurological: She is alert and oriented to person, place, and time  Skin: Skin is warm and dry  Capillary refill takes less than 2 seconds  She is not diaphoretic  Psychiatric: She has a normal mood and affect  Her behavior is normal    Nursing note and vitals reviewed  Vital Signs  ED Triage Vitals [03/04/19 1311]   Temperature Pulse Respirations Blood Pressure SpO2   99 3 °F (37 4 °C) (!) 112 17 136/80 97 %      Temp src Heart Rate Source Patient Position - Orthostatic VS BP Location FiO2 (%)   -- Monitor Sitting Right arm --      Pain Score       No Pain           Vitals:    03/04/19 1311   BP: 136/80   Pulse: (!) 112   Patient Position - Orthostatic VS: Sitting       Visual Acuity      ED Medications  Medications - No data to display    Diagnostic Studies  Results Reviewed     None                 XR ankle 3+ views RIGHT   Final Result by Linda Houston MD (03/04 2626)      Minimally displaced distal fibular fracture  Workstation performed: [id number]         XR ankle 3+ views LEFT   Final Result by Linda Houston MD (03/04 9545)      Soft tissue swelling over the lateral malleolus without acute osseous injury  Workstation performed: [id number]                    Procedures  Static Splint Application  Date/Time: 3/4/2019 2:40 PM  Performed by: Nasim Chowdhury PA-C  Authorized by: Nasim Chowdhury PA-C     Patient location:  ED  Procedure performed by emergency physician: performed by ED tech and checked by me     Consent:     Consent obtained:  Verbal    Consent given by:  Patient    Risks discussed:  Discoloration, numbness, pain and swelling    Alternatives discussed:  Referral  [place] protocol:     Procedure explained and questions answered to patient or proxy's satisfaction: yes      Relevant documents present and verified: yes      Test results available and properly labeled: yes      Radiology Images displayed and confirmed    If images not available, report reviewed: yes      Patient identity confirmed:  Verbally with patient  Indication:     Indications: fracture    Pre-procedure details:     Sensation:  Normal Skin color:  Pink   Procedure details:     Laterality:  Right    Location:  Ankle    Splint type:  Sugar tong and short leg    Supplies:  Ortho-Glass, cotton padding and elastic bandage  Post-procedure details:     Pain:  Improved    Sensation:  Normal    Neurovascular Exam: skin pink, capillary refill <2 sec and skin intact, warm, and dry      Patient tolerance of procedure: Tolerated well, no immediate complications           Phone Contacts  ED Phone Contact    ED Course                               MDM  Number of Diagnoses or Management Options  Closed fracture of right ankle, initial encounter:   Left ankle sprain:   Diagnosis management comments: Plan will x-ray the left and right ankles  Patient declined Tylenol Motrin here  X-rays reviewed by me  Left ankle without any acute bony abnormality or fracture  There is soft tissue swelling  Right ankle with oblique fracture to the distal fibula  Ankle mortise intact  Associated soft tissue swelling  Discussed results with patient  Explained that x-rays will be further read by radiologist and if any discrepancies arise she will be contacted  Posterior leg and sugar-tong splint applied to the right ankle  Discussed she should be nonweightbearing  Given crutches  Discussed rest, ice, elevation  Instructed to follow up with Orthopedics in 1 day  Call to schedule an appointment inform the office she was seen in the ED for fracture  Discussed strict return precautions if symptoms worsen or new symptoms arise  Patient states understanding and agrees with plan         Amount and/or Complexity of Data Reviewed  Tests in the radiology section of CPT®: ordered and reviewed  Independent visualization of images, tracings, or specimens: yes    Patient Progress  Patient progress: stable      Disposition  Final diagnoses:   Closed fracture of right ankle, initial encounter   Left ankle sprain     Time reflects when diagnosis was documented in both MDM as applicable and the Disposition within this note     Time User Action Codes Description Comment    3/4/2019  2:48 PM Carly James Add [U54 001K] Closed fracture of right ankle, initial encounter     3/4/2019  2:49 PM Carly James Add [[id number]] Left ankle sprain       ED Disposition     ED Disposition Condition Date/Time Comment    Discharge Stable Mon Mar 4, 2019  2:48 PM Debra Reynolds discharge to home/self care  Follow-up Information     Follow up With Specialties Details Why Contact Info Additional Information    Bess Pozo MD Family Medicine Schedule an appointment as soon as possible for a visit in 1 day  [address] [place], [place] [address] Schedule an appointment as soon as possible for a visit in 1 day Inform the office you have an ankle fracture [address] [facility] [id number]  [address] [place] [address] [address] [place] [address],  [address] [place] [address], [id number]          Discharge Medication List as of 3/4/2019  2:50 PM      CONTINUE these medications which have NOT CHANGED    Details   irbesartan (AVAPRO) 75 mg tablet Take 75 mg by mouth daily, Historical Med           No discharge procedures on file      ED Provider  Electronically Signed by           Jenifer Schultz PA-C  03/04/19 4905

## 2019-03-06 ENCOUNTER — VBI (OUTPATIENT)
Dept: ADMINISTRATIVE | Facility: OTHER | Age: 64
End: 2019-03-06

## 2019-03-06 ENCOUNTER — VBI (OUTPATIENT)
Dept: FAMILY MEDICINE CLINIC | Facility: CLINIC | Age: 64
End: 2019-03-06

## 2019-03-06 NOTE — TELEPHONE ENCOUNTER
Jadyn Seals    ED Visit Information     Ed visit date: 3/4/19  Diagnosis Description: Closed fracture of right ankle, initial encounter; Left ankle sprain  In Network? Yes [address]  Discharge status: Home  Discharged with meds ? No  Number of ED visits to date: 1  ED Severity:4     Outreach Information    Outreach successful: Yes 1   letter mailed:0  Date Finalized:3/6/19    Care Coordination    Follow up appointment with specialilty: yes ortho [facility]   Transportation issues ? No    [employer] type:  3 Day Outreach  Emergent necessity warranted by diagnosis:  Yes  [facility] PCP:  Yes  Transportation:  Friend/Family Transport  Called PCP first?:  No  Feels able to call PCP for urgent problems ?:  Yes  Understands what emergencies can be handled by PCP ?:  Yes  Ever any problems getting appointment with PCP for minor emergency/urgency problems?:  Yes  Practice Contacted Patient ?:  No  Pt had ED follow up with pcp/staff ?:  No    Reason Patient went to ED instead of Urgent Care or PCP?:  Perceived Severity of Illness  Urgent care Education?:  No  Spoke to [facility] today she stated she is doing better today declined follow up with PCP b/c she had follow up with an Ortho  At [place] [id number]   Pt is aware of [facility] urgent care locations in her area

## 2019-04-05 ENCOUNTER — TELEPHONE (OUTPATIENT)
Dept: FAMILY MEDICINE CLINIC | Facility: CLINIC | Age: 64
End: 2019-04-05

## 2019-04-05 DIAGNOSIS — R05.9 COUGH: Primary | ICD-10-CM

## 2019-04-05 RX ORDER — PROMETHAZINE HYDROCHLORIDE AND CODEINE PHOSPHATE 6.25; 1 MG/5ML; MG/5ML
5 SYRUP ORAL EVERY 4 HOURS PRN
Qty: 120 ML | Refills: 0 | Status: SHIPPED | OUTPATIENT
Start: 2019-04-05 | End: 2019-06-14 | Stop reason: CLARIF

## 2019-05-23 ENCOUNTER — TRANSCRIBE ORDERS (OUTPATIENT)
Dept: ADMINISTRATIVE | Facility: HOSPITAL | Age: 64
End: 2019-05-23

## 2019-05-23 DIAGNOSIS — Z12.39 BREAST SCREENING, UNSPECIFIED: Primary | ICD-10-CM

## 2019-05-29 ENCOUNTER — TELEPHONE (OUTPATIENT)
Dept: OBGYN CLINIC | Facility: MEDICAL CENTER | Age: 64
End: 2019-05-29

## 2019-05-29 DIAGNOSIS — Z12.31 ENCOUNTER FOR SCREENING MAMMOGRAM FOR BREAST CANCER: Primary | ICD-10-CM

## 2019-05-31 ENCOUNTER — HOSPITAL ENCOUNTER (OUTPATIENT)
Dept: MAMMOGRAPHY | Facility: CLINIC | Age: 64
Discharge: HOME | End: 2019-05-31
Payer: COMMERCIAL

## 2019-05-31 VITALS — WEIGHT: 149 LBS | BODY MASS INDEX: 23.95 KG/M2 | HEIGHT: 66 IN

## 2019-05-31 DIAGNOSIS — Z12.39 BREAST SCREENING, UNSPECIFIED: ICD-10-CM

## 2019-05-31 PROCEDURE — 77067 SCR MAMMO BI INCL CAD: CPT

## 2019-06-04 ENCOUNTER — TELEPHONE (OUTPATIENT)
Dept: FAMILY MEDICINE CLINIC | Facility: CLINIC | Age: 64
End: 2019-06-04

## 2019-06-13 ENCOUNTER — TELEPHONE (OUTPATIENT)
Dept: FAMILY MEDICINE CLINIC | Facility: CLINIC | Age: 64
End: 2019-06-13

## 2019-06-13 DIAGNOSIS — I10 ESSENTIAL HYPERTENSION: ICD-10-CM

## 2019-06-14 DIAGNOSIS — I10 ESSENTIAL HYPERTENSION: Primary | ICD-10-CM

## 2019-06-14 RX ORDER — IRBESARTAN 150 MG/1
150 TABLET ORAL
Qty: 30 TABLET | Refills: 5 | Status: SHIPPED | OUTPATIENT
Start: 2019-06-14 | End: 2019-06-14 | Stop reason: CLARIF

## 2019-06-14 RX ORDER — VALSARTAN 160 MG/1
160 TABLET ORAL DAILY
Qty: 30 TABLET | Refills: 5 | Status: SHIPPED | OUTPATIENT
Start: 2019-06-14 | End: 2019-12-03 | Stop reason: SDUPTHER

## 2019-06-19 ENCOUNTER — HOSPITAL ENCOUNTER (OUTPATIENT)
Dept: MAMMOGRAPHY | Facility: CLINIC | Age: 64
Discharge: HOME | End: 2019-06-19
Payer: COMMERCIAL

## 2019-06-19 ENCOUNTER — HOSPITAL ENCOUNTER (OUTPATIENT)
Dept: ULTRASOUND IMAGING | Facility: CLINIC | Age: 64
Discharge: HOME | End: 2019-06-19
Payer: COMMERCIAL

## 2019-06-19 DIAGNOSIS — R92.8 ABNORMAL MAMMOGRAM: ICD-10-CM

## 2019-06-19 PROCEDURE — G0279 TOMOSYNTHESIS, MAMMO: HCPCS

## 2019-06-19 PROCEDURE — 77065 DX MAMMO INCL CAD UNI: CPT

## 2019-06-19 PROCEDURE — 76642 ULTRASOUND BREAST LIMITED: CPT

## 2019-07-01 ENCOUNTER — ANNUAL EXAM (OUTPATIENT)
Dept: OBGYN CLINIC | Facility: MEDICAL CENTER | Age: 64
End: 2019-07-01
Payer: COMMERCIAL

## 2019-07-01 VITALS
DIASTOLIC BLOOD PRESSURE: 80 MMHG | SYSTOLIC BLOOD PRESSURE: 112 MMHG | WEIGHT: 146 LBS | HEIGHT: 66 IN | BODY MASS INDEX: 23.46 KG/M2

## 2019-07-01 DIAGNOSIS — Z01.419 ENCOUNTER FOR GYNECOLOGICAL EXAMINATION WITH PAPANICOLAOU SMEAR OF CERVIX: ICD-10-CM

## 2019-07-01 DIAGNOSIS — M54.5 RIGHT LOW BACK PAIN, UNSPECIFIED CHRONICITY, WITH SCIATICA PRESENCE UNSPECIFIED: Primary | ICD-10-CM

## 2019-07-01 PROCEDURE — 87624 HPV HI-RISK TYP POOLED RSLT: CPT | Performed by: NURSE PRACTITIONER

## 2019-07-01 PROCEDURE — S0612 ANNUAL GYNECOLOGICAL EXAMINA: HCPCS | Performed by: NURSE PRACTITIONER

## 2019-07-01 PROCEDURE — G0145 SCR C/V CYTO,THINLAYER,RESCR: HCPCS | Performed by: NURSE PRACTITIONER

## 2019-07-01 NOTE — PROGRESS NOTES
ASSESSMENT & PLAN: Denise Looney is a 60 y o  [id number] with normal gynecologic exam     1   Routine well woman exam done today  2  Pap and HPV:  The patient's last pap and hpv was   It was normal     Pap and cotesting was done today  Current ASCCP Guidelines reviewed  3   Mammogram ordered- has a 6 month f/u scheduled  4   Colonoscopy up to date  5  The following were reviewed in today's visit: breast self exam, menopause, adequate intake of calcium and vitamin D, exercise and healthy diet  6  Rto in one year for annual gyn exam     CC:  Annual Gynecologic Examination    HPI: Denise Looney is a 60 y o  [id number] who presents for annual gynecologic examination  She has the following concerns: has been dealing with r sided abd pain w radiation to her low back since October  All testing has been normal  Didn't pursue going back to pcp b/c  has heart issues and she is working full time and taking care of him  States that pain is worsening and sometimes is a 12/10 on pain scale  Will discuss pt with drs and offer her appropriate referral to Pain management or Ortho  Had ct scan for spine in 2017 that  revealed r foraminal extrusion at L4/L5, with moderate R foraminal stenosis  Health Maintenance:    She wears her seatbelt routinely  She does perform regular monthly self breast exams  She feels safe at home  Pap: 2019  Last mammogram: 2019  Last colonoscopy: up to date    Past Medical History:   Diagnosis Date    Hypertension        Past Surgical History:   Procedure Laterality Date    BREAST BIOPSY Right      SECTION      TONSILLECTOMY      TUBAL LIGATION         Past OB/Gyn History:  OB History        3    Para   2    Term   2            AB   1    Living   2       SAB        TAB        Ectopic        Multiple        Live Births                   Pt does not have menstrual issues      History of sexually transmitted infection: No   History of abnormal pap smears: No      Patient is currently sexually active  heterosexual   The current method of family planning is post menopausal status  Family History   Problem Relation Age of Onset    Congenital heart disease Mother     Heart attack Mother         atrial     Congenital heart disease Father     No Known Problems Sister     No Known Problems Daughter     No Known Problems Maternal Grandmother     No Known Problems Maternal Grandfather     No Known Problems Paternal Grandmother     No Known Problems Paternal Grandfather     No Known Problems Sister     No Known Problems Sister        Social History:  Social History     Socioeconomic History    Marital status: /Civil Union     Spouse name: Not on file    Number of children: 2    Years of education: Not on file    Highest education level: Not on file   Occupational History     Comment: full time employment    Social Needs    Financial resource strain: Not on file    Food insecurity:     Worry: Not on file     Inability: Not on file    Transportation needs:     Medical: Not on file     Non-medical: Not on file   Tobacco Use    Smoking status: Former Smoker     Packs/day: 0 10     Years: 10 00     Pack years: 1 00     Start date: 2008    Smokeless tobacco: Never Used   Substance and Sexual Activity    Alcohol use:  Yes     Alcohol/week: 2 0 standard drinks     Types: 2 Cans of beer per week    Drug use: No    Sexual activity: Yes     Partners: Male     Birth control/protection: Female Sterilization   Lifestyle    Physical activity:     Days per week: Not on file     Minutes per session: Not on file    Stress: Not on file   Relationships    Social connections:     Talks on phone: Not on file     Gets together: Not on file     Attends [employer] service: Not on file     Active member of club or organization: Not on file     Attends meetings of clubs or organizations: Not on file     Relationship status: Not on file    Intimate partner violence:     Fear of current or ex partner: Not on file     Emotionally abused: Not on file     Physically abused: Not on file     Forced sexual activity: Not on file   Other Topics Concern    Not on file   Social History Narrative    Denied history of domestic violence    House    Lives with family    No advance directives    No living will    [place]    Supportive and safe    3 grandchild, 2 children     Presently lives with spouse  Patient is currently employed as a  at [employer]  Likes her job  Allergies   Allergen Reactions    Diphenhydramine     Gabapentin Eye Swelling and Rash       Current Outpatient Medications:     valsartan (DIOVAN) 160 mg tablet, Take 1 tablet (160 mg total) by mouth daily, Disp: 30 tablet, Rfl: 5      Review of Systems  Constitutional :no fever, feels well, no tiredness, no recent weight gain or loss  ENT: no ear ache, no loss of hearing, no nosebleeds or nasal discharge, no sore throat or hoarseness  Cardiovascular: no complaints of slow or fast heart beat, no chest pain, no palpitations, no leg claudication or lower extremity edema  Respiratory: no complaints of shortness of shortness of breath, no ARDON  Breasts:no complaints of breast pain, breast lump, or nipple discharge  Gastrointestinal: no complaints of abdominal pain, constipation, nausea, vomiting, or diarrhea or bloody stools  Genitourinary : no complaints of dysuria, incontinence, pelvic pain, no dysmenorrhea, vaginal discharge or abnormal vaginal bleeding and as noted in HPI  Musculoskeletal: no complaints of arthralgia, no myalgia, no joint swelling or stiffness, no limb pain or swelling    Integumentary: no complaints of skin rash or lesion, itching or dry skin  Neurological: no complaints of headache, no confusion, no numbness or tingling, no dizziness or fainting    Objective      /80   Ht 5' 6" (1 676 m)   Wt 66 2 kg (146 lb)   BMI 23 57 kg/m²     General:   appears stated age, cooperative, alert normal mood and affect   Neck: normal, supple,trachea midline, no masses   Heart: regular rate and rhythm, S1, S2 normal, no murmur, click, rub or gallop   Lungs: clear to auscultation bilaterally   Breasts: normal appearance, no masses or tenderness   Abdomen: soft, non-tender, without masses or organomegaly   Vulva: normal female genitalia   Vagina: normal vagina   Urethra: normal   Cervix: Normal, no discharge  Uterus: normal size, contour, position, consistency, mobility, non-tender   Adnexa: no mass, fullness, tenderness   Lymphatic palpation of lymph nodes in neck, axilla, groin and/or other locations: no lymphadenopathy or masses noted   Skin normal skin turgor and no rashes     Psychiatric orientation to person, place, and time: normal  mood and affect: normal

## 2019-07-03 LAB
HPV HR 12 DNA CVX QL NAA+PROBE: NEGATIVE
HPV16 DNA CVX QL NAA+PROBE: NEGATIVE
HPV18 DNA CVX QL NAA+PROBE: NEGATIVE

## 2019-07-09 LAB
LAB AP GYN PRIMARY INTERPRETATION: NORMAL
Lab: NORMAL

## 2019-07-12 ENCOUNTER — TELEPHONE (OUTPATIENT)
Dept: FAMILY MEDICINE CLINIC | Facility: CLINIC | Age: 64
End: 2019-07-12

## 2019-07-12 DIAGNOSIS — R19.7 DIARRHEA, UNSPECIFIED TYPE: Primary | ICD-10-CM

## 2019-07-12 RX ORDER — DIPHENOXYLATE HYDROCHLORIDE AND ATROPINE SULFATE 2.5; .025 MG/1; MG/1
1 TABLET ORAL 4 TIMES DAILY PRN
Qty: 20 TABLET | Refills: 0 | Status: SHIPPED | OUTPATIENT
Start: 2019-07-12 | End: 2019-07-30 | Stop reason: ALTCHOICE

## 2019-07-12 NOTE — TELEPHONE ENCOUNTER
Pt called stating she has been having diarrhea for the last 10 days Imodium has not been working   Please advise

## 2019-07-12 NOTE — TELEPHONE ENCOUNTER
Per dr Melia Chanel she'll send something to the pharmacy for the pt   I left detailed message for pt

## 2019-07-23 ENCOUNTER — TRANSCRIBE ORDERS (OUTPATIENT)
Dept: ADMINISTRATIVE | Facility: HOSPITAL | Age: 64
End: 2019-07-23

## 2019-07-23 DIAGNOSIS — R92.8 ABNORMAL MAMMOGRAM: Primary | ICD-10-CM

## 2019-07-30 ENCOUNTER — CONSULT (OUTPATIENT)
Dept: PAIN MEDICINE | Facility: MEDICAL CENTER | Age: 64
End: 2019-07-30
Payer: COMMERCIAL

## 2019-07-30 VITALS
HEART RATE: 69 BPM | WEIGHT: 147.6 LBS | SYSTOLIC BLOOD PRESSURE: 133 MMHG | BODY MASS INDEX: 23.72 KG/M2 | DIASTOLIC BLOOD PRESSURE: 85 MMHG | HEIGHT: 66 IN | RESPIRATION RATE: 14 BRPM

## 2019-07-30 DIAGNOSIS — R10.31 ABDOMINAL WALL PAIN IN BOTH LOWER QUADRANTS: ICD-10-CM

## 2019-07-30 DIAGNOSIS — R10.32 ABDOMINAL WALL PAIN IN BOTH LOWER QUADRANTS: ICD-10-CM

## 2019-07-30 PROCEDURE — 99244 OFF/OP CNSLTJ NEW/EST MOD 40: CPT | Performed by: PHYSICAL MEDICINE & REHABILITATION

## 2019-07-30 RX ORDER — NAPROXEN 250 MG/1
250 TABLET ORAL 2 TIMES DAILY WITH MEALS
COMMUNITY
End: 2021-01-27 | Stop reason: ALTCHOICE

## 2019-07-30 NOTE — PROGRESS NOTES
Assessment  1  Abdominal wall pain in both lower quadrants        Plan  1  We will attempt bilateral transversus abdominis plane blocks to hopefully improve some of her abdominal pain  The use of direct ultrasound visualization of the needle (rather than a non-guided injection) is required for this procedure to ensure accurate injection placement so there can be diagnostic specificity when evaluating effectiveness of the injection, and for safety purposes to minimize risk of bleeding or injury to surrounding structures  My impressions and treatment recommendations were discussed in detail with the patient who verbalized understanding and had no further questions  Discharge instructions were provided  I personally saw and examined the patient and I agree with the above discussed plan of care  No orders of the defined types were placed in this encounter  New Medications Ordered This Visit   Medications    ibuprofen (MOTRIN) 100 mg/5 mL suspension     Sig: Take 200 mg by mouth every 4 (four) hours as needed for mild pain    naproxen (NAPROSYN) 250 mg tablet     Sig: Take 250 mg by mouth 2 (two) times a day with meals       History of Present Illness    Norman Mtz is a 60 y o  female referred for consultation regarding lower back pain however the patient is actually experiencing abdominal wall pain at this point  This is affecting the lower quadrant bilaterally and is likely related to the abdominal wall musculature  She is describing moderate to severe intensity pain which had been present for about 10 months  She is unable to determine any inciting event or trauma  She states the pain is nearly constant and without any typical pattern, characterized as pressure-like, throbbing, sharp  Aggravating factors include lying down, standing, bending, walking  She also notes aggravation with exercise, coughing, sneezing  She is unable to determine any significant alleviating factors      Diagnostic studies include CT scan of the abdomen and pelvis  This did reveal a small fat containing umbilical hernia but nothing else to account for her current symptoms  Currently using ibuprofen and naproxen with mild relief  Seeking further benefit at this time  I have personally reviewed and/or updated the patient's past medical history, past surgical history, family history, social history, current medications, allergies, and vital signs today  Review of Systems   Constitutional: Negative for fever and unexpected weight change  HENT: Negative for trouble swallowing  Eyes: Negative for visual disturbance  Respiratory: Negative for shortness of breath and wheezing  Cardiovascular: Negative for chest pain and palpitations  Gastrointestinal: Positive for abdominal pain  Negative for constipation, diarrhea, nausea and vomiting  Endocrine: Negative for cold intolerance, heat intolerance and polydipsia  Genitourinary: Negative for difficulty urinating and frequency  Musculoskeletal: Positive for back pain (b/l lumbar radiating to the groin  )  Negative for arthralgias, gait problem, joint swelling and myalgias  Skin: Negative for rash  Neurological: Negative for dizziness, seizures, syncope, weakness and headaches  Hematological: Does not bruise/bleed easily  Psychiatric/Behavioral: Negative for dysphoric mood  All other systems reviewed and are negative        Patient Active Problem List   Diagnosis    Acute right-sided low back pain with right-sided sciatica    Hypertension    Spinal stenosis of lumbar region    Cough       Past Medical History:   Diagnosis Date    Chronic pain disorder     Hypertension     Low back pain        Past Surgical History:   Procedure Laterality Date    BREAST BIOPSY Right      SECTION      TONSILLECTOMY      TUBAL LIGATION         Family History   Problem Relation Age of Onset    Congenital heart disease Mother     Heart attack Mother atrial     Congenital heart disease Father     No Known Problems Sister     No Known Problems Daughter     No Known Problems Maternal Grandmother     No Known Problems Maternal Grandfather     No Known Problems Paternal Grandmother     No Known Problems Paternal Grandfather     No Known Problems Sister     No Known Problems Sister        Social History     Occupational History     Comment: full time employment     Occupation: clerical   Tobacco Use    Smoking status: Former Smoker     Packs/day: 0 10     Years: 10 00     Pack years: 1 00     Start date: 2008    Smokeless tobacco: Never Used   Substance and Sexual Activity    Alcohol use: Yes     Alcohol/week: 2 0 standard drinks     Types: 2 Cans of beer per week    Drug use: No    Sexual activity: Yes     Partners: Male     Birth control/protection: Female Sterilization       Current Outpatient Medications on File Prior to Visit   Medication Sig    ibuprofen (MOTRIN) 100 mg/5 mL suspension Take 200 mg by mouth every 4 (four) hours as needed for mild pain    naproxen (NAPROSYN) 250 mg tablet Take 250 mg by mouth 2 (two) times a day with meals    valsartan (DIOVAN) 160 mg tablet Take 1 tablet (160 mg total) by mouth daily    [DISCONTINUED] diphenoxylate-atropine (LOMOTIL) 2 5-0 025 mg per tablet Take 1 tablet by mouth 4 (four) times a day as needed for diarrhea     No current facility-administered medications on file prior to visit  Allergies   Allergen Reactions    Diphenhydramine     Gabapentin Eye Swelling and Rash       Physical Exam    /85   Pulse 69   Resp 14   Ht 5' 6" (1 676 m)   Wt 67 kg (147 lb 9 6 oz)   BMI 23 82 kg/m²     Constitutional: normal, well developed, well nourished, alert, in no distress and non-toxic and no overt pain behavior    Eyes: anicteric  HEENT: grossly intact  Neck: supple, symmetric, trachea midline and no masses   Pulmonary:even and unlabored  Cardiovascular:No edema or pitting edema present  Skin:Normal without rashes or lesions and well hydrated  Psychiatric:Mood and affect appropriate  Neurologic:Cranial Nerves II-XII grossly intact  Musculoskeletal:normal, except for tenderness to palpation along the lower abdominal wall musculature especially with rotation of the pelvis actively which reproduces her pain, she does have some continuation of pain towards the back as well with this      Imaging

## 2019-08-15 ENCOUNTER — PROCEDURE VISIT (OUTPATIENT)
Dept: PAIN MEDICINE | Facility: MEDICAL CENTER | Age: 64
End: 2019-08-15
Payer: COMMERCIAL

## 2019-08-15 DIAGNOSIS — R10.9 ABDOMINAL PAIN, UNSPECIFIED ABDOMINAL LOCATION: Primary | ICD-10-CM

## 2019-08-15 PROCEDURE — 64488 TAP BLOCK BI INJECTION: CPT | Performed by: PHYSICAL MEDICINE & REHABILITATION

## 2019-08-15 RX ORDER — METHYLPREDNISOLONE ACETATE 40 MG/ML
40 INJECTION, SUSPENSION INTRA-ARTICULAR; INTRALESIONAL; INTRAMUSCULAR; SOFT TISSUE ONCE
Status: COMPLETED | OUTPATIENT
Start: 2019-08-15 | End: 2019-08-15

## 2019-08-15 RX ADMIN — METHYLPREDNISOLONE ACETATE 40 MG: 40 INJECTION, SUSPENSION INTRA-ARTICULAR; INTRALESIONAL; INTRAMUSCULAR; SOFT TISSUE at 16:11

## 2019-08-15 RX ADMIN — METHYLPREDNISOLONE ACETATE 40 MG: 40 INJECTION, SUSPENSION INTRA-ARTICULAR; INTRALESIONAL; INTRAMUSCULAR; SOFT TISSUE at 16:00

## 2019-08-15 NOTE — PROGRESS NOTES
Indication: Abdominal pain  Preprocedure diagnosis:  1  Abdominal wall pain  Postprocedure diagnosis:  1  Abdominal wall pain    Procedure:  ultrasound-guided bilateral transversus abdominis plane (TAP) injection    After discussing the risks, benefits, and alternatives to the procedure, the patient expressed understanding and wished to proceed  The patient was brought to the procedure suite and placed in the supine position  A procedural pause was conducted to verify:  correct patient identity, procedure to be performed and as applicable, correct side and site, correct patient position, and availability of implants, special equipment or special requirements  A simple surgical tray was used  Prior to the procedure, the TAP was examined with a 12 MHz linear transducer to visualize external oblique, internal oblique( IO) and transversus abdominis (TAP)- and the TAP layer between the IO and TAP, to determine the optimal needle path  Following this, the abdominal wall was prepared with a ChloraPrep scrub, then re-examined using the same transducer, a sterile ultrasound transducer cover, and sterile ultrasound transducer gel  Thereafter, using ultrasound guidance, 2 5 inch 25-gauge needle was advanced into the TAP  After visualization of the tip and negative aspiration for blood, a mixture of 40 mg Depo-Medrol in 4 mL of 0 25% bupivacaine was injected into the TAP  Following the injection, the needle was withdrawn  The procedure was then repeated on the opposite side in the exact same fashion  The patient tolerated the procedure well and there were no apparent complications  After an appropriate amount of observation, the patient was dismissed from the clinic in good condition under their own power

## 2019-08-22 ENCOUNTER — TELEPHONE (OUTPATIENT)
Dept: PAIN MEDICINE | Facility: MEDICAL CENTER | Age: 64
End: 2019-08-22

## 2019-08-22 ENCOUNTER — TELEPHONE (OUTPATIENT)
Dept: PAIN MEDICINE | Facility: CLINIC | Age: 64
End: 2019-08-22

## 2019-08-22 NOTE — TELEPHONE ENCOUNTER
Patient states she has no relief from her injections she had a week ago  She would like to discuss her status with a nurse   Please advise, brandee    Call back# [phone]

## 2019-08-23 NOTE — TELEPHONE ENCOUNTER
S/P JAG TAP BLOCK, USGI on 8/15/19    S/W pt  Pt stated she had no change yet in her pain  Advised pt it takes up to 2 weeks to see the full effect of the steroids  Pt stated she is taking 2 tablets of Advil daily  Advised pt she can take it more frequent and to follow the directions on the bottle  Advised pt can take tylenol, up to 3,000 mg in 24 hrs and to follow the directions on the bottle  Pt stated ice and heat does not help  Pt will give it more time and will C/B with any issues  Pt verbalized understanding

## 2019-09-12 NOTE — TELEPHONE ENCOUNTER
Patient is calling in because and received a text stating that she has an appt Monday 9/16/19  She said that she didn't have any change since the last injection  She also said that since she last spoke with someone, they were suppose to give her a call back  Which they did not  So she wants to know if she can just come back in for another injection? Why does she have to come back in to speak it when no one called her back the last time?     Please follow up with patient

## 2019-09-12 NOTE — TELEPHONE ENCOUNTER
RN s/w pt regarding previous  RN read last task to pt regarding f/u  RN advised that Tyrel Escamilla left it as pt to call back if needed  Pt also had appt for 9/16 for 4 week f/u to re evaluate  RN advised that she should keep her f/u to be evaluated in person, to see what else there is to offer for ongoing pian  RN confirmed that pt did not have ANY relief from last procedure  Pt agreeable to come in for re eval as pain is a 10/10 and advil and tylenol heat/ice not helping     --please advise --  Agree to keep appt to re eval pain ?

## 2019-09-16 ENCOUNTER — OFFICE VISIT (OUTPATIENT)
Dept: PAIN MEDICINE | Facility: MEDICAL CENTER | Age: 64
End: 2019-09-16
Payer: COMMERCIAL

## 2019-09-16 VITALS
RESPIRATION RATE: 14 BRPM | HEART RATE: 72 BPM | DIASTOLIC BLOOD PRESSURE: 76 MMHG | BODY MASS INDEX: 24.04 KG/M2 | HEIGHT: 66 IN | SYSTOLIC BLOOD PRESSURE: 108 MMHG | WEIGHT: 149.6 LBS

## 2019-09-16 DIAGNOSIS — M46.1 SACROILIITIS (HCC): Primary | ICD-10-CM

## 2019-09-16 DIAGNOSIS — R10.32 BILATERAL GROIN PAIN: ICD-10-CM

## 2019-09-16 DIAGNOSIS — R10.31 BILATERAL GROIN PAIN: ICD-10-CM

## 2019-09-16 PROCEDURE — 99214 OFFICE O/P EST MOD 30 MIN: CPT | Performed by: NURSE PRACTITIONER

## 2019-09-16 RX ORDER — METHOCARBAMOL 750 MG/1
750 TABLET, FILM COATED ORAL
Qty: 30 TABLET | Refills: 0 | Status: SHIPPED | OUTPATIENT
Start: 2019-09-16 | End: 2020-10-08 | Stop reason: ALTCHOICE

## 2019-09-16 NOTE — PROGRESS NOTES
Assessment  1  Sacroiliitis ([facility] [address] )    2  Bilateral groin pain        Plan  Based on patient report and physical exam, the patient's symptomatology does seem to be consistent with sacroiliac mediated pain from sacroiliitis  We will schedule the patient for a bilateral SIJ injection to decrease any inflammatory components of the patient's pain symptoms  I will also initiate methocarbamol 750 mg 1 tablet at bedtime  Patient was educated on the most common side effects which are drowsiness and dizziness  Patient can continue to use Advil and Tylenol as needed  Follow up after procedure      [place] Prescription Drug Monitoring Program report was reviewed and was appropriate     Complete risks and benefits including bleeding, infection, tissue reaction, nerve injury and allergic reaction were discussed  The approach was demonstrated using models and literature was provided  Verbal and written consent was obtained  My impressions and treatment recommendations were discussed in detail with the patient who verbalized understanding and had no further questions  Discharge instructions were provided  I personally saw and examined the patient and I agree with the above discussed plan of care  Orders Placed This Encounter   Procedures    FL spine and pain procedure     Standing Status:   Future     Standing Expiration Date:   9/16/2023     Order Specific Question:   Reason for Exam:     Answer:   B/L SIJ injections     Order Specific Question:   Anticoagulant hold needed? Answer:   none     New Medications Ordered This Visit   Medications    methocarbamol (ROBAXIN) 750 mg tablet     Sig: Take 1 tablet (750 mg total) by mouth daily at bedtime as needed for muscle spasms     Dispense:  30 tablet     Refill:  0       History of Present Illness    Eligio Gaona is a 60 y o  female presents for follow-up related to her low back and abdominal pain    Patient is status post a bilateral transversus abdominis plane injection with ultrasound guidance on August 15, 2019 with Dr Elana Lucio  Unfortunately the patient did not receive any relief from this injection  Patient continues to experience pain rated 8/10 that is worsening this is constant and most bothersome in the morning  She describes the pain as cramping, and pressure-like  She localizes her pain across her low back over her PSIS regions with the right side being worse than the left  She is also been experiencing right-sided posterior thigh pain since this past weekend  The abdominal pain she tells me is more described as groin pain  Her pain is most bothersome when she tries to transition from a seated to standing position  Patient does use Advil which does give her short-term relief of her pain symptoms  I have personally reviewed and/or updated the patient's past medical history, past surgical history, family history, social history, current medications, allergies, and vital signs today  Review of Systems   Respiratory: Negative for shortness of breath  Cardiovascular: Negative for chest pain  Gastrointestinal: Negative for constipation, diarrhea, nausea and vomiting  Musculoskeletal: Positive for back pain (lumbar radiating down the right thigh), gait problem (B/L pelvic ) and joint swelling  Negative for arthralgias and myalgias  Skin: Negative for rash  Neurological: Negative for dizziness, seizures and weakness  All other systems reviewed and are negative        Patient Active Problem List   Diagnosis    Acute right-sided low back pain with right-sided sciatica    Hypertension    Spinal stenosis of lumbar region    Cough    Bilateral groin pain    Sacroiliitis (HCC)       Past Medical History:   Diagnosis Date    Chronic pain disorder     Hypertension     Low back pain        Past Surgical History:   Procedure Laterality Date    BREAST BIOPSY Right      SECTION      TONSILLECTOMY      TUBAL LIGATION Family History   Problem Relation Age of Onset    Congenital heart disease Mother     Heart attack Mother         atrial     Congenital heart disease Father     No Known Problems Sister     No Known Problems Daughter     No Known Problems Maternal Grandmother     No Known Problems Maternal Grandfather     No Known Problems Paternal Grandmother     No Known Problems Paternal Grandfather     No Known Problems Sister     No Known Problems Sister        Social History     Occupational History     Comment: full time employment     Occupation: clerical   Tobacco Use    Smoking status: Former Smoker     Packs/day: 0 10     Years: 10 00     Pack years: 1 00     Start date: 2008    Smokeless tobacco: Never Used   Substance and Sexual Activity    Alcohol use: Yes     Alcohol/week: 2 0 standard drinks     Types: 2 Cans of beer per week    Drug use: No    Sexual activity: Yes     Partners: Male     Birth control/protection: Female Sterilization       Current Outpatient Medications on File Prior to Visit   Medication Sig    naproxen (NAPROSYN) 250 mg tablet Take 250 mg by mouth 2 (two) times a day with meals    valsartan (DIOVAN) 160 mg tablet Take 1 tablet (160 mg total) by mouth daily    ibuprofen (MOTRIN) 100 mg/5 mL suspension Take 200 mg by mouth every 4 (four) hours as needed for mild pain     No current facility-administered medications on file prior to visit  Allergies   Allergen Reactions    Diphenhydramine     Gabapentin Eye Swelling and Rash       Physical Exam    /76   Pulse 72   Resp 14   Ht 5' 6" (1 676 m)   Wt 67 9 kg (149 lb 9 6 oz)   BMI 24 15 kg/m²     Constitutional: normal, well developed, well nourished, alert, in no distress and non-toxic and no overt pain behavior    Eyes: anicteric  HEENT: grossly intact  Neck: supple, symmetric, trachea midline and no masses   Pulmonary:even and unlabored  Cardiovascular:No edema or pitting edema present  Skin:Normal without rashes or lesions and well hydrated  Psychiatric:Mood and affect appropriate  Neurologic:Cranial Nerves II-XII grossly intact  Musculoskeletal:normal   Lumbar Spine Exam    Appearance:  Normal lordosis  Palpation/Tenderness:  left sacroiliac joint tenderness  right sacroiliac joint tenderness R>L      Range of Motion:  Flexion:  No limitation  with pain  Extension:  No limitation  without pain  Lateral Flexion - Left:  No limitation  without pain  Lateral Flexion - Right:  No limitation  without pain  Rotation - Left:  No limitation  without pain  Rotation - Right:  No limitation  without pain  Motor Strength:  Left hip flexion:  5/5  Left hip extension:  5/5  Right hip flexion:  5/5  Right hip extension:  5/5  Left knee flexion:  5/5  Left knee extension:  5/5  Right knee flexion:  5/5  Right knee extension:  5/5  Left foot dorsiflexion:  5/5  Left foot plantar flexion:  5/5  Right foot dorsiflexion:  5/5  Right foot plantar flexion:  5/5    Special Tests:  Left Straight Leg Test:  negative  Right Straight Leg Test:  negative  Left Adriel's Maneuver:  positive  Right Adriel's Maneuver:  positive  Left Gaenslen's Test:  positive  Right Gaenslen's Test:  positive  Left Pelvic Distraction Test:  positive  Right Pelvic Distraction Test:  positive      Imaging

## 2019-10-03 ENCOUNTER — HOSPITAL ENCOUNTER (EMERGENCY)
Facility: HOSPITAL | Age: 64
Discharge: HOME | End: 2019-10-03
Attending: EMERGENCY MEDICINE
Payer: COMMERCIAL

## 2019-10-03 VITALS
DIASTOLIC BLOOD PRESSURE: 100 MMHG | HEART RATE: 83 BPM | SYSTOLIC BLOOD PRESSURE: 156 MMHG | TEMPERATURE: 98.6 F | OXYGEN SATURATION: 99 % | BODY MASS INDEX: 24.16 KG/M2 | RESPIRATION RATE: 16 BRPM | WEIGHT: 149.69 LBS

## 2019-10-03 DIAGNOSIS — G89.29 CHRONIC PELVIC PAIN IN FEMALE: Primary | ICD-10-CM

## 2019-10-03 DIAGNOSIS — R10.2 CHRONIC PELVIC PAIN IN FEMALE: Primary | ICD-10-CM

## 2019-10-03 DIAGNOSIS — R10.2 PELVIC PAIN: ICD-10-CM

## 2019-10-03 PROCEDURE — 99284 EMERGENCY DEPT VISIT MOD MDM: CPT | Performed by: EMERGENCY MEDICINE

## 2019-10-03 PROCEDURE — 99283 EMERGENCY DEPT VISIT LOW MDM: CPT

## 2019-10-03 NOTE — DISCHARGE INSTRUCTIONS
Follow up tomorrow for your injection  Speak with your family doctor, your pain has been ongoing for a year  It may be reasonable to be evaluated with an outpatient MRI, this is at the discretion of your family doctor

## 2019-10-03 NOTE — ED PROVIDER NOTES
History  Chief Complaint   Patient presents with    Pelvic Pain     Reports pelvic and back pain x 1 year  Feels like a "twisting" sensation in groin  Has been seen for this by Dr Ceasar Frankel and had multiple scans for this  Also sees pain management  No bleeding   Back Pain     61 YO female presents with lower back and B/L groin  Pt states this has been present for the last year  States initially this was primarily in the Right back and radiating to the Right side  She notes that she has now developed Left sided low back pain which is also constant  Pt states she has had and U/S and a CT of the abdomen, these did not show the exact cause for the pain  She states she did follow up with OB as she was found to have uterine fibroids  She follows up with pain management and does get injections, she has an appointment tomorrow with pain management  States the pain in the Right has been more pronounced on the Right for the last 2 weeks and has been radiating around to the front  Pt denies fevers, no changes in urination, no nausea or vomiting  She states groin pain is worse in the morning and with standing from a sitting position  Pt denies CP/SOB/F/C/N/V/D/C, no dysuria, burning on urination or blood in urine  History provided by:  Patient   used: No    Pelvic Pain   Location:  B/L groin  Quality:  Aching  Severity:  Moderate  Onset quality:  Unable to specify  Duration: 1 year  Timing:  Constant  Progression:  Worsening  Chronicity:  Chronic  Relieved by:  Nothing  Worsened by:   Movement  Ineffective treatments:  Robaxin, injections  Associated symptoms: no abdominal pain, no chest pain, no cough, no diarrhea, no fatigue, no fever, no rash, no shortness of breath and no vomiting    Back Pain   Associated symptoms: pelvic pain    Associated symptoms: no abdominal pain, no chest pain, no dysuria, no fever and no weakness        Prior to Admission Medications   Prescriptions Last Dose Informant Patient Reported? Taking?   ibuprofen (MOTRIN) 100 mg/5 mL suspension   Yes No   Sig: Take 200 mg by mouth every 4 (four) hours as needed for mild pain   methocarbamol (ROBAXIN) 750 mg tablet   No No   Sig: Take 1 tablet (750 mg total) by mouth daily at bedtime as needed for muscle spasms   naproxen (NAPROSYN) 250 mg tablet   Yes No   Sig: Take 250 mg by mouth 2 (two) times a day with meals   valsartan (DIOVAN) 160 mg tablet   No No   Sig: Take 1 tablet (160 mg total) by mouth daily      Facility-Administered Medications: None       Past Medical History:   Diagnosis Date    Chronic pain disorder     Hypertension     Low back pain        Past Surgical History:   Procedure Laterality Date    BREAST BIOPSY Right      SECTION      TONSILLECTOMY      TUBAL LIGATION         Family History   Problem Relation Age of Onset    Congenital heart disease Mother     Heart attack Mother         atrial     Congenital heart disease Father     No Known Problems Sister     No Known Problems Daughter     No Known Problems Maternal Grandmother     No Known Problems Maternal Grandfather     No Known Problems Paternal Grandmother     No Known Problems Paternal Grandfather     No Known Problems Sister     No Known Problems Sister      I have reviewed and agree with the history as documented  Social History     Tobacco Use    Smoking status: Former Smoker     Packs/day: 0 10     Years: 10 00     Pack years: 1 00     Start date:     Smokeless tobacco: Never Used   Substance Use Topics    Alcohol use: Yes     Alcohol/week: 2 0 standard drinks     Types: 2 Cans of beer per week     Comment: social    Drug use: No        Review of Systems   Constitutional: Negative for chills, fatigue and fever  HENT: Negative for dental problem  Eyes: Negative for visual disturbance  Respiratory: Negative for cough and shortness of breath  Cardiovascular: Negative for chest pain     Gastrointestinal: Negative for abdominal pain, diarrhea and vomiting  Genitourinary: Positive for pelvic pain  Negative for dysuria and frequency  Musculoskeletal: Positive for back pain  Negative for arthralgias  Skin: Negative for rash  Neurological: Negative for dizziness, weakness and light-headedness  Psychiatric/Behavioral: Negative for agitation, behavioral problems and confusion  All other systems reviewed and are negative  Physical Exam  Physical Exam   Constitutional: She is oriented to person, place, and time  She appears well-developed and well-nourished  HENT:   Head: Normocephalic and atraumatic  Eyes: EOM are normal    Neck: Normal range of motion  Cardiovascular: Normal rate, regular rhythm and normal heart sounds  Pulmonary/Chest: Effort normal and breath sounds normal    Abdominal: Soft  Musculoskeletal: Normal range of motion  Neurological: She is alert and oriented to person, place, and time  Skin: Skin is warm and dry  Psychiatric: She has a normal mood and affect  Her behavior is normal  Thought content normal    Nursing note and vitals reviewed        Vital Signs  ED Triage Vitals [10/03/19 0951]   Temperature Pulse Respirations Blood Pressure SpO2   98 6 °F (37 °C) 83 16 156/100 99 %      Temp Source Heart Rate Source Patient Position - Orthostatic VS BP Location FiO2 (%)   Temporal -- -- -- --      Pain Score       Worst Possible Pain           Vitals:    10/03/19 0951   BP: 156/100   Pulse: 83         Visual Acuity  Visual Acuity      Most Recent Value   L Pupil Size (mm)  3   R Pupil Size (mm)  3          ED Medications  Medications - No data to display    Diagnostic Studies  Results Reviewed     None                 No orders to display              Procedures  Procedures       ED Course                               MDM  Number of Diagnoses or Management Options  Chronic pelvic pain in female: new and requires workup  Pelvic pain: new and requires workup  Diagnosis management comments: 1  Groin pain - Pt with ongoing pain for the last year, unknown etiology  Did discuss ordering x-ray of the pelvis to assess potential arthritis, however this is of low utility as it may not show up on x-ray despite being present and, if it is present, may not be the cause of pain  Have recommended following back up with PCP and pain management to further evaluate this pain, possible MRI as this may give the clearest picture, especially since CT for this pain did not show a cause  Pt states she will follow up for this evaluation  Amount and/or Complexity of Data Reviewed  Obtain history from someone other than the patient: yes  Review and summarize past medical records: yes    Patient Progress  Patient progress: stable      Disposition  Final diagnoses:   Chronic pelvic pain in female   Pelvic pain     Time reflects when diagnosis was documented in both MDM as applicable and the Disposition within this note     Time User Action Codes Description Comment    10/3/2019 10:53 AM Kelly Dave [R10 2,  G89 29] Chronic pelvic pain in female     10/3/2019 10:53 AM Kelly Dave [R10 2] Pelvic pain       ED Disposition     ED Disposition Condition Date/Time Comment    Discharge Stable Thu Oct 3, 2019 10:52 AM Sakina Barnes discharge to home/self care              Follow-up Information     Follow up With Specialties Details Why Contact Info    Luis Holden MD Family Medicine Schedule an appointment as soon as possible for a visit   [address] [place] [address] Drive  [phone]            Discharge Medication List as of 10/3/2019 10:54 AM      CONTINUE these medications which have NOT CHANGED    Details   ibuprofen (MOTRIN) 100 mg/5 mL suspension Take 200 mg by mouth every 4 (four) hours as needed for mild pain, Historical Med      methocarbamol (ROBAXIN) 750 mg tablet Take 1 tablet (750 mg total) by mouth daily at bedtime as needed for muscle spasms, Starting Mon 9/16/2019, Normal      naproxen (NAPROSYN) 250 mg tablet Take 250 mg by mouth 2 (two) times a day with meals, Historical Med      valsartan (DIOVAN) 160 mg tablet Take 1 tablet (160 mg total) by mouth daily, Starting Fri 6/14/2019, Normal           No discharge procedures on file      ED Provider  Electronically Signed by           Fredi Serrano MD  10/03/19 7626

## 2019-10-04 ENCOUNTER — HOSPITAL ENCOUNTER (OUTPATIENT)
Dept: RADIOLOGY | Facility: MEDICAL CENTER | Age: 64
Discharge: HOME | End: 2019-10-04
Attending: PHYSICAL MEDICINE & REHABILITATION | Admitting: PHYSICAL MEDICINE & REHABILITATION
Payer: COMMERCIAL

## 2019-10-04 VITALS
DIASTOLIC BLOOD PRESSURE: 95 MMHG | HEART RATE: 75 BPM | RESPIRATION RATE: 14 BRPM | OXYGEN SATURATION: 97 % | TEMPERATURE: 98.4 F | SYSTOLIC BLOOD PRESSURE: 134 MMHG

## 2019-10-04 DIAGNOSIS — R10.31 BILATERAL GROIN PAIN: ICD-10-CM

## 2019-10-04 DIAGNOSIS — M46.1 SACROILIITIS (HCC): ICD-10-CM

## 2019-10-04 DIAGNOSIS — R10.32 BILATERAL GROIN PAIN: ICD-10-CM

## 2019-10-04 PROCEDURE — 27096 INJECT SACROILIAC JOINT: CPT | Performed by: PHYSICAL MEDICINE & REHABILITATION

## 2019-10-04 RX ORDER — METHYLPREDNISOLONE ACETATE 40 MG/ML
80 INJECTION, SUSPENSION INTRA-ARTICULAR; INTRALESIONAL; INTRAMUSCULAR; PARENTERAL; SOFT TISSUE ONCE
Status: COMPLETED | OUTPATIENT
Start: 2019-10-04 | End: 2019-10-04

## 2019-10-04 RX ORDER — BUPIVACAINE HCL/PF 2.5 MG/ML
10 VIAL (ML) INJECTION ONCE
Status: COMPLETED | OUTPATIENT
Start: 2019-10-04 | End: 2019-10-04

## 2019-10-04 RX ADMIN — IOHEXOL 1 ML: 300 INJECTION, SOLUTION INTRAVENOUS at 10:28

## 2019-10-04 RX ADMIN — Medication 3 ML: at 10:28

## 2019-10-04 RX ADMIN — METHYLPREDNISOLONE ACETATE 80 MG: 40 INJECTION, SUSPENSION INTRA-ARTICULAR; INTRALESIONAL; INTRAMUSCULAR; SOFT TISSUE at 10:28

## 2019-10-04 NOTE — DISCHARGE INSTRUCTIONS
Steroid Joint Injection   WHAT YOU NEED TO KNOW:   A steroid joint injection is a procedure to inject steroid medicine into a joint  Steroid medicine decreases pain and inflammation  The injection may also contain an anesthetic (numbing medicine) to decrease pain  It may be done to treat conditions such as arthritis, gout, or carpal tunnel syndrome  The injections may be given in your knee, ankle, shoulder, elbow, wrist, ankle or sacroiliac joint  1  Do not apply heat to any area that is numb  If you have discomfort or soreness at the injection site, you may apply ice today, 20 minutes on and 20 minutes off  Tomorrow you may use ice or warm, moist heat  Do not apply ice or heat directly to the skin  2  You may have an increase or change in the discomfort for 36-48 hours after your treatment  Apply ice and continue with any pain medicine you have been prescribed  3  Do not do anything strenuous today  You may shower, but no tub baths or hot tubs today  You may resume your normal activities tomorrow, but do not overdo it  Resume normal activities slowly when you are feeling better  4  If you experience redness, drainage or swelling at the injection site, or if you develop a fever above 100 degrees, please call [facility] at [phone] or go to the Emergency Room  5  Continue to take all routine medicines prescribed by your primary care physician unless otherwise instructed by our staff  Most blood thinners should be started again according to your regularly scheduled dosing  If you have any questions, please give our office a call  If you have a problem specifically related to your procedure, please call our office at [phone]  Problems not related to your procedure should be directed to your primary care physician

## 2019-10-04 NOTE — H&P
History of Present Illness: The patient is a 60 y o  female who presents with complaints of bilateral lower back pain    Patient Active Problem List   Diagnosis    Acute right-sided low back pain with right-sided sciatica    Hypertension    Spinal stenosis of lumbar region    Cough    Bilateral groin pain    Sacroiliitis (HCC)       Past Medical History:   Diagnosis Date    Chronic pain disorder     Hypertension     Low back pain        Past Surgical History:   Procedure Laterality Date    BREAST BIOPSY Right      SECTION      TONSILLECTOMY      TUBAL LIGATION           Current Outpatient Medications:     ibuprofen (MOTRIN) 100 mg/5 mL suspension, Take 200 mg by mouth every 4 (four) hours as needed for mild pain, Disp: , Rfl:     methocarbamol (ROBAXIN) 750 mg tablet, Take 1 tablet (750 mg total) by mouth daily at bedtime as needed for muscle spasms, Disp: 30 tablet, Rfl: 0    naproxen (NAPROSYN) 250 mg tablet, Take 250 mg by mouth 2 (two) times a day with meals, Disp: , Rfl:     valsartan (DIOVAN) 160 mg tablet, Take 1 tablet (160 mg total) by mouth daily, Disp: 30 tablet, Rfl: 5    Current Facility-Administered Medications:     bupivacaine (PF) (MARCAINE) 0 25 % injection 10 mL, 10 mL, Intra-articular, Once, Sherlon Host, DO    iohexol (OMNIPAQUE) 300 mg/mL injection 50 mL, 50 mL, Intra-articular, Once, Sherlon Host, DO    methylPREDNISolone acetate (DEPO-MEDROL) injection 80 mg, 80 mg, Intra-articular, Once, Sherlon Host, DO    Allergies   Allergen Reactions    Diphenhydramine     Gabapentin Eye Swelling and Rash       Physical Exam:   Vitals:    10/04/19 1013   BP: 123/84   Pulse: 79   Resp: 16   Temp: 98 4 °F (36 9 °C)   SpO2: 98%     General: Awake, Alert, Oriented x 3, Mood and affect appropriate  Respiratory: Respirations even and unlabored  Cardiovascular: Peripheral pulses intact; no edema  Musculoskeletal Exam:  Bilateral lower back pain    ASA Score:  2  Patient/Chart Verification  Patient ID Verified: Verbal  Consents Confirmed: Procedural, To be obtained in the Pre-Procedure area  H&P( within 30 days) Verified: To be obtained in the Pre-Procedure area  Allergies Reviewed: Yes  Anticoag/NSAID held?: NA  Currently on antibiotics?: No  Pregnancy denied?: Yes    Assessment:   1  Sacroiliitis ([facility] [address] )    2   Bilateral groin pain        Plan: B/L SIJ injections

## 2019-10-11 ENCOUNTER — TELEPHONE (OUTPATIENT)
Dept: PAIN MEDICINE | Facility: CLINIC | Age: 64
End: 2019-10-11

## 2019-10-11 ENCOUNTER — TELEPHONE (OUTPATIENT)
Dept: FAMILY MEDICINE CLINIC | Facility: CLINIC | Age: 64
End: 2019-10-11

## 2019-10-11 NOTE — TELEPHONE ENCOUNTER
A little relief but not much  25% relief, she still cannot sleep at night with pelvic pain  Pain level: 8

## 2019-10-12 DIAGNOSIS — R10.32 BILATERAL GROIN PAIN: ICD-10-CM

## 2019-10-12 DIAGNOSIS — M46.1 SACROILIITIS (HCC): ICD-10-CM

## 2019-10-12 DIAGNOSIS — R10.31 BILATERAL GROIN PAIN: ICD-10-CM

## 2019-10-14 RX ORDER — METHOCARBAMOL 750 MG/1
750 TABLET, FILM COATED ORAL
Qty: 30 TABLET | Refills: 0 | OUTPATIENT
Start: 2019-10-14

## 2019-10-27 DIAGNOSIS — M46.1 SACROILIITIS (HCC): ICD-10-CM

## 2019-10-27 DIAGNOSIS — R10.32 BILATERAL GROIN PAIN: ICD-10-CM

## 2019-10-27 DIAGNOSIS — R10.31 BILATERAL GROIN PAIN: ICD-10-CM

## 2019-10-28 RX ORDER — METHOCARBAMOL 750 MG/1
750 TABLET, FILM COATED ORAL
Qty: 30 TABLET | Refills: 0 | OUTPATIENT
Start: 2019-10-28

## 2019-11-13 ENCOUNTER — OFFICE VISIT (OUTPATIENT)
Dept: PAIN MEDICINE | Facility: MEDICAL CENTER | Age: 64
End: 2019-11-13
Payer: COMMERCIAL

## 2019-11-13 VITALS
HEIGHT: 66 IN | WEIGHT: 151.8 LBS | SYSTOLIC BLOOD PRESSURE: 126 MMHG | BODY MASS INDEX: 24.4 KG/M2 | DIASTOLIC BLOOD PRESSURE: 78 MMHG | RESPIRATION RATE: 14 BRPM | HEART RATE: 72 BPM

## 2019-11-13 DIAGNOSIS — R10.32 BILATERAL GROIN PAIN: ICD-10-CM

## 2019-11-13 DIAGNOSIS — R10.31 BILATERAL GROIN PAIN: ICD-10-CM

## 2019-11-13 DIAGNOSIS — M46.1 SACROILIITIS (HCC): Primary | ICD-10-CM

## 2019-11-13 PROCEDURE — 99214 OFFICE O/P EST MOD 30 MIN: CPT | Performed by: NURSE PRACTITIONER

## 2019-11-13 RX ORDER — DULOXETIN HYDROCHLORIDE 30 MG/1
30 CAPSULE, DELAYED RELEASE ORAL DAILY
Qty: 30 CAPSULE | Refills: 0 | Status: SHIPPED | OUTPATIENT
Start: 2019-11-13 | End: 2020-12-28 | Stop reason: ALTCHOICE

## 2019-11-13 RX ORDER — TRAMADOL HYDROCHLORIDE 50 MG/1
50 TABLET ORAL
COMMUNITY
End: 2020-10-08 | Stop reason: ALTCHOICE

## 2019-11-13 NOTE — PROGRESS NOTES
Assessment  1  Sacroiliitis ([facility] [address] )    2  Bilateral groin pain        Plan  Based on patient report and physical exam, the patient's symptomatology does seem to be consistent with sacroiliac mediated pain from sacroiliitis  We will schedule the patient for a bilateral SIJ injection to decrease any inflammatory components of the patient's pain symptoms  We will wait 3 months time from the previous injection which will be on or after January 4, 2020  Initiate duloxetine 30 mg 1 tablet daily for her pain symptoms  She was educated on the most common side effects which are nausea and dizziness  Continue with Advil and Tylenol  Follow up after procedure as needed      [place] Prescription Drug Monitoring Program report was reviewed and was appropriate     Complete risks and benefits including bleeding, infection, tissue reaction, nerve injury and allergic reaction were discussed  The approach was demonstrated using models and literature was provided  Verbal and written consent was obtained  My impressions and treatment recommendations were discussed in detail with the patient who verbalized understanding and had no further questions  Discharge instructions were provided  I personally saw and examined the patient and I agree with the above discussed plan of care  Orders Placed This Encounter   Procedures    FL spine and pain procedure     Standing Status:   Future     Standing Expiration Date:   11/13/2023     Order Specific Question:   Reason for Exam:     Answer:   B/L SIJ injections on or after 1-4-20     Order Specific Question:   Anticoagulant hold needed?      Answer:   none     New Medications Ordered This Visit   Medications    traMADol (ULTRAM) 50 mg tablet     Sig: Take 50 mg by mouth daily at bedtime    DULoxetine (CYMBALTA) 30 mg delayed release capsule     Sig: Take 1 capsule (30 mg total) by mouth daily     Dispense:  30 capsule     Refill:  0       History of Present Illness    Sj QURESHI Norman Amaral is a 60 y o  female presents for follow-up related to her bilateral sacroiliac joint pain and bilateral groin pain  Today she rates her pain 7/10 this is constant most bothersome at night  She describes the pain as dull, aching, and pressure-like  Patient is status post bilateral sacroiliac joint injection on 2019 with Dr Mariah Bernal  She tells me that yes she did get some relief for a little while from these injections  Patient does not really take the methocarbamol she does not think it helps her much in it makes her little bit tired in the morning  She is using Advil and Tylenol  She recently began tramadol from her shoulder surgeon  Patient will be having a right rotator cuff repair on 2019 at [facility]  I have personally reviewed and/or updated the patient's past medical history, past surgical history, family history, social history, current medications, allergies, and vital signs today  Review of Systems   Respiratory: Negative for shortness of breath  Cardiovascular: Negative for chest pain  Gastrointestinal: Negative for constipation, diarrhea, nausea and vomiting  Musculoskeletal: Positive for back pain (hips and back), gait problem, joint swelling and myalgias  Negative for arthralgias  Skin: Negative for rash  Neurological: Negative for dizziness, seizures and weakness  All other systems reviewed and are negative        Patient Active Problem List   Diagnosis    Acute right-sided low back pain with right-sided sciatica    Hypertension    Spinal stenosis of lumbar region    Cough    Bilateral groin pain    Sacroiliitis (HCC)       Past Medical History:   Diagnosis Date    Chronic pain disorder     Disorder of rotator cuff, right     Hypertension     Low back pain        Past Surgical History:   Procedure Laterality Date    BREAST BIOPSY Right      SECTION      TONSILLECTOMY      TUBAL LIGATION         Family History Problem Relation Age of Onset    Congenital heart disease Mother     Heart attack Mother         atrial     Congenital heart disease Father     No Known Problems Sister     No Known Problems Daughter     No Known Problems Maternal Grandmother     No Known Problems Maternal Grandfather     No Known Problems Paternal Grandmother     No Known Problems Paternal Grandfather     No Known Problems Sister     No Known Problems Sister        Social History     Occupational History     Comment: full time employment     Occupation: clerical   Tobacco Use    Smoking status: Former Smoker     Packs/day: 0 10     Years: 10 00     Pack years: 1 00     Start date: 2008    Smokeless tobacco: Never Used   Substance and Sexual Activity    Alcohol use: Yes     Alcohol/week: 2 0 standard drinks     Types: 2 Cans of beer per week     Comment: social    Drug use: No    Sexual activity: Yes     Partners: Male     Birth control/protection: Female Sterilization       Current Outpatient Medications on File Prior to Visit   Medication Sig    naproxen (NAPROSYN) 250 mg tablet Take 250 mg by mouth 2 (two) times a day with meals    traMADol (ULTRAM) 50 mg tablet Take 50 mg by mouth daily at bedtime    valsartan (DIOVAN) 160 mg tablet Take 1 tablet (160 mg total) by mouth daily    methocarbamol (ROBAXIN) 750 mg tablet Take 1 tablet (750 mg total) by mouth daily at bedtime as needed for muscle spasms (Patient not taking: Reported on 11/13/2019)    [DISCONTINUED] ibuprofen (MOTRIN) 100 mg/5 mL suspension Take 200 mg by mouth every 4 (four) hours as needed for mild pain     No current facility-administered medications on file prior to visit          Allergies   Allergen Reactions    Diphenhydramine     Gabapentin Eye Swelling and Rash       Physical Exam    /78   Pulse 72   Resp 14   Ht 5' 6" (1 676 m)   Wt 68 9 kg (151 lb 12 8 oz)   BMI 24 50 kg/m²     Constitutional: normal, well developed, well nourished, alert, in no distress and non-toxic and no overt pain behavior    Eyes: anicteric  HEENT: grossly intact  Neck: supple, symmetric, trachea midline and no masses   Pulmonary:even and unlabored  Cardiovascular:No edema or pitting edema present  Skin:Normal without rashes or lesions and well hydrated  Psychiatric:Mood and affect appropriate  Neurologic:Cranial Nerves II-XII grossly intact  Musculoskeletal:normal   Lumbar Spine Exam    Appearance:  Normal lordosis  Palpation/Tenderness:  left sacroiliac joint tenderness  right sacroiliac joint tenderness    Range of Motion:  Full range of motion with no pain or limitations in flexion, extension, lateral flexion and rotation  Motor Strength:  Left hip flexion:  5/5  Left hip extension:  5/5  Right hip flexion:  5/5  Right hip extension:  5/5  Left knee flexion:  5/5  Left knee extension:  5/5  Right knee flexion:  5/5  Right knee extension:  5/5  Left foot dorsiflexion:  5/5  Left foot plantar flexion:  5/5  Right foot dorsiflexion:  5/5  Right foot plantar flexion:  5/5    Special Tests:  Left Adriel's Maneuver:  positive  Right Adriel's Maneuver:  positive  Left Gaenslen's Test:  positive  Right Gaenslen's Test:  positive  Left Pelvic Distraction Test:  positive  Right Pelvic Distraction Test:  positive  Left Piriformis Stretch Test:  negative  Right Piriformis Stretch Test:  negative     No pain with internal and external rotation of bilateral hips      Imaging

## 2019-11-19 DIAGNOSIS — Z23 NEED FOR INFLUENZA VACCINATION: Primary | ICD-10-CM

## 2019-11-26 ENCOUNTER — APPOINTMENT (OUTPATIENT)
Dept: URGENT CARE | Facility: CLINIC | Age: 64
End: 2019-11-26

## 2019-11-26 VITALS
WEIGHT: 151.6 LBS | DIASTOLIC BLOOD PRESSURE: 90 MMHG | BODY MASS INDEX: 24.36 KG/M2 | SYSTOLIC BLOOD PRESSURE: 144 MMHG | HEIGHT: 66 IN

## 2019-11-26 DIAGNOSIS — Z00.8 ENCOUNTER FOR BIOMETRIC SCREENING: Primary | ICD-10-CM

## 2019-11-26 PROCEDURE — 80323 ALKALOIDS NOS: CPT | Performed by: NURSE PRACTITIONER

## 2019-11-26 PROCEDURE — G0480 DRUG TEST DEF 1-7 CLASSES: HCPCS | Performed by: NURSE PRACTITIONER

## 2019-12-02 DIAGNOSIS — I10 ESSENTIAL HYPERTENSION: ICD-10-CM

## 2019-12-02 RX ORDER — VALSARTAN 160 MG/1
TABLET ORAL
Qty: 90 TABLET | Refills: 1 | OUTPATIENT
Start: 2019-12-02

## 2019-12-03 DIAGNOSIS — I10 ESSENTIAL HYPERTENSION: ICD-10-CM

## 2019-12-03 LAB
COTININE SERPL-MCNC: NORMAL NG/ML
NICOTINE SERPL-MCNC: NORMAL NG/ML

## 2019-12-03 RX ORDER — VALSARTAN 160 MG/1
160 TABLET ORAL DAILY
Qty: 14 TABLET | Refills: 0 | Status: SHIPPED | OUTPATIENT
Start: 2019-12-03 | End: 2019-12-20 | Stop reason: SDUPTHER

## 2019-12-08 DIAGNOSIS — M46.1 SACROILIITIS (HCC): ICD-10-CM

## 2019-12-09 RX ORDER — DULOXETIN HYDROCHLORIDE 30 MG/1
CAPSULE, DELAYED RELEASE ORAL
Qty: 30 CAPSULE | Refills: 0 | OUTPATIENT
Start: 2019-12-09

## 2019-12-20 ENCOUNTER — OFFICE VISIT (OUTPATIENT)
Dept: FAMILY MEDICINE CLINIC | Facility: CLINIC | Age: 64
End: 2019-12-20
Payer: COMMERCIAL

## 2019-12-20 VITALS
HEART RATE: 84 BPM | BODY MASS INDEX: 24.27 KG/M2 | DIASTOLIC BLOOD PRESSURE: 64 MMHG | WEIGHT: 151 LBS | TEMPERATURE: 98.1 F | SYSTOLIC BLOOD PRESSURE: 126 MMHG | HEIGHT: 66 IN

## 2019-12-20 DIAGNOSIS — M12.811 ROTATOR CUFF ARTHROPATHY OF RIGHT SHOULDER: Primary | ICD-10-CM

## 2019-12-20 DIAGNOSIS — I10 ESSENTIAL HYPERTENSION: ICD-10-CM

## 2019-12-20 PROBLEM — R05.9 COUGH: Status: RESOLVED | Noted: 2018-10-05 | Resolved: 2019-12-20

## 2019-12-20 PROCEDURE — 99213 OFFICE O/P EST LOW 20 MIN: CPT | Performed by: FAMILY MEDICINE

## 2019-12-20 RX ORDER — VALSARTAN 160 MG/1
160 TABLET ORAL DAILY
Qty: 90 TABLET | Refills: 1 | Status: SHIPPED | OUTPATIENT
Start: 2019-12-20 | End: 2020-06-09

## 2019-12-20 NOTE — PROGRESS NOTES
Assessment and Plan:    Problem List Items Addressed This Visit        Cardiovascular and Mediastinum    Hypertension     BP stable         Relevant Medications    valsartan (DIOVAN) 160 mg tablet    Other Relevant Orders    Comprehensive metabolic panel    TSH, 3rd generation    CBC and differential    Lipid panel    UA (URINE) with reflex to Scope       Musculoskeletal and Integument    Rotator cuff arthropathy of right shoulder - Primary     S/p surgery                      Diagnoses and all orders for this visit:    Rotator cuff arthropathy of right shoulder    Essential hypertension  -     valsartan (DIOVAN) 160 mg tablet; Take 1 tablet (160 mg total) by mouth daily for 14 days  -     Comprehensive metabolic panel; Future  -     TSH, 3rd generation; Future  -     CBC and differential; Future  -     Lipid panel; Future  -     UA (URINE) with reflex to Scope; Future              Subjective:      Patient ID: Lindy Starr is a 60 y o  female  CC:    Chief Complaint   Patient presents with    Medication Refill       HPI:    Hypertension   This is a chronic problem  The current episode started more than 1 year ago  The problem is unchanged  The problem is controlled  Pertinent negatives include no blurred vision, chest pain, headaches, palpitations, peripheral edema or shortness of breath  There are no associated agents to hypertension  Risk factors for coronary artery disease include post-menopausal state  Past treatments include angiotensin blockers  The current treatment provides significant improvement  There are no compliance problems  The following portions of the patient's history were reviewed and updated as appropriate: allergies, current medications, past family history, past medical history, past social history, past surgical history and problem list       Review of Systems   Constitutional: Negative for activity change, appetite change and unexpected weight change     Eyes: Negative for blurred vision  Respiratory: Negative for shortness of breath  Cardiovascular: Negative for chest pain and palpitations  Gastrointestinal: Positive for abdominal pain  Neurological: Negative for headaches  Psychiatric/Behavioral: The patient is not nervous/anxious  Data to review:       Objective:    Vitals:    12/20/19 1057   BP: 126/64   BP Location: Left arm   Patient Position: Sitting   Cuff Size: Adult   Pulse: 84   Temp: 98 1 °F (36 7 °C)   TempSrc: Tympanic   Weight: 68 5 kg (151 lb)   Height: 5' 6" (1 676 m)        Physical Exam   Constitutional: She appears well-developed and well-nourished  Neck: No thyromegaly present  Cardiovascular: Normal rate, regular rhythm and normal heart sounds  Pulmonary/Chest: Effort normal and breath sounds normal    Musculoskeletal: She exhibits no edema  Lymphadenopathy:     She has no cervical adenopathy  Vitals reviewed

## 2020-01-07 ENCOUNTER — APPOINTMENT (OUTPATIENT)
Dept: LAB | Facility: HOSPITAL | Age: 65
End: 2020-01-07
Payer: COMMERCIAL

## 2020-01-07 DIAGNOSIS — I10 ESSENTIAL HYPERTENSION: ICD-10-CM

## 2020-01-07 LAB
ALBUMIN SERPL BCP-MCNC: 3.6 G/DL (ref 3.5–5)
ALP SERPL-CCNC: 93 U/L (ref 46–116)
ALT SERPL W P-5'-P-CCNC: 19 U/L (ref 12–78)
ANION GAP SERPL CALCULATED.3IONS-SCNC: 8 MMOL/L (ref 4–13)
AST SERPL W P-5'-P-CCNC: 10 U/L (ref 5–45)
BACTERIA UR QL AUTO: ABNORMAL /HPF
BASOPHILS # BLD AUTO: 0.03 THOUSANDS/ΜL (ref 0–0.1)
BASOPHILS NFR BLD AUTO: 1 % (ref 0–1)
BILIRUB SERPL-MCNC: 0.55 MG/DL (ref 0.2–1)
BILIRUB UR QL STRIP: NEGATIVE
BUN SERPL-MCNC: 13 MG/DL (ref 5–25)
CALCIUM SERPL-MCNC: 9 MG/DL (ref 8.3–10.1)
CHLORIDE SERPL-SCNC: 104 MMOL/L (ref 100–108)
CHOLEST SERPL-MCNC: 209 MG/DL (ref 50–200)
CLARITY UR: CLEAR
CO2 SERPL-SCNC: 29 MMOL/L (ref 21–32)
COLOR UR: YELLOW
CREAT SERPL-MCNC: 0.61 MG/DL (ref 0.6–1.3)
EOSINOPHIL # BLD AUTO: 0.08 THOUSAND/ΜL (ref 0–0.61)
EOSINOPHIL NFR BLD AUTO: 1 % (ref 0–6)
ERYTHROCYTE [DISTWIDTH] IN BLOOD BY AUTOMATED COUNT: 12.7 % (ref 11.6–15.1)
GFR SERPL CREATININE-BSD FRML MDRD: 97 ML/MIN/1.73SQ M
GLUCOSE P FAST SERPL-MCNC: 110 MG/DL (ref 65–99)
GLUCOSE UR STRIP-MCNC: NEGATIVE MG/DL
HCT VFR BLD AUTO: 40.9 % (ref 34.8–46.1)
HDLC SERPL-MCNC: 91 MG/DL
HGB BLD-MCNC: 12.8 G/DL (ref 11.5–15.4)
HGB UR QL STRIP.AUTO: NEGATIVE
IMM GRANULOCYTES # BLD AUTO: 0.01 THOUSAND/UL (ref 0–0.2)
IMM GRANULOCYTES NFR BLD AUTO: 0 % (ref 0–2)
KETONES UR STRIP-MCNC: ABNORMAL MG/DL
LDLC SERPL CALC-MCNC: 102 MG/DL (ref 0–100)
LEUKOCYTE ESTERASE UR QL STRIP: ABNORMAL
LYMPHOCYTES # BLD AUTO: 1.31 THOUSANDS/ΜL (ref 0.6–4.47)
LYMPHOCYTES NFR BLD AUTO: 21 % (ref 14–44)
MCH RBC QN AUTO: 30.6 PG (ref 26.8–34.3)
MCHC RBC AUTO-ENTMCNC: 31.3 G/DL (ref 31.4–37.4)
MCV RBC AUTO: 98 FL (ref 82–98)
MONOCYTES # BLD AUTO: 0.41 THOUSAND/ΜL (ref 0.17–1.22)
MONOCYTES NFR BLD AUTO: 7 % (ref 4–12)
NEUTROPHILS # BLD AUTO: 4.39 THOUSANDS/ΜL (ref 1.85–7.62)
NEUTS SEG NFR BLD AUTO: 70 % (ref 43–75)
NITRITE UR QL STRIP: NEGATIVE
NON-SQ EPI CELLS URNS QL MICRO: ABNORMAL /HPF
NONHDLC SERPL-MCNC: 118 MG/DL
NRBC BLD AUTO-RTO: 0 /100 WBCS
PH UR STRIP.AUTO: 6 [PH]
PLATELET # BLD AUTO: 262 THOUSANDS/UL (ref 149–390)
PMV BLD AUTO: 8.8 FL (ref 8.9–12.7)
POTASSIUM SERPL-SCNC: 4.1 MMOL/L (ref 3.5–5.3)
PROT SERPL-MCNC: 7.5 G/DL (ref 6.4–8.2)
PROT UR STRIP-MCNC: NEGATIVE MG/DL
RBC # BLD AUTO: 4.18 MILLION/UL (ref 3.81–5.12)
RBC #/AREA URNS AUTO: ABNORMAL /HPF
SODIUM SERPL-SCNC: 141 MMOL/L (ref 136–145)
SP GR UR STRIP.AUTO: >=1.03 (ref 1–1.03)
TRIGL SERPL-MCNC: 79 MG/DL
TSH SERPL DL<=0.05 MIU/L-ACNC: 1.2 UIU/ML (ref 0.36–3.74)
UROBILINOGEN UR QL STRIP.AUTO: 0.2 E.U./DL
WBC # BLD AUTO: 6.23 THOUSAND/UL (ref 4.31–10.16)
WBC #/AREA URNS AUTO: ABNORMAL /HPF

## 2020-01-07 PROCEDURE — 80061 LIPID PANEL: CPT

## 2020-01-07 PROCEDURE — 85025 COMPLETE CBC W/AUTO DIFF WBC: CPT

## 2020-01-07 PROCEDURE — 81001 URINALYSIS AUTO W/SCOPE: CPT

## 2020-01-07 PROCEDURE — 36415 COLL VENOUS BLD VENIPUNCTURE: CPT

## 2020-01-07 PROCEDURE — 80053 COMPREHEN METABOLIC PANEL: CPT

## 2020-01-07 PROCEDURE — 84443 ASSAY THYROID STIM HORMONE: CPT

## 2020-01-13 ENCOUNTER — TELEPHONE (OUTPATIENT)
Dept: PAIN MEDICINE | Facility: MEDICAL CENTER | Age: 65
End: 2020-01-13

## 2020-01-13 NOTE — TELEPHONE ENCOUNTER
Pt left a voicemail today requesting to cx her 1/22 procedure with WAYLON  Pt can be reached at [phone]

## 2020-02-04 ENCOUNTER — HOSPITAL ENCOUNTER (OUTPATIENT)
Dept: MAMMOGRAPHY | Facility: CLINIC | Age: 65
Discharge: HOME | End: 2020-02-04
Payer: COMMERCIAL

## 2020-02-04 DIAGNOSIS — R92.8 ABNORMAL MAMMOGRAM: ICD-10-CM

## 2020-02-04 PROCEDURE — 76642 ULTRASOUND BREAST LIMITED: CPT

## 2020-02-12 ENCOUNTER — TELEPHONE (OUTPATIENT)
Dept: FAMILY MEDICINE CLINIC | Facility: CLINIC | Age: 65
End: 2020-02-12

## 2020-02-12 DIAGNOSIS — R05.9 COUGH: Primary | ICD-10-CM

## 2020-02-12 RX ORDER — GUAIFENESIN AND CODEINE PHOSPHATE 100; 10 MG/5ML; MG/5ML
5 SOLUTION ORAL 3 TIMES DAILY PRN
Qty: 120 ML | Refills: 0 | Status: SHIPPED | OUTPATIENT
Start: 2020-02-12 | End: 2020-04-23 | Stop reason: CLARIF

## 2020-04-23 ENCOUNTER — TELEMEDICINE (OUTPATIENT)
Dept: FAMILY MEDICINE CLINIC | Facility: CLINIC | Age: 65
End: 2020-04-23
Payer: COMMERCIAL

## 2020-04-23 DIAGNOSIS — Z12.11 SCREENING FOR COLON CANCER: ICD-10-CM

## 2020-04-23 DIAGNOSIS — M79.10 MYALGIA: ICD-10-CM

## 2020-04-23 DIAGNOSIS — R73.02 IMPAIRED GLUCOSE TOLERANCE: Primary | ICD-10-CM

## 2020-04-23 DIAGNOSIS — I10 ESSENTIAL HYPERTENSION: ICD-10-CM

## 2020-04-23 PROCEDURE — 99441 PR PHYS/QHP TELEPHONE EVALUATION 5-10 MIN: CPT | Performed by: FAMILY MEDICINE

## 2020-04-24 ENCOUNTER — APPOINTMENT (OUTPATIENT)
Dept: LAB | Facility: HOSPITAL | Age: 65
End: 2020-04-24
Payer: COMMERCIAL

## 2020-04-24 DIAGNOSIS — R73.02 IMPAIRED GLUCOSE TOLERANCE: ICD-10-CM

## 2020-04-24 DIAGNOSIS — M79.10 MYALGIA: ICD-10-CM

## 2020-04-24 LAB
ALBUMIN SERPL BCP-MCNC: 3.7 G/DL (ref 3.5–5)
ALP SERPL-CCNC: 79 U/L (ref 46–116)
ALT SERPL W P-5'-P-CCNC: 25 U/L (ref 12–78)
ANION GAP SERPL CALCULATED.3IONS-SCNC: 8 MMOL/L (ref 4–13)
AST SERPL W P-5'-P-CCNC: 12 U/L (ref 5–45)
BILIRUB SERPL-MCNC: 0.63 MG/DL (ref 0.2–1)
BUN SERPL-MCNC: 18 MG/DL (ref 5–25)
CALCIUM SERPL-MCNC: 9.5 MG/DL (ref 8.3–10.1)
CHLORIDE SERPL-SCNC: 103 MMOL/L (ref 100–108)
CK SERPL-CCNC: 30 U/L (ref 26–192)
CO2 SERPL-SCNC: 27 MMOL/L (ref 21–32)
CREAT SERPL-MCNC: 0.81 MG/DL (ref 0.6–1.3)
CRP SERPL QL: 7.4 MG/L
ERYTHROCYTE [SEDIMENTATION RATE] IN BLOOD: 20 MM/HOUR (ref 1–20)
EST. AVERAGE GLUCOSE BLD GHB EST-MCNC: 111 MG/DL
GFR SERPL CREATININE-BSD FRML MDRD: 78 ML/MIN/1.73SQ M
GLUCOSE SERPL-MCNC: 100 MG/DL (ref 65–140)
HBA1C MFR BLD: 5.5 %
POTASSIUM SERPL-SCNC: 4 MMOL/L (ref 3.5–5.3)
PROT SERPL-MCNC: 7.6 G/DL (ref 6.4–8.2)
SODIUM SERPL-SCNC: 138 MMOL/L (ref 136–145)

## 2020-04-24 PROCEDURE — 86140 C-REACTIVE PROTEIN: CPT

## 2020-04-24 PROCEDURE — 82550 ASSAY OF CK (CPK): CPT | Performed by: FAMILY MEDICINE

## 2020-04-24 PROCEDURE — 83036 HEMOGLOBIN GLYCOSYLATED A1C: CPT

## 2020-04-24 PROCEDURE — 36415 COLL VENOUS BLD VENIPUNCTURE: CPT

## 2020-04-24 PROCEDURE — 85652 RBC SED RATE AUTOMATED: CPT

## 2020-04-24 PROCEDURE — 80053 COMPREHEN METABOLIC PANEL: CPT

## 2020-04-25 DIAGNOSIS — M79.10 MYALGIA: Primary | ICD-10-CM

## 2020-06-09 DIAGNOSIS — I10 ESSENTIAL HYPERTENSION: ICD-10-CM

## 2020-06-09 RX ORDER — VALSARTAN 160 MG/1
160 TABLET ORAL DAILY
Qty: 90 TABLET | Refills: 1 | Status: SHIPPED | OUTPATIENT
Start: 2020-06-09 | End: 2020-06-12 | Stop reason: CLARIF

## 2020-06-12 DIAGNOSIS — I10 ESSENTIAL HYPERTENSION: Primary | ICD-10-CM

## 2020-06-12 DIAGNOSIS — I10 ESSENTIAL HYPERTENSION: ICD-10-CM

## 2020-06-12 RX ORDER — OLMESARTAN MEDOXOMIL 20 MG/1
20 TABLET ORAL DAILY
Qty: 30 TABLET | Refills: 5 | Status: SHIPPED | OUTPATIENT
Start: 2020-06-12 | End: 2020-08-25 | Stop reason: SDUPTHER

## 2020-06-12 RX ORDER — VALSARTAN 160 MG/1
TABLET ORAL
Qty: 90 TABLET | Refills: 1 | OUTPATIENT
Start: 2020-06-12

## 2020-08-25 ENCOUNTER — OFFICE VISIT (OUTPATIENT)
Dept: FAMILY MEDICINE CLINIC | Facility: CLINIC | Age: 65
End: 2020-08-25
Payer: COMMERCIAL

## 2020-08-25 VITALS
BODY MASS INDEX: 24.59 KG/M2 | SYSTOLIC BLOOD PRESSURE: 128 MMHG | HEIGHT: 66 IN | TEMPERATURE: 98 F | DIASTOLIC BLOOD PRESSURE: 82 MMHG | HEART RATE: 64 BPM | RESPIRATION RATE: 16 BRPM | WEIGHT: 153 LBS

## 2020-08-25 DIAGNOSIS — F51.01 PRIMARY INSOMNIA: Primary | ICD-10-CM

## 2020-08-25 DIAGNOSIS — Z12.11 SCREENING FOR COLON CANCER: ICD-10-CM

## 2020-08-25 DIAGNOSIS — I10 ESSENTIAL HYPERTENSION: ICD-10-CM

## 2020-08-25 PROCEDURE — 1036F TOBACCO NON-USER: CPT | Performed by: FAMILY MEDICINE

## 2020-08-25 PROCEDURE — 3079F DIAST BP 80-89 MM HG: CPT | Performed by: FAMILY MEDICINE

## 2020-08-25 PROCEDURE — 99213 OFFICE O/P EST LOW 20 MIN: CPT | Performed by: FAMILY MEDICINE

## 2020-08-25 PROCEDURE — 3008F BODY MASS INDEX DOCD: CPT | Performed by: FAMILY MEDICINE

## 2020-08-25 PROCEDURE — 3074F SYST BP LT 130 MM HG: CPT | Performed by: FAMILY MEDICINE

## 2020-08-25 RX ORDER — ZOLPIDEM TARTRATE 5 MG/1
5 TABLET ORAL
Qty: 30 TABLET | Refills: 2 | Status: SHIPPED | OUTPATIENT
Start: 2020-08-25 | End: 2020-11-25

## 2020-08-25 RX ORDER — OLMESARTAN MEDOXOMIL 20 MG/1
20 TABLET ORAL DAILY
Qty: 30 TABLET | Refills: 5 | Status: SHIPPED | OUTPATIENT
Start: 2020-08-25 | End: 2020-10-08 | Stop reason: SDUPTHER

## 2020-08-25 NOTE — PROGRESS NOTES
Assessment and Plan:    Problem List Items Addressed This Visit     None                 There are no diagnoses linked to this encounter  Subjective:      Patient ID: Vaishali Leach is a 63 y o  female  CC:    Chief Complaint   Patient presents with    Follow-up     Patient present today for her 4 month follow up  HPI:    Here for f/u BP check, occ dizzy spells      The following portions of the patient's history were reviewed and updated as appropriate: allergies, current medications, past family history, past medical history, past social history, past surgical history and problem list         Review of Systems   Constitutional: Negative for activity change, appetite change, chills, fatigue and fever  Respiratory: Negative for shortness of breath  Cardiovascular: Negative for chest pain  Neurological: Positive for dizziness  Negative for light-headedness and headaches  Psychiatric/Behavioral: Positive for sleep disturbance  The patient is not nervous/anxious  Data to review:       Objective:    Vitals:    08/25/20 1630   BP: 128/82   BP Location: Left arm   Patient Position: Sitting   Cuff Size: Standard   Pulse: 64   Resp: 16   Temp: 98 °F (36 7 °C)   TempSrc: Temporal   Weight: 69 4 kg (153 lb)   Height: 5' 6" (1 676 m)        Physical Exam  Vitals signs reviewed  Constitutional:       Appearance: Normal appearance  She is normal weight  Neck:      Vascular: No carotid bruit  Cardiovascular:      Rate and Rhythm: Normal rate and regular rhythm  Pulses: Normal pulses  Heart sounds: Normal heart sounds  Pulmonary:      Effort: Pulmonary effort is normal       Breath sounds: Normal breath sounds  Musculoskeletal:      Right lower leg: No edema  Left lower leg: No edema  Lymphadenopathy:      Cervical: No cervical adenopathy  Neurological:      Mental Status: She is alert     Psychiatric:         Mood and Affect: Mood normal

## 2020-10-06 ENCOUNTER — APPOINTMENT (OUTPATIENT)
Dept: URGENT CARE | Facility: CLINIC | Age: 65
End: 2020-10-06

## 2020-10-06 DIAGNOSIS — Z23 NEED FOR IMMUNIZATION AGAINST INFLUENZA: Primary | ICD-10-CM

## 2020-10-08 ENCOUNTER — OFFICE VISIT (OUTPATIENT)
Dept: FAMILY MEDICINE CLINIC | Facility: CLINIC | Age: 65
End: 2020-10-08
Payer: COMMERCIAL

## 2020-10-08 VITALS
BODY MASS INDEX: 25.33 KG/M2 | DIASTOLIC BLOOD PRESSURE: 90 MMHG | WEIGHT: 152 LBS | HEIGHT: 65 IN | SYSTOLIC BLOOD PRESSURE: 136 MMHG | HEART RATE: 76 BPM | TEMPERATURE: 97.5 F

## 2020-10-08 DIAGNOSIS — I10 ESSENTIAL HYPERTENSION: ICD-10-CM

## 2020-10-08 DIAGNOSIS — Z12.11 COLON CANCER SCREENING: Primary | ICD-10-CM

## 2020-10-08 PROBLEM — R42 DIZZINESS: Status: ACTIVE | Noted: 2020-10-08

## 2020-10-08 PROBLEM — R42 DIZZINESS: Status: RESOLVED | Noted: 2020-10-08 | Resolved: 2020-10-08

## 2020-10-08 PROCEDURE — 99214 OFFICE O/P EST MOD 30 MIN: CPT | Performed by: PHYSICIAN ASSISTANT

## 2020-10-08 PROCEDURE — 3080F DIAST BP >= 90 MM HG: CPT | Performed by: PHYSICIAN ASSISTANT

## 2020-10-08 PROCEDURE — 1036F TOBACCO NON-USER: CPT | Performed by: PHYSICIAN ASSISTANT

## 2020-10-08 RX ORDER — OLMESARTAN MEDOXOMIL 40 MG/1
40 TABLET ORAL DAILY
Qty: 90 TABLET | Refills: 1 | Status: SHIPPED | OUTPATIENT
Start: 2020-10-08 | End: 2021-02-22

## 2020-10-14 ENCOUNTER — LAB (OUTPATIENT)
Dept: LAB | Facility: HOSPITAL | Age: 65
End: 2020-10-14
Payer: COMMERCIAL

## 2020-10-14 DIAGNOSIS — M79.10 MYALGIA: ICD-10-CM

## 2020-10-14 LAB
CRP SERPL QL: <3 MG/L
ERYTHROCYTE [SEDIMENTATION RATE] IN BLOOD: 4 MM/HOUR (ref 0–29)

## 2020-10-14 PROCEDURE — 86038 ANTINUCLEAR ANTIBODIES: CPT

## 2020-10-14 PROCEDURE — 36415 COLL VENOUS BLD VENIPUNCTURE: CPT

## 2020-10-14 PROCEDURE — 86140 C-REACTIVE PROTEIN: CPT

## 2020-10-14 PROCEDURE — 86431 RHEUMATOID FACTOR QUANT: CPT

## 2020-10-14 PROCEDURE — 86430 RHEUMATOID FACTOR TEST QUAL: CPT

## 2020-10-14 PROCEDURE — 85652 RBC SED RATE AUTOMATED: CPT

## 2020-10-15 DIAGNOSIS — M05.80 POLYARTHRITIS WITH POSITIVE RHEUMATOID FACTOR (HCC): Primary | ICD-10-CM

## 2020-10-15 LAB
CRYOGLOB RF SER-ACNC: ABNORMAL [IU]/ML
RHEUMATOID FACT SER QL LA: POSITIVE

## 2020-10-16 LAB — RYE IGE QN: NEGATIVE

## 2020-11-05 DIAGNOSIS — R19.5 POSITIVE COLORECTAL CANCER SCREENING USING COLOGUARD TEST: Primary | ICD-10-CM

## 2020-11-16 ENCOUNTER — CONSULT (OUTPATIENT)
Dept: SURGERY | Facility: CLINIC | Age: 65
End: 2020-11-16
Payer: COMMERCIAL

## 2020-11-16 VITALS
DIASTOLIC BLOOD PRESSURE: 82 MMHG | WEIGHT: 150 LBS | HEIGHT: 65 IN | SYSTOLIC BLOOD PRESSURE: 128 MMHG | HEART RATE: 82 BPM | BODY MASS INDEX: 24.99 KG/M2 | TEMPERATURE: 97.3 F

## 2020-11-16 DIAGNOSIS — R19.5 POSITIVE COLORECTAL CANCER SCREENING USING COLOGUARD TEST: ICD-10-CM

## 2020-11-16 DIAGNOSIS — Z86.010 HISTORY OF COLON POLYPS: Primary | ICD-10-CM

## 2020-11-16 PROCEDURE — 99213 OFFICE O/P EST LOW 20 MIN: CPT | Performed by: PHYSICIAN ASSISTANT

## 2020-11-16 PROCEDURE — 3008F BODY MASS INDEX DOCD: CPT | Performed by: PHYSICIAN ASSISTANT

## 2020-11-16 PROCEDURE — 3074F SYST BP LT 130 MM HG: CPT | Performed by: PHYSICIAN ASSISTANT

## 2020-11-16 PROCEDURE — 3079F DIAST BP 80-89 MM HG: CPT | Performed by: PHYSICIAN ASSISTANT

## 2020-11-16 PROCEDURE — 1036F TOBACCO NON-USER: CPT | Performed by: PHYSICIAN ASSISTANT

## 2020-11-25 DIAGNOSIS — F51.01 PRIMARY INSOMNIA: ICD-10-CM

## 2020-11-25 RX ORDER — ZOLPIDEM TARTRATE 5 MG/1
TABLET ORAL
Qty: 30 TABLET | Refills: 2 | Status: SHIPPED | OUTPATIENT
Start: 2020-11-25 | End: 2020-12-23 | Stop reason: DRUGHIGH

## 2020-12-22 ENCOUNTER — OFFICE VISIT (OUTPATIENT)
Dept: SURGERY | Facility: CLINIC | Age: 65
End: 2020-12-22

## 2020-12-22 VITALS
BODY MASS INDEX: 25.19 KG/M2 | DIASTOLIC BLOOD PRESSURE: 70 MMHG | HEART RATE: 81 BPM | HEIGHT: 65 IN | WEIGHT: 151.2 LBS | TEMPERATURE: 97.1 F | SYSTOLIC BLOOD PRESSURE: 130 MMHG

## 2020-12-22 DIAGNOSIS — Z86.010 ENCOUNTER FOR COLONOSCOPY DUE TO HISTORY OF ADENOMATOUS COLONIC POLYPS: Primary | ICD-10-CM

## 2020-12-22 DIAGNOSIS — Z12.11 ENCOUNTER FOR COLONOSCOPY DUE TO HISTORY OF ADENOMATOUS COLONIC POLYPS: Primary | ICD-10-CM

## 2020-12-22 PROCEDURE — PREOP: Performed by: PHYSICIAN ASSISTANT

## 2020-12-22 RX ORDER — SODIUM, POTASSIUM,MAG SULFATES 17.5-3.13G
1 SOLUTION, RECONSTITUTED, ORAL ORAL ONCE
Qty: 2 BOTTLE | Refills: 0 | Status: SHIPPED | OUTPATIENT
Start: 2020-12-22 | End: 2021-04-02 | Stop reason: CLARIF

## 2020-12-22 NOTE — H&P (VIEW-ONLY)
Assessment/Plan:   Maureen Pina is a 63 y  o female who is here for a:     Diagnostic  Colonoscopy  Plan: Colonoscopy for: Personal history of polyps, positive FIT test        Previous Colonoscopy and  Location of polyps if any:   6 years ago and with polyps in the :   transverse colon  , splenic flexure  and sigmoid colon           Preoperative Clearance: None        ______________________________________________________    HPI:  Maureen Pina is a 63 y  o female who was referred for evaluation of    Diagnostic   Currently:     Symptoms include:  no abdominal pain, change in bowel habits, or black or bloody stools  Family history of colon cancer: None reported             Anticoagulation: none    LABS:      Lab Results   Component Value Date    WBC 6 23 01/07/2020    HGB 12 8 01/07/2020    HCT 40 9 01/07/2020    MCV 98 01/07/2020     01/07/2020     Lab Results   Component Value Date    K 4 0 04/24/2020     04/24/2020    CO2 27 04/24/2020    BUN 18 04/24/2020    CREATININE 0 81 04/24/2020    GLUF 110 (H) 01/07/2020    CALCIUM 9 5 04/24/2020    AST 12 04/24/2020    ALT 25 04/24/2020    ALKPHOS 79 04/24/2020    EGFR 78 04/24/2020     Lab Results   Component Value Date    HGBA1C 5 5 04/24/2020     No results found for: INR, PROTIME      No orders to display           ROS:  General ROS: negative for - chills, fatigue, fever or night sweats, weight loss  Respiratory ROS: no cough, shortness of breath, or wheezing  Cardiovascular ROS: no chest pain or dyspnea on exertion  Genito-Urinary ROS: no dysuria, trouble voiding, or hematuria  Musculoskeletal ROS: negative for - gait disturbance, joint pain or muscle pain  Neurological ROS: no TIA or stroke symptoms  GI ROS: see HPI  Skin ROS: no new rashes or lesions   Lymphatic ROS: no new adenopathy noted by pt     GYN ROS: see HPI, no new GYN history or bleeding noted  Psy ROS: no new mental or behavioral disturbances           Imaging: No new pertinent imaging studies           Patient Active Problem List   Diagnosis    Acute right-sided low back pain with right-sided sciatica    Hypertension    Spinal stenosis of lumbar region    Bilateral groin pain    Sacroiliitis (HCC)    Rotator cuff arthropathy of right shoulder    Positive colorectal cancer screening using Cologuard test         Allergies:  Diphenhydramine and Gabapentin      Current Outpatient Medications:     naproxen (NAPROSYN) 250 mg tablet, Take 250 mg by mouth 2 (two) times a day with meals, Disp: , Rfl:     olmesartan (BENICAR) 40 mg tablet, Take 1 tablet (40 mg total) by mouth daily, Disp: 90 tablet, Rfl: 1    zolpidem (AMBIEN) 5 mg tablet, TAKE 1 TABLET BY MOUTH DAILY AT BEDTIME AS NEEDED FOR SLEEP, Disp: 30 tablet, Rfl: 2    DULoxetine (CYMBALTA) 30 mg delayed release capsule, Take 1 capsule (30 mg total) by mouth daily (Patient not taking: Reported on 2020), Disp: 30 capsule, Rfl: 0    Na Sulfate-K Sulfate-Mg Sulf (Suprep Bowel Prep Kit) 17 5-3 13-1 6 GM/177ML SOLN, Take 1 kit by mouth once for 1 dose, Disp: 2 Bottle, Rfl: 0    Past Medical History:   Diagnosis Date    Chronic pain disorder     Disorder of rotator cuff, right     Hypertension     Low back pain        Past Surgical History:   Procedure Laterality Date    BREAST BIOPSY Right      SECTION      TONSILLECTOMY      TUBAL LIGATION         Family History   Problem Relation Age of Onset    Congenital heart disease Mother     Heart attack Mother         atrial     Congenital heart disease Father     No Known Problems Sister     No Known Problems Daughter     No Known Problems Maternal Grandmother     No Known Problems Maternal Grandfather     No Known Problems Paternal Grandmother     No Known Problems Paternal Grandfather     No Known Problems Sister     No Known Problems Sister        Social History     Socioeconomic History    Marital status: /Civil Union     Spouse name: None    Number of children: 2    Years of education: None    Highest education level: None   Occupational History     Comment: full time employment     Occupation: clerical   Social Needs    Financial resource strain: None    Food insecurity     Worry: None     Inability: None    Transportation needs     Medical: None     Non-medical: None   Tobacco Use    Smoking status: Former Smoker     Packs/day: 0 10     Years: 10 00     Pack years: 1 00     Start date: 2008    Smokeless tobacco: Never Used   Substance and Sexual Activity    Alcohol use:  Yes     Alcohol/week: 2 0 standard drinks     Types: 2 Cans of beer per week     Comment: social    Drug use: No    Sexual activity: Yes     Partners: Male     Birth control/protection: Female Sterilization   Lifestyle    Physical activity     Days per week: None     Minutes per session: None    Stress: None   Relationships    Social connections     Talks on phone: None     Gets together: None     Attends Restoration service: None     Active member of club or organization: None     Attends meetings of clubs or organizations: None     Relationship status: None    Intimate partner violence     Fear of current or ex partner: None     Emotionally abused: None     Physically abused: None     Forced sexual activity: None   Other Topics Concern    None   Social History Narrative    Denied history of domestic violence    House    Lives with family    No advance directives    No living will    [facility]    Supportive and safe    3 grandchild, 2 children       Exam:   Vitals:    12/22/20 0802   BP: 130/70   Pulse: 81   Temp: (!) 97 1 °F (36 2 °C)           ______________________________________________________    PHYSICAL EXAM  General Appearance:    Alert, cooperative, no distress, healthy     Head:    Normocephalic without obvious abnormality   Eyes:    PERRL, conjunctiva/corneas clear, EOM's intact        Neck:   Supple, no adenopathy, no JVD   Back:     Symmetric, no spinal or CVA tenderness   Lungs:     Clear to auscultation bilaterally, no wheezing or rhonchi   Heart:    Regular rate and rhythm, S1 and S2 normal, no murmur   Abdomen:     Soft, NTND, +BS   Extremities:   Extremities normal  No clubbing, cyanosis or edema   Psych:   Normal Affect   Neurologic:   CNII-XII intact  Strength symmetric, speech intact                 Loretta Wilkinson PA-C  Date: 12/22/2020 Time: 8:17 AM       This report has been generated by a voice recognition software system  Therefore, there may be syntax, spelling, and/or grammatical errors  Please call if you've any questions  This  encounter has been billed by a non certified Coder  Informed consent for procedure was personally discussed, reviewed, and signed by Dr Karen Sidhu  Discussion by Dr Karen Sidhu was carried out regarding risks, benefits, and alternatives with the patient  Risks include but are not limited to:  bleeding, infection, and delayed wound healing or an open wound, pulmonary embolus, leaks from bowel or bile ducts or other viscus, transfusions, death  Discussed in further detail the more common complications and their rates of occurrence   was used if necessary  Patient expressed understanding of the issues discussed and wished/consented to proceed  All questions were answered by Dr Karen Sidhu  Discussion performed between patient and the provider signing below  Signature:   Daniella Mariscal  Date: 12/22/2020 Time: 8:17 AM                                                                                                                                        Informed consent for procedure was personally discussed, reviewed, and signed by Dr Karen Sidhu  Discussion by Dr Karen Sidhu was carried out regarding risks, benefits, and alternatives with the patient   Risks include but are not limited to:  bleeding, infection, and delayed wound healing or an open wound, pulmonary embolus, leaks from bowel or bile ducts or other viscus, transfusions, death  Discussed in further detail the more common complications and their rates of occurrence   was used if necessary  Patient expressed understanding of the issues discussed and wished/consented to proceed  All questions were answered by Dr Flash Saxena  Discussion performed between patient and the provider signing below  Signature:   Chantelleheather Rox    Date: 12/22/2020 Time: 8:17 AM           Some portions of this record may have been generated with voice recognition software  There may be translation, syntax,  or grammatical errors  Occasional wrong word or "sound-a-like" substitutions may have occurred due to the inherent limitations of the voice recognition software  Read the chart carefully and recognize, using context, where substitutions may have occurred  If you have any questions, please contact the dictating provider for clarification or correction, as needed  This encounter has been coded by a non-certified coder

## 2020-12-23 ENCOUNTER — TELEPHONE (OUTPATIENT)
Dept: FAMILY MEDICINE CLINIC | Facility: CLINIC | Age: 65
End: 2020-12-23

## 2020-12-23 DIAGNOSIS — F51.01 PRIMARY INSOMNIA: Primary | ICD-10-CM

## 2020-12-23 RX ORDER — ZOLPIDEM TARTRATE 10 MG/1
10 TABLET ORAL
Qty: 30 TABLET | Refills: 2 | Status: SHIPPED | OUTPATIENT
Start: 2020-12-23 | End: 2021-03-23

## 2020-12-28 ENCOUNTER — OFFICE VISIT (OUTPATIENT)
Dept: FAMILY MEDICINE CLINIC | Facility: CLINIC | Age: 65
End: 2020-12-28
Payer: COMMERCIAL

## 2020-12-28 VITALS
WEIGHT: 154 LBS | HEIGHT: 64 IN | BODY MASS INDEX: 26.29 KG/M2 | SYSTOLIC BLOOD PRESSURE: 140 MMHG | HEART RATE: 76 BPM | DIASTOLIC BLOOD PRESSURE: 84 MMHG | RESPIRATION RATE: 18 BRPM | TEMPERATURE: 97.3 F

## 2020-12-28 DIAGNOSIS — R10.9 ACUTE RIGHT FLANK PAIN: ICD-10-CM

## 2020-12-28 DIAGNOSIS — Z91.89 AT RISK FOR OSTEOPOROSIS: ICD-10-CM

## 2020-12-28 DIAGNOSIS — M53.3 PAIN IN THE COCCYX: Primary | ICD-10-CM

## 2020-12-28 LAB
SL AMB  POCT GLUCOSE, UA: NORMAL
SL AMB LEUKOCYTE ESTERASE,UA: NORMAL
SL AMB POCT BILIRUBIN,UA: NORMAL
SL AMB POCT BLOOD,UA: NORMAL
SL AMB POCT CLARITY,UA: CLEAR
SL AMB POCT COLOR,UA: YELLOW
SL AMB POCT KETONES,UA: NORMAL
SL AMB POCT NITRITE,UA: NORMAL
SL AMB POCT PH,UA: 6
SL AMB POCT SPECIFIC GRAVITY,UA: 1.02
SL AMB POCT URINE PROTEIN: NORMAL
SL AMB POCT UROBILINOGEN: 0.2

## 2020-12-28 PROCEDURE — 3008F BODY MASS INDEX DOCD: CPT | Performed by: PHYSICIAN ASSISTANT

## 2020-12-28 PROCEDURE — 99214 OFFICE O/P EST MOD 30 MIN: CPT | Performed by: NURSE PRACTITIONER

## 2020-12-28 PROCEDURE — 81002 URINALYSIS NONAUTO W/O SCOPE: CPT | Performed by: NURSE PRACTITIONER

## 2020-12-31 ENCOUNTER — TELEPHONE (OUTPATIENT)
Dept: SURGERY | Facility: CLINIC | Age: 65
End: 2020-12-31

## 2021-01-04 ENCOUNTER — ANESTHESIA EVENT (OUTPATIENT)
Dept: GASTROENTEROLOGY | Facility: HOSPITAL | Age: 66
End: 2021-01-04

## 2021-01-05 ENCOUNTER — ANESTHESIA (OUTPATIENT)
Dept: GASTROENTEROLOGY | Facility: HOSPITAL | Age: 66
End: 2021-01-05

## 2021-01-05 ENCOUNTER — HOSPITAL ENCOUNTER (OUTPATIENT)
Dept: GASTROENTEROLOGY | Facility: HOSPITAL | Age: 66
Setting detail: OUTPATIENT SURGERY
Discharge: HOME | End: 2021-01-05
Attending: SURGERY
Payer: COMMERCIAL

## 2021-01-05 VITALS
SYSTOLIC BLOOD PRESSURE: 129 MMHG | OXYGEN SATURATION: 99 % | BODY MASS INDEX: 25.27 KG/M2 | HEART RATE: 63 BPM | HEIGHT: 64 IN | RESPIRATION RATE: 11 BRPM | TEMPERATURE: 99 F | WEIGHT: 148 LBS | DIASTOLIC BLOOD PRESSURE: 63 MMHG

## 2021-01-05 VITALS — HEART RATE: 118 BPM

## 2021-01-05 DIAGNOSIS — Z86.010 HISTORY OF COLON POLYPS: ICD-10-CM

## 2021-01-05 PROCEDURE — 88305 TISSUE EXAM BY PATHOLOGIST: CPT | Performed by: PATHOLOGY

## 2021-01-05 PROCEDURE — 45385 COLONOSCOPY W/LESION REMOVAL: CPT | Performed by: SURGERY

## 2021-01-05 RX ORDER — PROPOFOL 10 MG/ML
INJECTION, EMULSION INTRAVENOUS AS NEEDED
Status: DISCONTINUED | OUTPATIENT
Start: 2021-01-05 | End: 2021-01-05

## 2021-01-05 RX ORDER — LIDOCAINE HYDROCHLORIDE 20 MG/ML
INJECTION, SOLUTION EPIDURAL; INFILTRATION; INTRACAUDAL; PERINEURAL AS NEEDED
Status: DISCONTINUED | OUTPATIENT
Start: 2021-01-05 | End: 2021-01-05

## 2021-01-05 RX ORDER — SODIUM CHLORIDE 9 MG/ML
125 INJECTION, SOLUTION INTRAVENOUS CONTINUOUS
Status: DISCONTINUED | OUTPATIENT
Start: 2021-01-05 | End: 2021-01-09 | Stop reason: HOSPADM

## 2021-01-05 RX ADMIN — PROPOFOL 20 MG: 10 INJECTION, EMULSION INTRAVENOUS at 10:18

## 2021-01-05 RX ADMIN — LIDOCAINE HYDROCHLORIDE 100 MG: 20 INJECTION, SOLUTION EPIDURAL; INFILTRATION; INTRACAUDAL; PERINEURAL at 10:01

## 2021-01-05 RX ADMIN — PROPOFOL 50 MG: 10 INJECTION, EMULSION INTRAVENOUS at 10:06

## 2021-01-05 RX ADMIN — PROPOFOL 30 MG: 10 INJECTION, EMULSION INTRAVENOUS at 10:16

## 2021-01-05 RX ADMIN — PROPOFOL 30 MG: 10 INJECTION, EMULSION INTRAVENOUS at 10:23

## 2021-01-05 RX ADMIN — SODIUM CHLORIDE 125 ML/HR: 0.9 INJECTION, SOLUTION INTRAVENOUS at 08:56

## 2021-01-05 RX ADMIN — PROPOFOL 50 MG: 10 INJECTION, EMULSION INTRAVENOUS at 10:10

## 2021-01-05 RX ADMIN — PROPOFOL 50 MG: 10 INJECTION, EMULSION INTRAVENOUS at 10:13

## 2021-01-05 RX ADMIN — PROPOFOL 100 MG: 10 INJECTION, EMULSION INTRAVENOUS at 10:01

## 2021-01-05 NOTE — ANESTHESIA POSTPROCEDURE EVALUATION
Post-Op Assessment Note    CV Status:  Stable    Pain management: adequate     Mental Status:  Alert and awake   Hydration Status:  Euvolemic   PONV Controlled:  Controlled   Airway Patency:  Patent      Post Op Vitals Reviewed: Yes      Staff: Anesthesiologist         No complications documented      /61 (01/05/21 1033)    Temp      Pulse 72 (01/05/21 1033)   Resp 22 (01/05/21 1033)    SpO2 99 % (01/05/21 1033)

## 2021-01-05 NOTE — DISCHARGE INSTRUCTIONS
[facility]  Endoscopy Post-Operative Instructions  Dr Aguila Cleveland MD, FACS    Procedure: Colonoscopy    Findings:  Diverticulosis and Colon Polyp(s)    Follow-Up: You will need a repeat Endoscopy in (generally)3 years  Will await final pathology report for final determination of number of years until your follow up endoscopy, if you had polyps on this exam   Different types of polyps require different lengths of follow up surveillance  Please call our office or your primary doctor's office if you have any questions, once the report is returned  You should have an endoscopy sooner than recommended if you have any symptoms of bleeding or change in stools or other concerns  You will receive a call from our office with your results, in addition to the the preliminary results you received today  You will usually receive a follow-up letter from our office in 1-2 weeks  Call the office if you do not hear from us  You are welcome to also schedule an office visit if desired to discuss the results further  It is your responsibility to contact our office for results in 1- 2 weeks if you do not hear from us  If a follow up endoscopy is needed, you are responsible for arranging that follow up appointment at the appropriate time  The office may or may not issue a reminder at that future time  Please take responsibility for your own follow up healthcare  Diet: Eat a light snack first, and then resume your previous diet  Call the office if you have unusual fevers, chills, nausea or vomiting or abdominal pain  Report to the emergency room with these are severe in nature  Activity: Do not drive a car, operate machinery, or sign legal documents for 24 hours after your procedure  Normal activity may be resumed on the day following the procedure       Call the office at [phone] for any of the following: Severe abdominal pain, significant rectal bleeding, chills, or fever above 100°, new onset of persistent cough or persistent vomiting  Selvin Erickson [address], [address]   Phone: [phone]            Colonoscopy   WHAT YOU NEED TO KNOW:   A colonoscopy is a procedure to examine the inside of your colon (intestine) with a scope  Polyps or tissue growths may have been removed during your colonoscopy  It is normal to feel bloated and to have some abdominal discomfort  You should be passing gas  If you have hemorrhoids or you had polyps removed, you may have a small amount of bleeding  DISCHARGE INSTRUCTIONS:   Call your doctor if:   · You have a large amount of bright red blood in your bowel movements  · Your abdomen is hard and firm and you have severe pain  · You have sudden trouble breathing  · You develop a rash or hives  · You have a fever within 24 hours of your procedure  · You have not had a bowel movement for 3 days after your procedure  · You have questions or concerns about your condition or care  After your colonoscopy:   · Do not lift, strain, or run  for 3 days  · Rest as much as possible  You have been given medicine to relax you  Do not  drive or make important decisions for at least 24 hours  Return to your normal activity as directed  · Relieve gas and discomfort from bloating  by lying on your left side with a heating pad on your abdomen  You may need to take short walks to help the gas move out  Eat small meals until bloating is relieved  If you had polyps removed: For 7 days after your procedure:  · Do not  take aspirin  · Do not  go on long car rides  Help prevent constipation:   · Eat a variety of healthy foods  Healthy foods include fruit, vegetables, whole-grain breads, low-fat dairy products, beans, lean meat, and fish  Ask if you need to be on a special diet  Your healthcare provider may recommend that you eat high-fiber foods such as cooked beans   Fiber helps you have regular bowel movements  · Drink liquids as directed  Adults should drink between 9 and 13 eight-ounce cups of liquid every day  Ask what amount is best for you  For most people, good liquids to drink are water, juice, and milk  · Exercise as directed  Talk to your healthcare provider about the best exercise plan for you  Exercise can help prevent constipation, decrease your blood pressure and improve your health  Follow up with your healthcare provider as directed:  Write down your questions so you remember to ask them during your visits  © Copyright [address] Information is for End User's use only and may not be sold, redistributed or otherwise used for commercial purposes  All illustrations and images included in CareNotes® are the copyrighted property of A D A M , Inc  or [address] [place], [place] [address]   The above information is an  only  It is not intended as medical advice for individual conditions or treatments  Talk to your doctor, nurse or pharmacist before following any medical regimen to see if it is safe and effective for you  Colonoscopy   WHAT YOU NEED TO KNOW:   A colonoscopy is a procedure to examine the inside of your colon (intestine) with a scope  Polyps or tissue growths may have been removed during your colonoscopy  It is normal to feel bloated and to have some abdominal discomfort  You should be passing gas  If you have hemorrhoids or you had polyps removed, you may have a small amount of bleeding  DISCHARGE INSTRUCTIONS:   Call your doctor if:   · You have a large amount of bright red blood in your bowel movements  · Your abdomen is hard and firm and you have severe pain  · You have sudden trouble breathing  · You develop a rash or hives  · You have a fever within 24 hours of your procedure  · You have not had a bowel movement for 3 days after your procedure      · You have questions or concerns about your condition or care     After your colonoscopy:   · Do not lift, strain, or run  for 3 days  · Rest as much as possible  You have been given medicine to relax you  Do not  drive or make important decisions for at least 24 hours  Return to your normal activity as directed  · Relieve gas and discomfort from bloating  by lying on your left side with a heating pad on your abdomen  You may need to take short walks to help the gas move out  Eat small meals until bloating is relieved  If you had polyps removed: For 7 days after your procedure:  · Do not  take aspirin  · Do not  go on long car rides  Help prevent constipation:   · Eat a variety of healthy foods  Healthy foods include fruit, vegetables, whole-grain breads, low-fat dairy products, beans, lean meat, and fish  Ask if you need to be on a special diet  Your healthcare provider may recommend that you eat high-fiber foods such as cooked beans  Fiber helps you have regular bowel movements  · Drink liquids as directed  Adults should drink between 9 and 13 eight-ounce cups of liquid every day  Ask what amount is best for you  For most people, good liquids to drink are water, juice, and milk  · Exercise as directed  Talk to your healthcare provider about the best exercise plan for you  Exercise can help prevent constipation, decrease your blood pressure and improve your health  Follow up with your healthcare provider as directed:  Write down your questions so you remember to ask them during your visits  © Copyright [address] Information is for End User's use only and may not be sold, redistributed or otherwise used for commercial purposes  All illustrations and images included in CareNotes® are the copyrighted property of A D A M , Inc  or [facility] Ken Madrigal   The above information is an  only  It is not intended as medical advice for individual conditions or treatments   Talk to your doctor, nurse or pharmacist before following any medical regimen to see if it is safe and effective for you

## 2021-01-05 NOTE — ANESTHESIA PREPROCEDURE EVALUATION
Procedure:  COLONOSCOPY    Relevant Problems   CARDIO   (+) Hypertension      MUSCULOSKELETAL   (+) Acute right-sided low back pain with right-sided sciatica   (+) Pain in the coccyx   (+) Sacroiliitis (HCC)        Physical Exam    Airway    Mallampati score: II  TM Distance: >3 FB  Neck ROM: full     Dental       Cardiovascular  Rhythm: regular, Rate: normal, Cardiovascular exam normal    Pulmonary  Pulmonary exam normal Breath sounds clear to auscultation,     Other Findings        Anesthesia Plan  ASA Score- 2     Anesthesia Type- general with ASA Monitors  Additional Monitors:   Airway Plan:     Comment: PONV    Plan Factors-    Chart reviewed  Induction- intravenous  Postoperative Plan-     Informed Consent- Anesthetic plan and risks discussed with patient

## 2021-01-05 NOTE — INTERVAL H&P NOTE
H&P reviewed  After examining the patient I find no changes in the patients condition since the H&P had been written      Vitals:    01/05/21 0853   BP: 125/78   Pulse: 105   Resp: 20   Temp: 99 °F (37 2 °C)   SpO2: 95%

## 2021-01-06 ENCOUNTER — TELEPHONE (OUTPATIENT)
Dept: SURGERY | Facility: CLINIC | Age: 66
End: 2021-01-06

## 2021-01-06 NOTE — TELEPHONE ENCOUNTER
S/P = Colonoscopy = 1/5/21    Called and spoke with patient  She denies any F/V/N/C and she has had a bowel movement  Patient is aware to call the office with any questions or concerns  She is aware she will be receiving a call once her pathology has been finalized and verified  Path pending

## 2021-01-07 NOTE — RESULT ENCOUNTER NOTE
Please call patient with results  Patient has at least 1 or more tubular adenomas on this path report  These are benign lesions but may have some predisposition to growth and development into malignancy  In general, 3, 5 or 7 year follow-up recommended  Please see my notes for details  I changed this to a 6 month follow-up on the note to ensure complete removal of the low-grade dysplasia

## 2021-01-13 ENCOUNTER — TELEPHONE (OUTPATIENT)
Dept: OBGYN CLINIC | Facility: MEDICAL CENTER | Age: 66
End: 2021-01-13

## 2021-01-13 NOTE — TELEPHONE ENCOUNTER
Pt called and left vm she would like a call back to schedule an appt  Called and left vm to schedule appt with pt

## 2021-01-22 ENCOUNTER — TELEPHONE (OUTPATIENT)
Dept: FAMILY MEDICINE CLINIC | Facility: CLINIC | Age: 66
End: 2021-01-22

## 2021-01-22 NOTE — TELEPHONE ENCOUNTER
Pt saw Glenn Saleem on 12/28 & she states that they had discussed her taking some sort of vitamin d3  Does Soto have any recommendations for her? Please advise - [phone]  Thank you!

## 2021-01-27 ENCOUNTER — ANNUAL EXAM (OUTPATIENT)
Dept: OBGYN CLINIC | Facility: MEDICAL CENTER | Age: 66
End: 2021-01-27
Payer: COMMERCIAL

## 2021-01-27 VITALS
HEIGHT: 64 IN | SYSTOLIC BLOOD PRESSURE: 146 MMHG | WEIGHT: 148 LBS | BODY MASS INDEX: 25.27 KG/M2 | DIASTOLIC BLOOD PRESSURE: 88 MMHG

## 2021-01-27 DIAGNOSIS — Z01.419 ENCNTR FOR GYN EXAM (GENERAL) (ROUTINE) W/O ABN FINDINGS: ICD-10-CM

## 2021-01-27 DIAGNOSIS — Z12.31 ENCOUNTER FOR SCREENING MAMMOGRAM FOR MALIGNANT NEOPLASM OF BREAST: Primary | ICD-10-CM

## 2021-01-27 PROCEDURE — S0612 ANNUAL GYNECOLOGICAL EXAMINA: HCPCS | Performed by: NURSE PRACTITIONER

## 2021-01-27 PROCEDURE — 3008F BODY MASS INDEX DOCD: CPT | Performed by: NURSE PRACTITIONER

## 2021-01-27 NOTE — PROGRESS NOTES
ASSESSMENT & PLAN: Marquise Cartwright is a 63 y o  [id number] with normal gynecologic exam     1   Routine well woman exam done today  2  Pap and HPV:  The patient's last pap and hpv was   It was normal     Pap with cotesting was not done today  Current ASCCP Guidelines reviewed  3   Mammogram ordered  4  Colorectal cancer screening was not ordered  5  The following were reviewed in today's visit: breast self exam, mammography screening ordered, adequate intake of calcium and vitamin D, exercise and healthy diet  6 rto one year  CC:  Annual Gynecologic Examination    HPI: Marquise Cartwright is a 63 y o  [id number] who presents for annual gynecologic examination  She has the following concerns: still has right sided pain  I sent her to pain management for same last year  Had 2 injections/w/o relief  Has appt with rheumatologist in may  Health Maintenance:    She wears her seatbelt routinely  She does perform regular monthly self breast exams  She feels safe at home  Pap:   Last mammogram:2020  Last colonoscopy:2021    Past Medical History:   Diagnosis Date    Chronic pain disorder     Colon polyp     Disorder of rotator cuff, right     Hypertension     Low back pain     PONV (postoperative nausea and vomiting)        Past Surgical History:   Procedure Laterality Date    BREAST BIOPSY Right      SECTION      COLONOSCOPY  2021    6 MONTH RECOMMENDED= LOW-GRADE DYSPLASIA    TONSILLECTOMY      TUBAL LIGATION         Past OB/Gyn History:  OB History        3    Para   2    Term   2            AB   1    Living   2       SAB        TAB        Ectopic        Multiple        Live Births   2               Pt does not have menstrual issues  History of sexually transmitted infection: No   History of abnormal pap smears: No      Patient is currently sexually active  heterosexual   The current method of family planning is tubal ligation      Family History Problem Relation Age of Onset    Congenital heart disease Mother     Heart attack Mother         atrial     Congenital heart disease Father     No Known Problems Sister     No Known Problems Daughter     No Known Problems Maternal Grandmother     No Known Problems Maternal Grandfather     No Known Problems Paternal Grandmother     No Known Problems Paternal Grandfather     No Known Problems Sister     No Known Problems Sister        Social History:  Social History     Socioeconomic History    Marital status: /Civil Union     Spouse name: Not on file    Number of children: 2    Years of education: Not on file    Highest education level: Not on file   Occupational History     Comment: full time employment     Occupation: clerical   Social Needs    Financial resource strain: Not on file    Food insecurity     Worry: Not on file     Inability: Not on file   [employer] needs     Medical: Not on file     Non-medical: Not on file   Tobacco Use    Smoking status: Former Smoker     Packs/day: 0 10     Years: 10 00     Pack years: 1 00     Start date: 2008    Smokeless tobacco: Never Used   Substance and Sexual Activity    Alcohol use:  Yes     Alcohol/week: 10 0 standard drinks     Types: 10 Cans of beer per week     Frequency: 4 or more times a week     Drinks per session: 3 or 4     Comment: social    Drug use: No    Sexual activity: Yes     Partners: Male     Birth control/protection: Female Sterilization   Lifestyle    Physical activity     Days per week: Not on file     Minutes per session: Not on file    Stress: Not on file   Relationships    Social connections     Talks on phone: Not on file     Gets together: Not on file     Attends [employer] service: Not on file     Active member of club or organization: Not on file     Attends meetings of clubs or organizations: Not on file     Relationship status: Not on file    Intimate partner violence     Fear of current or ex partner: Not on file     Emotionally abused: Not on file     Physically abused: Not on file     Forced sexual activity: Not on file   Other Topics Concern    Not on file   Social History Narrative    Denied history of domestic violence    House    Lives with family    No advance directives    No living will    Adventism    Supportive and safe    3 grandchild, 2 children     Presently lives with spouse  Patient is currently employed   Allergies   Allergen Reactions    Diphenhydramine Hyperactivity    Gabapentin Eye Swelling and Rash       Current Outpatient Medications:     olmesartan (BENICAR) 40 mg tablet, Take 1 tablet (40 mg total) by mouth daily, Disp: 90 tablet, Rfl: 1    zolpidem (AMBIEN) 10 mg tablet, Take 1 tablet (10 mg total) by mouth daily at bedtime as needed for sleep, Disp: 30 tablet, Rfl: 2    Na Sulfate-K Sulfate-Mg Sulf (Suprep Bowel Prep Kit) 17 5-3 13-1 6 GM/177ML SOLN, Take 1 kit by mouth once for 1 dose, Disp: 2 Bottle, Rfl: 0      Review of Systems  Constitutional :no fever, feels well, no tiredness, no recent weight gain or loss  ENT: no ear ache, no loss of hearing, no nosebleeds or nasal discharge, no sore throat or hoarseness  Cardiovascular: no complaints of slow or fast heart beat, no chest pain, no palpitations, no leg claudication or lower extremity edema  Respiratory: no complaints of shortness of shortness of breath, no ARDON  Breasts:no complaints of breast pain, breast lump, or nipple discharge  Gastrointestinal: no complaints of abdominal pain, constipation, nausea, vomiting, or diarrhea or bloody stools  Genitourinary : no complaints of dysuria, incontinence, pelvic pain, no dysmenorrhea, vaginal discharge or abnormal vaginal bleeding and as noted in HPI  Musculoskeletal: no complaints of arthralgia, no myalgia, no joint swelling or stiffness, no limb pain or swelling    Integumentary: no complaints of skin rash or lesion, itching or dry skin  Neurological: no complaints of headache, no confusion, no numbness or tingling, no dizziness or fainting    Objective      /88   Ht 5' 4" (1 626 m)   Wt 67 1 kg (148 lb)   BMI 25 40 kg/m²     General:   appears stated age, cooperative, alert normal mood and affect   Neck: normal, supple,trachea midline, no masses   Heart: regular rate and rhythm, S1, S2 normal, no murmur, click, rub or gallop   Lungs: clear to auscultation bilaterally   Breasts: normal appearance, no masses or tenderness   Abdomen: soft, non-tender, without masses or organomegaly   Vulva: normal, normal female genitalia   Vagina: normal vagina   Urethra: normal   Cervix: Normal, no discharge  Uterus: normal size, contour, position, consistency, mobility, non-tender   Adnexa: no mass, fullness, tenderness   Lymphatic palpation of lymph nodes in neck, axilla, groin and/or other locations: no lymphadenopathy or masses noted   Skin normal skin turgor and no rashes     Psychiatric orientation to person, place, and time: normal  mood and affect: normal

## 2021-02-19 ENCOUNTER — OFFICE VISIT (OUTPATIENT)
Dept: CARDIOLOGY CLINIC | Facility: CLINIC | Age: 66
End: 2021-02-19
Payer: COMMERCIAL

## 2021-02-19 VITALS
BODY MASS INDEX: 25.3 KG/M2 | DIASTOLIC BLOOD PRESSURE: 88 MMHG | WEIGHT: 147.4 LBS | SYSTOLIC BLOOD PRESSURE: 128 MMHG | HEART RATE: 84 BPM

## 2021-02-19 DIAGNOSIS — I10 ESSENTIAL HYPERTENSION: ICD-10-CM

## 2021-02-19 DIAGNOSIS — R07.9 CHEST PAIN, UNSPECIFIED TYPE: ICD-10-CM

## 2021-02-19 DIAGNOSIS — R00.2 PALPITATIONS: ICD-10-CM

## 2021-02-19 DIAGNOSIS — R06.02 SOB (SHORTNESS OF BREATH): Primary | ICD-10-CM

## 2021-02-19 DIAGNOSIS — R01.1 CARDIAC MURMUR: ICD-10-CM

## 2021-02-19 PROCEDURE — 3079F DIAST BP 80-89 MM HG: CPT

## 2021-02-19 PROCEDURE — 93000 ELECTROCARDIOGRAM COMPLETE: CPT

## 2021-02-19 PROCEDURE — 3074F SYST BP LT 130 MM HG: CPT

## 2021-02-19 PROCEDURE — 99204 OFFICE O/P NEW MOD 45 MIN: CPT

## 2021-02-19 PROCEDURE — 1036F TOBACCO NON-USER: CPT

## 2021-02-19 RX ORDER — ASPIRIN 81 MG/1
81 TABLET ORAL DAILY
COMMUNITY

## 2021-02-19 RX ORDER — IBUPROFEN 800 MG/1
800 TABLET ORAL DAILY
COMMUNITY
End: 2021-05-12 | Stop reason: DRUGHIGH

## 2021-02-19 NOTE — PROGRESS NOTES
Cardiology Consultation   MD Pavan Brumfield MD Signa Lichtenstein, DO, [facility] MD Elena Hewitt DO, Petros Govea DO, [facility] [place]  -------------------------------------------------------------------  [facility]  [address], [place] [address]  [phone]  [phone]  02/19/21  Jalen Torrez  YOB: 1955   MRN: [id number]      Referring Physician: Referral Self  [facility]  Evelin SHEARER  64      HPI:  I am seeing this patient in cardiology consultation for:   Chest pain, palpitations    Jalen Torrez is a 63 y o  female with  Hypertension, family history of premature coronary disease with multiple first-degree relatives having been diagnosed with coronary artery disease in their 46s who presents today for initial cardiac evaluation  She states for the past several weeks to months she has been having symptoms of intermittent palpitations that occur at rest   She notes associated substernal chest pressure as well as mild shortness of breath with the symptoms  The symptoms are nonexertional but occur suddenly and last for several minutes at a time  She denies any presyncope but does note some dizziness at times  She has no known coronary artery disease, but has never had a cardiac workup  She does not smoke, denies significant alcohol intake, denies drug use  Does have family history of premature CAD with multiple relatives having been diagnosed in their 46s      Review of Systems   Constitutional: Negative for chills and fever  HENT: Negative for facial swelling and sore throat  Eyes: Negative for visual disturbance  Respiratory: Positive for shortness of breath  Negative for cough, chest tightness and wheezing  Cardiovascular: Positive for chest pain and palpitations  Negative for leg swelling     Gastrointestinal: Negative for abdominal pain, blood in stool, constipation, diarrhea, nausea and vomiting  Endocrine: Negative for cold intolerance and heat intolerance  Genitourinary: Negative for decreased urine volume, difficulty urinating, dysuria and hematuria  Musculoskeletal: Negative for arthralgias, back pain and myalgias  Skin: Negative for rash  Neurological: Negative for dizziness, syncope, weakness and numbness  Psychiatric/Behavioral: Negative for agitation, behavioral problems and confusion  The patient is not nervous/anxious  OBJECTIVE  Vitals:    02/19/21 1310   BP: 128/88   Pulse: 84       Physical Exam   General appearance: alert and oriented, in no acute distress  Head: Normocephalic, without obvious abnormality, atraumatic  Eyes: conjunctivae/corneas clear  Anicteric  Neck: no adenopathy, no carotid bruit, no JVD  Lungs: clear to auscultation bilaterally  Heart: regular rate and rhythm, S1, S2 normal, 2/6 systolic murmur at the right upper sternal border, no click, rub or gallop  Abdomen:  soft, non-tender; bowel sounds normal; no masses,  no organomegaly  Extremities: extremities normal, warm and well-perfused; no cyanosis, clubbing, or edema  Skin: Skin color, texture, turgor normal  No rashes or lesions     EKG:  No results found for this visit on 02/19/21  IMPRESSION:  1   palpitations   2  Substernal chest pressure   3  Shortness of breath   4  Cardiac murmur,  new  5  Dizziness   6  Hypertension    DISCUSSION/RECOMMENDATIONS:    for symptoms of palpitations, chest pressure, as well as cardiac murmur found on physical examination I believe we should work this up further    Would check echocardiogram to evaluate cardiac murmur    Check regular treadmill stress test for symptoms of substernal chest pressure, as well as hypertension and shortness of breath   Check 2 week Zio patch for symptoms of intermittent palpitations that occur suddenly and are increasing in frequency   Reviewed lipid panel, stable, ASCVD risk score acceptable       Blood pressure relatively stable    Plan for follow-up after the above tests are performed for further recommendations    Howard Mcdonnell DO, [facility] [place]  --------------------------------------------------------------------------------  TREADMILL STRESS  No results found for this or any previous visit    ----------------------------------------------------------------------------------------------  NUCLEAR STRESS TEST: No results found for this or any previous visit  No results found for this or any previous visit     --------------------------------------------------------------------------------  CATH:  No results found for this or any previous visit   --------------------------------------------------------------------------------  ECHO:   No results found for this or any previous visit  No results found for this or any previous visit   --------------------------------------------------------------------------------  HOLTER  No results found for this or any previous visit   --------------------------------------------------------------------------------  CAROTIDS  No results found for this or any previous visit  Diagnoses and all orders for this visit:    SOB (shortness of breath)  -     POCT ECG  -     Echo complete with contrast if indicated; Future  -     AMB extended holter monitor; Future    Essential hypertension  -     Stress test only, exercise; Future    Cardiac murmur  -     Echo complete with contrast if indicated; Future    Chest pain, unspecified type  -     Stress test only, exercise; Future  -     AMB extended holter monitor; Future    Palpitations  -     AMB extended holter monitor; Future    Other orders  -     aspirin (ECOTRIN LOW STRENGTH) 81 mg EC tablet; Take 81 mg by mouth daily  -     ibuprofen (MOTRIN) 800 mg tablet;  Take 800 mg by mouth daily       ======================================================    Past Medical History:   Diagnosis Date    Chronic pain disorder     Colon polyp     Disorder of rotator cuff, right     Hypertension     Low back pain     PONV (postoperative nausea and vomiting)      Past Surgical History:   Procedure Laterality Date    BREAST BIOPSY Right      SECTION      COLONOSCOPY  2021    6 MONTH RECOMMENDED= LOW-GRADE DYSPLASIA    TONSILLECTOMY      TUBAL LIGATION           Medications  Current Outpatient Medications   Medication Sig Dispense Refill    aspirin (ECOTRIN LOW STRENGTH) 81 mg EC tablet Take 81 mg by mouth daily      ibuprofen (MOTRIN) 800 mg tablet Take 800 mg by mouth daily      olmesartan (BENICAR) 40 mg tablet Take 1 tablet (40 mg total) by mouth daily 90 tablet 1    zolpidem (AMBIEN) 10 mg tablet Take 1 tablet (10 mg total) by mouth daily at bedtime as needed for sleep 30 tablet 2    Na Sulfate-K Sulfate-Mg Sulf (Suprep Bowel Prep Kit) 17 5-3 13-1 6 GM/177ML SOLN Take 1 kit by mouth once for 1 dose 2 Bottle 0     No current facility-administered medications for this visit  Allergies   Allergen Reactions    Diphenhydramine Hyperactivity    Gabapentin Eye Swelling and Rash       Social History     Socioeconomic History    Marital status: /Civil Union     Spouse name: Not on file    Number of children: 2    Years of education: Not on file    Highest education level: Not on file   Occupational History     Comment: full time employment     Occupation: clerical   Social Needs    Financial resource strain: Not on file    Food insecurity     Worry: Not on file     Inability: Not on file   [employer] needs     Medical: Not on file     Non-medical: Not on file   Tobacco Use    Smoking status: Former Smoker     Packs/day: 0 10     Years: 10 00     Pack years: 1 00     Start date:     Smokeless tobacco: Never Used   Substance and Sexual Activity    Alcohol use:  Yes     Alcohol/week: 10 0 standard drinks     Types: 10 Cans of beer per week     Frequency: 4 or more times a week     Drinks per session: 3 or 4 Comment: social    Drug use: No    Sexual activity: Yes     Partners: Male     Birth control/protection: Female Sterilization   Lifestyle    Physical activity     Days per week: Not on file     Minutes per session: Not on file    Stress: Not on file   Relationships    Social connections     Talks on phone: Not on file     Gets together: Not on file     Attends Mu-ism service: Not on file     Active member of club or organization: Not on file     Attends meetings of clubs or organizations: Not on file     Relationship status: Not on file    Intimate partner violence     Fear of current or ex partner: Not on file     Emotionally abused: Not on file     Physically abused: Not on file     Forced sexual activity: Not on file   Other Topics Concern    Not on file   Social History Narrative    Denied history of domestic violence    House    Lives with family    No advance directives    No living will    [employer]    Supportive and safe    3 grandchild, 2 children        Family History   Problem Relation Age of Onset    Congenital heart disease Mother     Heart attack Mother         atrial     Congenital heart disease Father     No Known Problems Sister     No Known Problems Daughter     No Known Problems Maternal Grandmother     No Known Problems Maternal Grandfather     No Known Problems Paternal Grandmother     No Known Problems Paternal Grandfather     No Known Problems Sister     No Known Problems Sister        Lab Results   Component Value Date    WBC 6 23 01/07/2020    HGB 12 8 01/07/2020    HCT 40 9 01/07/2020    MCV 98 01/07/2020     01/07/2020      Lab Results   Component Value Date    SODIUM 138 04/24/2020    K 4 0 04/24/2020     04/24/2020    CO2 27 04/24/2020    BUN 18 04/24/2020    CREATININE 0 81 04/24/2020    GLUC 100 04/24/2020    CALCIUM 9 5 04/24/2020      Lab Results   Component Value Date    HGBA1C 5 5 04/24/2020      No results found for: CHOL  Lab Results Component Value Date    HDL 91 01/07/2020     Lab Results   Component Value Date    LDLCALC 102 (H) 01/07/2020     Lab Results   Component Value Date    TRIG 79 01/07/2020     No results found for: CHOLHDL   No results found for: INR, PROTIME       Patient Active Problem List    Diagnosis Date Noted    Acute right flank pain 12/28/2020    Pain in the coccyx 12/28/2020    At risk for osteoporosis 12/28/2020    Positive colorectal cancer screening using Cologuard test 11/05/2020    Rotator cuff arthropathy of right shoulder 12/20/2019    Sacroiliitis ([facility] [address] ) 09/16/2019    Bilateral groin pain     Spinal stenosis of lumbar region 08/01/2017    Acute right-sided low back pain with right-sided sciatica 07/05/2017    Hypertension 05/10/2017       Portions of the record may have been created with voice recognition software  Occasional wrong word or "sound a like" substitutions may have occurred due to the inherent limitations of voice recognition software  Read the chart carefully and recognize, using context, where substitutions have occurred          Mati Zavala DO, [facility] [place]  2/19/2021 2:19 PM

## 2021-02-22 DIAGNOSIS — I10 ESSENTIAL HYPERTENSION: ICD-10-CM

## 2021-02-22 RX ORDER — OLMESARTAN MEDOXOMIL 40 MG/1
TABLET ORAL
Qty: 90 TABLET | Refills: 1 | Status: SHIPPED | OUTPATIENT
Start: 2021-02-22 | End: 2021-10-25

## 2021-03-03 ENCOUNTER — TELEPHONE (OUTPATIENT)
Dept: CARDIOLOGY CLINIC | Facility: CLINIC | Age: 66
End: 2021-03-03

## 2021-03-03 NOTE — TELEPHONE ENCOUNTER
Phone call from patient  Has not received Zio patch ordered by Dr Lenard Adamson 2/19/21    Order was authorized by Jesus Kendall  But could not find it was processed to [facility]  Resubmitted to [facility]  Will wait for delivery to patient

## 2021-03-10 DIAGNOSIS — Z23 ENCOUNTER FOR IMMUNIZATION: ICD-10-CM

## 2021-03-16 ENCOUNTER — HOSPITAL ENCOUNTER (OUTPATIENT)
Dept: NON INVASIVE DIAGNOSTICS | Facility: HOSPITAL | Age: 66
Discharge: HOME | End: 2021-03-16
Payer: COMMERCIAL

## 2021-03-16 DIAGNOSIS — R07.9 CHEST PAIN, UNSPECIFIED TYPE: ICD-10-CM

## 2021-03-16 DIAGNOSIS — R06.02 SOB (SHORTNESS OF BREATH): ICD-10-CM

## 2021-03-16 DIAGNOSIS — R01.1 CARDIAC MURMUR: ICD-10-CM

## 2021-03-16 DIAGNOSIS — I10 ESSENTIAL HYPERTENSION: ICD-10-CM

## 2021-03-16 LAB
CHEST PAIN STATEMENT: NORMAL
MAX DIASTOLIC BP: 100 MMHG
MAX HEART RATE: 157 BPM
MAX PREDICTED HEART RATE: 157 BPM
MAX. SYSTOLIC BP: 160 MMHG
PROTOCOL NAME: NORMAL
REASON FOR TERMINATION: NORMAL
TARGET HR FORMULA: NORMAL
TEST INDICATION: NORMAL
TIME IN EXERCISE PHASE: NORMAL

## 2021-03-16 PROCEDURE — 93306 TTE W/DOPPLER COMPLETE: CPT

## 2021-03-16 PROCEDURE — 93018 CV STRESS TEST I&R ONLY: CPT

## 2021-03-16 PROCEDURE — 93016 CV STRESS TEST SUPVJ ONLY: CPT

## 2021-03-16 PROCEDURE — 93017 CV STRESS TEST TRACING ONLY: CPT

## 2021-03-16 NOTE — RESULT ENCOUNTER NOTE
Please call the patient regarding her normal result  Echo with preserved LV function, stress was borderline, likely normal response (had some non specific changes)  Will follow up with her at next office visit

## 2021-03-22 ENCOUNTER — IMMUNIZATIONS (OUTPATIENT)
Dept: FAMILY MEDICINE CLINIC | Facility: HOSPITAL | Age: 66
End: 2021-03-22

## 2021-03-22 DIAGNOSIS — Z23 ENCOUNTER FOR IMMUNIZATION: Primary | ICD-10-CM

## 2021-03-22 PROCEDURE — 91301 SARS-COV-2 / COVID-19 MRNA VACCINE (MODERNA) 100 MCG: CPT

## 2021-03-22 PROCEDURE — 0011A SARS-COV-2 / COVID-19 MRNA VACCINE (MODERNA) 100 MCG: CPT

## 2021-03-23 ENCOUNTER — TELEPHONE (OUTPATIENT)
Dept: FAMILY MEDICINE CLINIC | Facility: CLINIC | Age: 66
End: 2021-03-23

## 2021-03-23 DIAGNOSIS — F51.01 PRIMARY INSOMNIA: ICD-10-CM

## 2021-03-23 RX ORDER — ZOLPIDEM TARTRATE 10 MG/1
TABLET ORAL
Qty: 30 TABLET | Refills: 2 | Status: SHIPPED | OUTPATIENT
Start: 2021-03-23 | End: 2021-04-02 | Stop reason: SDUPTHER

## 2021-03-23 NOTE — TELEPHONE ENCOUNTER
MF, Pt is requesting a refill for her Ambien 10 mg but she would like a 90 day supply, Can we refill for 90?

## 2021-04-01 ENCOUNTER — CLINICAL SUPPORT (OUTPATIENT)
Dept: CARDIOLOGY CLINIC | Facility: CLINIC | Age: 66
End: 2021-04-01
Payer: COMMERCIAL

## 2021-04-01 DIAGNOSIS — R06.02 SOB (SHORTNESS OF BREATH): ICD-10-CM

## 2021-04-01 DIAGNOSIS — R00.2 PALPITATIONS: ICD-10-CM

## 2021-04-01 DIAGNOSIS — R07.9 CHEST PAIN, UNSPECIFIED TYPE: ICD-10-CM

## 2021-04-01 PROCEDURE — 93248 EXT ECG>7D<15D REV&INTERPJ: CPT

## 2021-04-02 ENCOUNTER — OFFICE VISIT (OUTPATIENT)
Dept: FAMILY MEDICINE CLINIC | Facility: CLINIC | Age: 66
End: 2021-04-02
Payer: COMMERCIAL

## 2021-04-02 VITALS
HEART RATE: 67 BPM | DIASTOLIC BLOOD PRESSURE: 84 MMHG | TEMPERATURE: 97.6 F | BODY MASS INDEX: 25.64 KG/M2 | HEIGHT: 64 IN | RESPIRATION RATE: 18 BRPM | SYSTOLIC BLOOD PRESSURE: 128 MMHG | WEIGHT: 150.2 LBS | OXYGEN SATURATION: 98 %

## 2021-04-02 DIAGNOSIS — I10 ESSENTIAL HYPERTENSION: Primary | ICD-10-CM

## 2021-04-02 DIAGNOSIS — F51.01 PRIMARY INSOMNIA: ICD-10-CM

## 2021-04-02 DIAGNOSIS — R00.2 PALPITATIONS: ICD-10-CM

## 2021-04-02 PROBLEM — R10.9 ACUTE RIGHT FLANK PAIN: Status: RESOLVED | Noted: 2020-12-28 | Resolved: 2021-04-02

## 2021-04-02 PROCEDURE — 3079F DIAST BP 80-89 MM HG: CPT | Performed by: FAMILY MEDICINE

## 2021-04-02 PROCEDURE — 3074F SYST BP LT 130 MM HG: CPT | Performed by: FAMILY MEDICINE

## 2021-04-02 PROCEDURE — 99213 OFFICE O/P EST LOW 20 MIN: CPT | Performed by: FAMILY MEDICINE

## 2021-04-02 PROCEDURE — 1036F TOBACCO NON-USER: CPT | Performed by: FAMILY MEDICINE

## 2021-04-02 PROCEDURE — 3008F BODY MASS INDEX DOCD: CPT | Performed by: FAMILY MEDICINE

## 2021-04-02 RX ORDER — ZOLPIDEM TARTRATE 10 MG/1
10 TABLET ORAL
Qty: 30 TABLET | Refills: 2 | Status: SHIPPED | OUTPATIENT
Start: 2021-04-02 | End: 2021-06-20

## 2021-04-02 NOTE — PROGRESS NOTES
Assessment/Plan:    Hypertension  BP stable    Primary insomnia  Refill meds    Palpitations  Await lab       Diagnoses and all orders for this visit:    Essential hypertension    Primary insomnia  -     zolpidem (AMBIEN) 10 mg tablet; Take 1 tablet (10 mg total) by mouth daily at bedtime as needed for sleep    Palpitations  -     CBC and differential; Future  -     TSH, 3rd generation; Future          Subjective:      Patient ID: Herson Momin is a 61 y o  female  F/u insomnia and HTN, still having palpitations, saw cards and Zio patch did not show any arrhythmia, up sometimes 3 or 4 in morning, unsure if it might be anxiety ebcause she is a worry wart      The following portions of the patient's history were reviewed and updated as appropriate: allergies, current medications, past family history, past medical history, past social history, past surgical history and problem list       Review of Systems   Constitutional: Negative for activity change, appetite change and fatigue  Respiratory: Negative for shortness of breath  Cardiovascular: Positive for palpitations  Negative for chest pain and leg swelling  Neurological: Negative for dizziness and headaches  Psychiatric/Behavioral: Positive for sleep disturbance  Objective:      /84   Pulse 67   Temp 97 6 °F (36 4 °C) (Temporal)   Resp 18   Ht 5' 4" (1 626 m)   Wt 68 1 kg (150 lb 3 2 oz)   SpO2 98%   BMI 25 78 kg/m²          Physical Exam  Vitals signs reviewed  Constitutional:       Appearance: Normal appearance  Neck:      Vascular: No carotid bruit  Cardiovascular:      Rate and Rhythm: Normal rate and regular rhythm  Pulses: Normal pulses  Heart sounds: Normal heart sounds  Pulmonary:      Effort: Pulmonary effort is normal       Breath sounds: Normal breath sounds  Musculoskeletal:      Right lower leg: No edema  Left lower leg: No edema  Lymphadenopathy:      Cervical: No cervical adenopathy  Neurological:      Mental Status: She is alert

## 2021-04-19 ENCOUNTER — TELEPHONE (OUTPATIENT)
Dept: FAMILY MEDICINE CLINIC | Facility: CLINIC | Age: 66
End: 2021-04-19

## 2021-04-19 NOTE — TELEPHONE ENCOUNTER
Pt has appt with you 5/12/21, having a lot of pain,any way she could be moved up a week   Thanks    Dr Jatin Cho

## 2021-04-21 ENCOUNTER — IMMUNIZATIONS (OUTPATIENT)
Dept: FAMILY MEDICINE CLINIC | Facility: HOSPITAL | Age: 66
End: 2021-04-21

## 2021-04-21 DIAGNOSIS — Z23 ENCOUNTER FOR IMMUNIZATION: Primary | ICD-10-CM

## 2021-04-21 PROCEDURE — 91301 SARS-COV-2 / COVID-19 MRNA VACCINE (MODERNA) 100 MCG: CPT

## 2021-04-21 PROCEDURE — 0012A SARS-COV-2 / COVID-19 MRNA VACCINE (MODERNA) 100 MCG: CPT

## 2021-05-12 ENCOUNTER — OFFICE VISIT (OUTPATIENT)
Dept: RHEUMATOLOGY | Facility: CLINIC | Age: 66
End: 2021-05-12
Payer: COMMERCIAL

## 2021-05-12 VITALS
SYSTOLIC BLOOD PRESSURE: 134 MMHG | HEART RATE: 77 BPM | DIASTOLIC BLOOD PRESSURE: 90 MMHG | BODY MASS INDEX: 25.58 KG/M2 | WEIGHT: 149 LBS

## 2021-05-12 DIAGNOSIS — M46.1 SACROILIITIS (HCC): Primary | ICD-10-CM

## 2021-05-12 DIAGNOSIS — M47.819 SPONDYLOARTHROPATHY: ICD-10-CM

## 2021-05-12 PROCEDURE — 99203 OFFICE O/P NEW LOW 30 MIN: CPT | Performed by: INTERNAL MEDICINE

## 2021-05-12 RX ORDER — OMEPRAZOLE 20 MG/1
20 CAPSULE, DELAYED RELEASE ORAL DAILY
Qty: 30 CAPSULE | Refills: 4 | Status: SHIPPED | OUTPATIENT
Start: 2021-05-12 | End: 2021-08-04

## 2021-05-12 RX ORDER — DICLOFENAC SODIUM 75 MG/1
75 TABLET, DELAYED RELEASE ORAL 2 TIMES DAILY
Qty: 60 TABLET | Refills: 2 | Status: SHIPPED | OUTPATIENT
Start: 2021-05-12 | End: 2022-01-11

## 2021-05-12 NOTE — PROGRESS NOTES
Rheumatology Consult   Ashvin Paiz 61 y o  female 1955    DATE: 5/12/2021    Reason for Consult: bilateral sacroiliac joint pain and inflammatory spinal pain  Assessment and Plan:  Spondyloarthritis--change NSAIDs to diclofenac 75 mg BID with food  Omeprazole for gastroprotection  Check labs and repeat xray SI joints and LS spine  She likely will be a candidate for anti TNF alpha vs anti IL 17 therapy  History of Present Illness:  Ashvin Paiz is a 61 y o  female 60 yo woman with inflammatory spinal pain for the past 3-4 years  No peripheral arthritis  She has received SI joint injections in the past   She has little to no relief with ibuprofen  All previous labs and xrays reviewed with the patient  Review of Systems  Review of Systems   Constitutional: Negative for chills, fatigue, fever and unexpected weight change  HENT: Negative for mouth sores and trouble swallowing  Eyes: Negative for pain and visual disturbance  Respiratory: Negative for cough and shortness of breath  Cardiovascular: Negative for chest pain and leg swelling  Gastrointestinal: Negative for abdominal pain, blood in stool, constipation, diarrhea and nausea  Musculoskeletal: Positive for back pain  Negative for arthralgias, joint swelling and myalgias  Skin: Negative for color change and rash  Neurological: Negative for weakness and numbness  Hematological: Negative for adenopathy  Psychiatric/Behavioral: Negative for sleep disturbance         Allergies  Allergies   Allergen Reactions    Diphenhydramine Hyperactivity    Gabapentin Eye Swelling and Rash       Current Medications      Past Medical History  Past Medical History:   Diagnosis Date    Chronic pain disorder     Colon polyp     Disorder of rotator cuff, right     Hypertension     Low back pain     PONV (postoperative nausea and vomiting)        Past Surgical History  Past Surgical History:   Procedure Laterality Date    BREAST BIOPSY Right      SECTION      COLONOSCOPY  2021    6 MONTH RECOMMENDED= LOW-GRADE DYSPLASIA    TONSILLECTOMY      TUBAL LIGATION         Family History  No known autoimmune or inflammatory diseases in the family  Family History   Problem Relation Age of Onset    Congenital heart disease Mother     Heart attack Mother         atrial     Congenital heart disease Father     No Known Problems Sister     No Known Problems Daughter     No Known Problems Maternal Grandmother     No Known Problems Maternal Grandfather     No Known Problems Paternal Grandmother     No Known Problems Paternal Grandfather     No Known Problems Sister     No Known Problems Sister        Social History  Occupation: clerical  Social History     Substance and Sexual Activity   Alcohol Use Yes    Alcohol/week: 10 0 standard drinks    Types: 10 Cans of beer per week    Frequency: 4 or more times a week    Drinks per session: 3 or 4    Comment: social     Social History     Substance and Sexual Activity   Drug Use No     Social History     Tobacco Use   Smoking Status Former Smoker    Packs/day: 0 10    Years: 10 00    Pack years: 1 00    Start date:    Smokeless Tobacco Never Used        Objective:  /90   Pulse 77   Wt 67 6 kg (149 lb)   BMI 25 58 kg/m²     Physical Exam  Musculoskeletal:      Lumbar back: She exhibits decreased range of motion and tenderness  Lab Results: I have personally reviewed pertinent reports        CBC:   , Chemistry Profile:       Invalid input(s): ALBUMIN, Coagulation Studies:   , Cardiac Studies:   , Additional Labs:   , iSTAT CHEM 8:       Invalid input(s): POTASSIUMIS, ABG:   , Toxicology:   , Last A1C/Lipid Panel/Thyroid Panel:   Lab Results   Component Value Date    HGBA1C 5 5 2020    TRIG 79 2020    HDL 91 2020    LDLCALC 102 (H) 2020    [id number] 1 197 2020     Lab Results   Component Value Date    CRP <3 0 10/14/2020 RAYMOND Negative 10/14/2020    RF Positive (A) 10/14/2020    HEPBIGM Reactive (A) 01/17/2019    HEPBCAB Non-reactive 01/17/2019    HEPCAB Non-reactive 01/17/2019    HBEAG Negative 01/30/2019    HBEAG Negative 01/30/2019           Invalid input(s): URIBILINOGEN         Imaging: I have personally reviewed pertinent films in PACS

## 2021-05-12 NOTE — PATIENT INSTRUCTIONS
I ordered a repeat xray of your sacroiliac joints  It turns out that you did have sacroiliac joint injections in the past, therefore, we will not repeat those  I ordered blood work for you to do at your leisure and I ordered the anti-inflammatory medication for you to take twice a day with food instead of the ibuprofen  I also ordered omeprazole for you to take daily to protect your stomach from the anti-inflammatories

## 2021-06-03 ENCOUNTER — OFFICE VISIT (OUTPATIENT)
Dept: CARDIOLOGY CLINIC | Facility: CLINIC | Age: 66
End: 2021-06-03
Payer: COMMERCIAL

## 2021-06-03 VITALS
HEART RATE: 63 BPM | DIASTOLIC BLOOD PRESSURE: 70 MMHG | SYSTOLIC BLOOD PRESSURE: 110 MMHG | HEIGHT: 64 IN | BODY MASS INDEX: 25.44 KG/M2 | OXYGEN SATURATION: 96 % | WEIGHT: 149 LBS

## 2021-06-03 DIAGNOSIS — I10 ESSENTIAL HYPERTENSION: Primary | ICD-10-CM

## 2021-06-03 DIAGNOSIS — R00.2 PALPITATIONS: ICD-10-CM

## 2021-06-03 PROCEDURE — 1036F TOBACCO NON-USER: CPT

## 2021-06-03 PROCEDURE — 99213 OFFICE O/P EST LOW 20 MIN: CPT

## 2021-06-03 PROCEDURE — 3074F SYST BP LT 130 MM HG: CPT

## 2021-06-03 PROCEDURE — 3008F BODY MASS INDEX DOCD: CPT

## 2021-06-03 PROCEDURE — 3078F DIAST BP <80 MM HG: CPT

## 2021-06-03 NOTE — PROGRESS NOTES
Cardiology   MD Rupinder Charles MD Debbra Glow, DO, MD Twin Jon DO, Josselyn Israel DO, [facility] [place]  -------------------------------------------------------------------  [facility]  [address] [place] [id number]  [phone]  [phone]  06/03/21  Zaki Orellana  YOB: 1955   MRN: [id number]      Referring Physician: Carina Sharpe MD  [facility],  [address]     HPI: Zaki Orellana is a 61 y o  female with  Hypertension, family history of premature coronary disease with multiple first-degree relatives having been diagnosed with coronary artery disease in their 46s who presents today for follow-up  At her initial evaluation she noted some symptoms of intermittent palpitations occur at rest   She also noted associated substernal chest pressure and mild shortness of breath  Echo showed EF 60 percent, grade 1 diastolic dysfunction, trace mitral regurgitation, mild tricuspid regurgitation  Stress test was equivocal for ischemia, however probably negative  There were upsloping ST segment changes which resolved within 1 minutes into recovery   Holter monitor did show 8 episodes of nonsustained SVT, 2 of which she was symptomatic from  She states that she continues to have intermittent episodes of palpitations only lasting for few seconds, not really affecting her day-to-day life or activities  She denies excessive caffeine or chocolate intake  She does drink beer about 3 beers per day 5 days per week    Review of Systems   Constitutional: Negative for chills and fever  HENT: Negative for facial swelling and sore throat  Eyes: Negative for visual disturbance  Respiratory: Negative for cough, chest tightness, shortness of breath and wheezing  Cardiovascular: Positive for palpitations  Negative for chest pain and leg swelling     Gastrointestinal: Negative for abdominal pain, blood in stool, constipation, diarrhea, nausea and vomiting  Endocrine: Negative for cold intolerance and heat intolerance  Genitourinary: Negative for decreased urine volume, difficulty urinating, dysuria and hematuria  Musculoskeletal: Negative for arthralgias, back pain and myalgias  Skin: Negative for rash  Neurological: Negative for dizziness, syncope, weakness and numbness  Psychiatric/Behavioral: Negative for agitation, behavioral problems and confusion  The patient is not nervous/anxious  OBJECTIVE  Vitals:    06/03/21 1544   BP: 110/70   Pulse: 63   SpO2: 96%       Physical Exam  Constitutional: awake, alert and oriented, in no acute distress, no obvious deformities  Head: Normocephalic, without obvious abnormality, atraumatic  Eyes: conjunctivae clear and moist  Sclera anicteric  No xanthelasmas  Pupils equal bilaterally  Extraocular motions are full  Ear nose mouth and throat: ears are symmetrical bilaterally, hearing appears to be equal bilaterally, no nasal discharge or epistaxis, oropharynx is clear with moist mucous membranes  Neck:  Trachea is midline, neck is supple, no thyromegaly or significant lymphadenopathy, there is full range of motion  Lungs: clear to auscultation bilaterally, no wheezes, no rales, no rhonchi, no accessory muscle use, breathing is nonlabored  Heart: regular rate and rhythm, S1, S2 normal, 2/6 murmur at the right upper sternal border, no click, rub or gallop, no lower extremity edema  Abdomen: soft, non-tender; bowel sounds normal; no masses,  no organomegaly  Psychiatric:  Patient is oriented to time, place, person, mood/affect is negative for depression, anxiety, agitation, appears to have appropriate insight  Skin: Skin is warm, dry, intact  No obvious rashes or lesions on exposed extremities  Nail beds are pink with no cyanosis or clubbing  EKG:  No results found for this visit on 06/03/21       IMPRESSION:  1  palpitations Zio patch with nonsustained SVT  2  Substernal chest pressure , atypical, stress test negative for ischemia  3  Dizziness   4  Hypertension    DISCUSSION/RECOMMENDATIONS:   At this time she is stable  She does have intermittent palpitations however not really affecting activities of daily living  Zio Patch was positive for short episodes of nonsustained SVTs, however given relatively low burden, we elected to continue to monitor these without further medical therapy at this time  If these increase in frequency or duration, instructed her on vagal maneuvers  She should return to the office if these are becoming more frequent or prolonged in duration  At that point medical therapy would be indicated  Otherwise will plan for 6 month follow-up for re-evaluation   I reviewed the stress test   There were equivocal changes, likely nonischemic  There were upsloping ST segment depressions which resolved in early recovery  Echo showed structurally normal appearing Heart    Rad Cochran DO, [facility] [place]  --------------------------------------------------------------------------------  TREADMILL STRESS  Results for orders placed during the hospital encounter of 21   Stress test only, exercise    Narrative [address] [place] [address], [address]  [phone]    Stress Electrocardiography during Exercise    Name: Shanika Perkins  MR #: [id number]  Account #: [de-identified]  Study date: 2021  : 1955  Age: 63 years  Gender: Female  Height: 64 in  Weight: 147 lb  BSA: 1 72 mï¾²    Allergies: DIPHENHYDRAMINE, GABAPENTIN    Diagnosis: I10  - Essential (primary) hypertension, R00 2 - Palpitations, R07 9 - Chest pain, unspecified    Primary Physician:  Robert Escalante  Referring Physician:  MICHAELLE Venegas   RN:  Leonie Prado RN  GROUP:  [facility]  Interpreting Physician:  MICHAELLE Venegas    Report Prepared By[de-identified]  Leonie Prado RN    CLINICAL QUESTION: Detection of coronary artery disease  HISTORY: Pt currently wearing zio monitor patch  The patient is a 63year old  female  Chest pain status: chest pain  Other symptoms: dyspnea and palpitations  Coronary artery disease risk factors: hypertension, former smoking,  and family history of premature coronary artery disease  Cardiovascular history: none significant  Medications: include an ACE inhibitor/ARB and aspirin  PHYSICAL EXAM: Baseline physical exam screening: no wheezes audible, + heart murmur    REST ECG: Normal sinus rhythm  PROCEDURE: Treadmill exercise testing was performed, using the Mariel protocol  Stress electrocardiographic evaluation was performed  MARIEL PROTOCOL:  HR bpm SBP mmHg DBP mmHg Symptoms  Baseline 72 140 96 none  Stage 1 127 150 90 none  Stage 2 157 160 96 dyspnea, fatigue  Immediate 157 -- -- same as above  Recovery 1 134 160 100 same as above  Recovery 2 104 -- -- subsiding  Recovery 3 97 140 96 subsiding  Recovery 5 95 136 90 none  No medications or fluids given  STRESS SUMMARY: Baseline O2 sat 100%, Peak O2 sat 94% Duration of exercise was 6 min and 0 sec  The patient exercised to protocol stage 2  Maximal work rate was 7 METs  Maximal heart rate during stress was 157 bpm ( 100 % of maximal  predicted heart rate)  The heart rate response to stress was normal  There was resting hypertension with a blunted blood pressure response to stress  The rate-pressure product for the peak heart rate and blood pressure was 32965  There was  no chest pain during stress  The stress test was terminated due to dyspnea and fatigue  The stress ECG was equivocal for ischemia  The patient developed 1 mm horizontal to upsloping ST segment depressions at peak exercise in II, III, aVF,  V4-V6, which quickly resolved inside of 1 minute into recovery Arrhythmia during stress: isolated premature ventricular beats  SUMMARY:  -  Stress results: Duration of exercise was 6 min and 0 sec  Target heart rate was achieved  There was resting hypertension with a blunted blood pressure response to stress  There was no chest pain during stress  -  ECG conclusions: The stress ECG was equivocal for ischemia  The patient developed 1 mm horizontal to upsloping ST segment depressions at peak exercise in II, III, aVF, V4-V6, which quickly resolved inside of 1 minute into recovery    IMPRESSIONS: Equivocal study after maximal exercise  There were borderline EKG changes for ischemia at maximum exercise with 1mm horizontal to upsloping ST segment depressions in the inferior and lateral leads, which resolved completely  within 1 minute into recovery  Prepared and signed by    MICHAELLE Venegas  Signed 2021 16:42:59        ----------------------------------------------------------------------------------------------  NUCLEAR STRESS TEST: No results found for this or any previous visit  No results found for this or any previous visit     --------------------------------------------------------------------------------  CATH:  No results found for this or any previous visit   --------------------------------------------------------------------------------  ECHO:   Results for orders placed during the hospital encounter of 21   Echo complete with contrast if indicated    Narrative [address] [place] [address]  [place], [address]  [phone]    Transthoracic Echocardiogram  2D, M-mode, Doppler, and Color Doppler    Study date:  16-Mar-2021    Patient: Elicia Helms  MR number: [id number]  Account number: [de-identified]  : 1955  Age: 63 years  Gender: Female  Status: Outpatient  Location: Echo lab  Height: 64 in  Weight: 146 7 lb  BP: 128/ 88 mmHg    Indications: shortness of breath, murmur  Diagnoses: R01 1 - Cardiac murmur, unspecified, R06 02 - Shortness of breath    Sonographer:  Vitaly Soni RDCS  Primary Physician:   Lonnie Serrano MD  Referring Physician:  MICHAELLE Cabrales  Group:  [facility]  Interpreting Physician:  MICHAELLE Cabrales     SUMMARY    LEFT VENTRICLE:  Systolic function was normal by visual assessment  Ejection fraction was estimated to be 60 %  There were no regional wall motion abnormalities  Doppler parameters were consistent with abnormal left ventricular relaxation (grade 1 diastolic dysfunction)  MITRAL VALVE:  There was mild to moderate annular calcification  There was trace regurgitation  TRICUSPID VALVE:  There was mild regurgitation  Estimated peak PA pressure was 29 mmHg  IVC, HEPATIC VEINS:  The inferior vena cava was normal in size and course  Respirophasic changes were normal     SUMMARY MEASUREMENTS  2D measurements:  Unspecified Anatomy:   LAESV Index (A-L) was 24 5 ml/m2  LAESVInd MOD BP was 23 ml/m2  RAAs A4C was 10 2 cm2  RWT was 0 4    CW measurements:  Unspecified Anatomy:   TR Vmax was 2 5 m/s   TR maxPG was 25 8 mmHg  MM measurements:  Unspecified Anatomy:   TAPSE was 2 8 cm  PW measurements:  Unspecified Anatomy:   E' Avg was 0 1 m/s  E' Lat was 0 m/s  E' Sept was 0 1 m/s  E/E' Avg was 10 3   E/E' Lat was 14 1   E/E' Sept was 8 1   MV A Adeel was 0 7 m/s  MV Dec Bath was 2 4 m/s2  MV DecT was 222 2 ms   MV E Adeel was 0 5  m/s  MV E/A Ratio was 0 7   TV E' lat was 0 1 m/s  HISTORY: PRIOR HISTORY: hypertension  PROCEDURE: The procedure was performed in the echo lab  This was a routine study  The transthoracic approach was used  The study included complete 2D imaging, M-mode, complete spectral Doppler, and color Doppler  The heart rate was 84 bpm,  at the start of the study  Images were obtained from the parasternal, apical, subcostal, and suprasternal notch acoustic windows  Echocardiographic views were limited due to lung interference  Image quality was adequate  LEFT VENTRICLE: Size was normal  Systolic function was normal by visual assessment   Ejection fraction was estimated to be 60 %  There were no regional wall motion abnormalities  Wall thickness was normal  DOPPLER: Doppler parameters were  consistent with abnormal left ventricular relaxation (grade 1 diastolic dysfunction)  RIGHT VENTRICLE: The size was normal  Systolic function was normal  Wall thickness was normal     LEFT ATRIUM: Size was normal     RIGHT ATRIUM: Size was normal     MITRAL VALVE: There was mild to moderate annular calcification  Valve structure was normal  There was mild thickening of the anterior leaflet  There was normal leaflet separation  DOPPLER: The transmitral velocity was within the normal  range  There was no evidence for stenosis  There was trace regurgitation  AORTIC VALVE: The valve was trileaflet  Leaflets exhibited normal thickness and normal cuspal separation  DOPPLER: Transaortic velocity was within the normal range  There was no evidence for stenosis  There was no regurgitation  TRICUSPID VALVE: The valve structure was normal  There was normal leaflet separation  DOPPLER: The transtricuspid velocity was within the normal range  There was no evidence for stenosis  There was mild regurgitation  Estimated peak PA  pressure was 29 mmHg  PULMONIC VALVE: Not well visualized  DOPPLER: There was no significant regurgitation  PERICARDIUM: There was no pericardial effusion  The pericardium was normal in appearance  AORTA: The root exhibited normal size  SYSTEMIC VEINS: IVC: The inferior vena cava was normal in size and course   Respirophasic changes were normal     SYSTEM MEASUREMENT TABLES    2D  %FS: 32 8 %  Ao Diam: 3 9 cm  EDV(Teich): 73 1 ml  EF(Teich): 61 8 %  ESV(Teich): 28 ml  IVSd: 1 cm  LA Diam: 3 5 cm  LAAs A2C: 14 7 cm2  LAAs A4C: 14 6 cm2  LAESV A-L A2C: 38 4 ml  LAESV A-L A4C: 41 8 ml  LAESV Index (A-L): 24 5 ml/m2  LAESV MOD A2C: 35 3 ml  LAESV MOD A4C: 41 ml  LAESV(A-L): 42 2 ml  LAESV(MOD BP): 39 5 ml  LAESVInd MOD BP: 23 ml/m2  LALs A2C: 4 8 cm  LALs A4C: 4 3 cm  LVIDd: 4 1 cm  LVIDs: 2 7 cm  LVPWd: 0 8 cm  RAAs A4C: 10 2 cm2  RAESV A-L: 21 1 ml  RAESV MOD: 22 2 ml  RALs: 4 2 cm  RVIDd: 2 cm  RWT: 0 4  SV(Teich): 45 2 ml    CW  TR Vmax: 2 5 m/s  TR maxP 8 mmHg    MM  TAPSE: 2 8 cm    PW  E' Av 1 m/s  E' Lat: 0 m/s  E' Sept: 0 1 m/s  E/E' Avg: 10 3  E/E' Lat: 14 1  E/E' Sept: 8 1  MV A Adeel: 0 7 m/s  MV Dec Gage: 2 4 m/s2  MV DecT: 222 2 ms  MV E Adeel: 0 5 m/s  MV E/A Ratio: 0 7  TV E' lat: 0 1 m/s    [facility]    Prepared and electronically signed by    MICHAELLE Quintanilla  Signed 16-Mar-2021 16:56:32       No results found for this or any previous visit   --------------------------------------------------------------------------------  HOLTER  No results found for this or any previous visit    No results found for this or any previous visit   --------------------------------------------------------------------------------  CAROTIDS  No results found for this or any previous visit    --------------------------------------------------------------------------------  There are no diagnoses linked to this encounter    ======================================================    Past Medical History:   Diagnosis Date    Chronic pain disorder     Colon polyp     Disorder of rotator cuff, right     Hypertension     Low back pain     PONV (postoperative nausea and vomiting)      Past Surgical History:   Procedure Laterality Date    BREAST BIOPSY Right      SECTION      COLONOSCOPY  2021    6 MONTH RECOMMENDED= LOW-GRADE DYSPLASIA    TONSILLECTOMY      TUBAL LIGATION           Medications  Current Outpatient Medications   Medication Sig Dispense Refill    aspirin (ECOTRIN LOW STRENGTH) 81 mg EC tablet Take 81 mg by mouth daily      diclofenac (VOLTAREN) 75 mg EC tablet Take 1 tablet (75 mg total) by mouth 2 (two) times a day 60 tablet 2    olmesartan (BENICAR) 40 mg tablet TAKE 1 TABLET BY MOUTH EVERY DAY 90 tablet 1    omeprazole (PriLOSEC) 20 mg delayed release capsule Take 1 capsule (20 mg total) by mouth daily 30 capsule 4    zolpidem (AMBIEN) 10 mg tablet Take 1 tablet (10 mg total) by mouth daily at bedtime as needed for sleep 30 tablet 2     No current facility-administered medications for this visit  Allergies   Allergen Reactions    Diphenhydramine Hyperactivity    Gabapentin Eye Swelling and Rash       Social History     Socioeconomic History    Marital status: /Civil Union     Spouse name: Not on file    Number of children: 2    Years of education: Not on file    Highest education level: Not on file   Occupational History     Comment: full time employment     Occupation: clerical   Social Needs    Financial resource strain: Not on file    Food insecurity     Worry: Not on file     Inability: Not on file   [employer] needs     Medical: Not on file     Non-medical: Not on file   Tobacco Use    Smoking status: Former Smoker     Packs/day: 0 10     Years: 10 00     Pack years: 1 00     Start date: 2008    Smokeless tobacco: Never Used   Substance and Sexual Activity    Alcohol use:  Yes     Alcohol/week: 10 0 standard drinks     Types: 10 Cans of beer per week     Frequency: 4 or more times a week     Drinks per session: 3 or 4     Comment: social    Drug use: No    Sexual activity: Yes     Partners: Male     Birth control/protection: Female Sterilization   Lifestyle    Physical activity     Days per week: Not on file     Minutes per session: Not on file    Stress: Not on file   Relationships    Social connections     Talks on phone: Not on file     Gets together: Not on file     Attends [employer] service: Not on file     Active member of club or organization: Not on file     Attends meetings of clubs or organizations: Not on file     Relationship status: Not on file    Intimate partner violence     Fear of current or ex partner: Not on file     Emotionally abused: Not on file     Physically abused: Not on file     Forced sexual activity: Not on file   Other Topics Concern    Not on file   Social History Narrative    Denied history of domestic violence    House    Lives with family    No advance directives    No living will    B[employer]ism    Supportive and safe    3 grandchild, 2 children        Family History   Problem Relation Age of Onset    Congenital heart disease Mother     Heart attack Mother         atrial     Congenital heart disease Father     No Known Problems Sister     No Known Problems Daughter     No Known Problems Maternal Grandmother     No Known Problems Maternal Grandfather     No Known Problems Paternal Grandmother     No Known Problems Paternal Grandfather     No Known Problems Sister     No Known Problems Sister        Lab Results   Component Value Date    WBC 6 23 01/07/2020    HGB 12 8 01/07/2020    HCT 40 9 01/07/2020    MCV 98 01/07/2020     01/07/2020      Lab Results   Component Value Date    SODIUM 138 04/24/2020    K 4 0 04/24/2020     04/24/2020    CO2 27 04/24/2020    BUN 18 04/24/2020    CREATININE 0 81 04/24/2020    GLUC 100 04/24/2020    CALCIUM 9 5 04/24/2020      Lab Results   Component Value Date    HGBA1C 5 5 04/24/2020      No results found for: CHOL  Lab Results   Component Value Date    HDL 91 01/07/2020     Lab Results   Component Value Date    LDLCALC 102 (H) 01/07/2020     Lab Results   Component Value Date    TRIG 79 01/07/2020     No results found for: CHOLHDL   No results found for: INR, PROTIME       Patient Active Problem List    Diagnosis Date Noted    Primary insomnia 04/02/2021     Priority: Low    Palpitations 04/02/2021     Priority: Low    Pain in the coccyx 12/28/2020     Priority: Low    At risk for osteoporosis 12/28/2020     Priority: Low    Positive colorectal cancer screening using Cologuard test 11/05/2020     Priority: Low    Rotator cuff arthropathy of right shoulder 12/20/2019     Priority: Low    Sacroiliitis ([address] ) 09/16/2019     Priority: Low    Bilateral groin pain      Priority: Low    Spinal stenosis of lumbar region 08/01/2017     Priority: Low    Acute right-sided low back pain with right-sided sciatica 07/05/2017     Priority: Low    Hypertension 05/10/2017     Priority: Low       Portions of the record may have been created with voice recognition software  Occasional wrong word or "sound a like" substitutions may have occurred due to the inherent limitations of voice recognition software  Read the chart carefully and recognize, using context, where substitutions have occurred      Jessika Mccord DO, [facility] [place]  6/3/2021 4:10 PM

## 2021-06-09 ENCOUNTER — TELEPHONE (OUTPATIENT)
Dept: RHEUMATOLOGY | Facility: CLINIC | Age: 66
End: 2021-06-09

## 2021-06-09 NOTE — TELEPHONE ENCOUNTER
LMOM to reschedule appointment on 8/25 due to provider being out of office  We have opening the week prior   Can come sooner if need be

## 2021-07-13 NOTE — TELEPHONE ENCOUNTER
Called again to reschedule no answer left voicemail that it needs to be rescheduled and this was the third time reaching out so the appointment will be cancelled  Left number for her to call back and reschedule if need be

## 2021-07-26 ENCOUNTER — OFFICE VISIT (OUTPATIENT)
Dept: URGENT CARE | Facility: CLINIC | Age: 66
End: 2021-07-26

## 2021-07-26 VITALS
DIASTOLIC BLOOD PRESSURE: 78 MMHG | OXYGEN SATURATION: 98 % | RESPIRATION RATE: 16 BRPM | TEMPERATURE: 97.6 F | HEART RATE: 68 BPM | SYSTOLIC BLOOD PRESSURE: 120 MMHG

## 2021-07-26 DIAGNOSIS — L25.5 RHUS DERMATITIS: Primary | ICD-10-CM

## 2021-07-26 RX ORDER — TRIAMCINOLONE ACETONIDE 5 MG/G
OINTMENT TOPICAL 2 TIMES DAILY
Qty: 30 G | Refills: 0 | Status: SHIPPED | OUTPATIENT
Start: 2021-07-26 | End: 2021-08-17 | Stop reason: ALTCHOICE

## 2021-07-26 NOTE — PATIENT INSTRUCTIONS
Use triamcinolone ointment as prescribed  Keep skin cool and dry  Take lukewarm showers, avoid heat and sweating  Use calamine lotion and Aveno products with colloidal oatmeal  Follow up with your PCP or return to the clinic if symptoms worsen or persist more than 5-7 days  Poison Ivy   WHAT YOU NEED TO KNOW:   What is poison ivy? Poison ivy is a plant that can cause an itchy, uncomfortable rash on your skin  Poison ivy grows as a shrub or vine in woods, fields, and areas of thick [place]  It has 3 bright green leaves on each stem that turn red in [place]  What causes a poison ivy rash? A poison ivy rash can occur when the plant oil soaks into your skin  You may get a rash if you touch:  · Any part of the poison ivy plant: This includes the leaves, stem, vine, roots, flowers, and berries  · Pets with poison ivy on their fur:  They can spread poison ivy oil to your skin and to items inside your car and house  · Items with poison ivy oil on them: This includes clothing, shoes, camping or sports equipment, or outdoor tools  · A person with poison ivy oil on him:  Poison ivy oil may be on their skin or clothing  What can I do if I have been exposed to poison ivy? If you think you have touched poison ivy, rinse your skin with cool water right away  Then, wash it with soap and water  Rinse your skin well  Do not use hot water because it may cause the oil to spread on your skin  You may also put rubbing alcohol or a solution of 1/2 alcohol and 1/2 water on your skin  This may help your rash to be less severe when it breaks out on your skin  What are the signs and symptoms of a poison ivy rash?    · A red, swollen, itchy rash that develops within hours to days of exposure to poison ivy    · A rash that appears in thick patches or thin lines where the plant leaves rubbed against your skin    · Blisters that may leak clear to yellow liquid, then crust over and become scaly    How is a poison ivy rash treated? · Antiseptic or drying creams or ointments:  Your healthcare provider may recommend medicine to dry out the rash and decrease the itching  These products may be available without a doctor's order  · Steroids: This medicine helps decrease itching and inflammation  It can be given as a cream to apply to your skin or as a pill  · Antihistamines: This medicine may help decrease itching and help you sleep  It is available without a doctor's order  How can I manage my symptoms? · Keep your rash clean and dry:  Wash it with soap and water  Gently pat it dry with a clean towel  · Try not to scratch or rub your rash: This can cause your skin to become infected  · Use a compress on your rash:  Dip a clean washcloth in cool water  Wring it out and place it on your rash  Leave the washcloth on your skin for 15 minutes  Do this at least 3 times per day  · Take a cornstarch or oatmeal bath: If your rash is too large to cover with wet washcloths, take 3 or 4 cornstarch baths daily  Mix 1 pound of cornstarch with a little water to make a paste  Add the paste to a tub full of water and mix well  You may also use colloidal oatmeal in the bath water  Use lukewarm water  Avoid hot water because it may cause your itching to increase  Can a poison ivy rash be spread by scratching or touching it? You cannot spread poison ivy by touching your rash or the liquid from your blisters  Poison ivy is spread only if you scratch your skin while it still has oil on it  You may think your rash is spreading because new rashes appear over a number of days  This happens because areas covered by thin skin break out in a rash first  Your face or forearms may develop a rash before thicker areas, such as the palms of your hands  How can I prevent a poison ivy rash? · Wear skin protection:  Wear long pants, a long-sleeved shirt, and gloves   Use a skin block lotion to protect your skin from poison ivy oil  You can find this at a drugstore without a prescription  · Wash clothing after possible exposure: If you think you have been near a poison ivy plant, wash the clothes you were wearing separately from other clothes  Rinse the washing machine well after you take the clothes out  Scrub boots and shoes with warm, soapy water  Dry clean items and clothing that you cannot wash in water  Poison ivy oil is sticky and can stay on surfaces for a long time  It can cause a new rash even years later  · Bathe your pet:  Use warm water and shampoo on your pet's fur  This will prevent the spread of oil to your skin, car, and home  Wear long sleeves, long pants, and gloves while washing pets or any items that may have oil on them  · Reduce exposure to poison ivy:  Do not touch plants that look like poison ivy  Keep your yard free of poison ivy  While protecting your skin, remove the plant and the roots  Place them in a plastic bag and seal the bag tightly  · Do not burn poison ivy plants: This can spread the oil through the air  If you breathe the oil into your lungs, you could have swelling and serious breathing problems  Oil that clings to the fire parris can land on your skin and cause a rash  When should I contact my healthcare provider? · You have pus, soft yellow scabs, or tenderness on the rash  · The itching gets worse or keeps you awake at night  · The rash covers more than 1/4 of your skin or spreads to your eyes, mouth, or genital area  · The rash is not better after 2 to 3 weeks  · You have tender, swollen glands on the sides of your neck  · You have swelling in your arms and legs  · You have questions or concerns about your condition or care  When should I seek immediate care or call 911? · You have a fever  · You have redness, swelling, and tenderness around the rash  · You have trouble breathing  CARE AGREEMENT:   You have the right to help plan your care   Learn about your health condition and how it may be treated  Discuss treatment options with your healthcare providers to decide what care you want to receive  You always have the right to refuse treatment  The above information is an  only  It is not intended as medical advice for individual conditions or treatments  Talk to your doctor, nurse or pharmacist before following any medical regimen to see if it is safe and effective for you  © Copyright [employer] 2021 Information is for End User's use only and may not be sold, redistributed or otherwise used for commercial purposes   All illustrations and images included in CareNotes® are the copyrighted property of [employer]  or [address] [place], [place] [address]pe

## 2021-07-26 NOTE — PROGRESS NOTES
[facility]        NAME: Thad Amos is a 61 y o  female  : 1955    MRN: [id number]  DATE: 2021  TIME: 11:08 AM    Assessment and Plan   Rhus dermatitis [L25 5]  1  Rhus dermatitis  triamcinolone (KENALOG) 0 5 % ointment         Patient Instructions       Use triamcinolone ointment as prescribed  Keep skin cool and dry  Take lukewarm showers, avoid heat and sweating  Use calamine lotion and Aveno products with colloidal oatmeal  Follow up with your PCP or return to the clinic if symptoms worsen or persist more than 5-7 days  Proceed to  ER if symptoms worsen  Chief Complaint     Chief Complaint   Patient presents with    Rash         History of Present Illness       Poison ivy rash to b/l forearms and hands x 2 days  She was pulling weeds the day before rash started  She tried OTC cream- mild improvement  And tried steroid cream this morning, has not noticed an improvement yet  No fevers or chills  Review of Systems   Review of Systems   Constitutional: Negative  Negative for chills and fever  HENT: Negative  Respiratory: Negative  Negative for cough, shortness of breath and wheezing  Cardiovascular: Negative  Negative for chest pain and palpitations  Musculoskeletal: Negative  Skin: Positive for rash (b/l forearms & hands)  Allergic/Immunologic: Negative  Neurological: Negative  All other systems reviewed and are negative          Current Medications       Current Outpatient Medications:     aspirin (ECOTRIN LOW STRENGTH) 81 mg EC tablet, Take 81 mg by mouth daily, Disp: , Rfl:     olmesartan (BENICAR) 40 mg tablet, TAKE 1 TABLET BY MOUTH EVERY DAY, Disp: 90 tablet, Rfl: 1    zolpidem (AMBIEN) 10 mg tablet, TAKE 1 TABLET BY MOUTH DAILY AT BEDTIME AS NEEDED FOR SLEEP, Disp: 30 tablet, Rfl: 2    diclofenac (VOLTAREN) 75 mg EC tablet, Take 1 tablet (75 mg total) by mouth 2 (two) times a day, Disp: 60 tablet, Rfl: 2    omeprazole (PriLOSEC) 20 mg delayed release capsule, Take 1 capsule (20 mg total) by mouth daily, Disp: 30 capsule, Rfl: 4    triamcinolone (KENALOG) 0 5 % ointment, Apply topically 2 (two) times a day, Disp: 30 g, Rfl: 0    Current Allergies     Allergies as of 2021 - Reviewed 2021   Allergen Reaction Noted    Diphenhydramine Hyperactivity 2017    Gabapentin Eye Swelling and Rash 11/15/2017            The following portions of the patient's history were reviewed and updated as appropriate: allergies, current medications, past family history, past medical history, past social history, past surgical history and problem list      Past Medical History:   Diagnosis Date    Chronic pain disorder     Colon polyp     Disorder of rotator cuff, right     Hypertension     Low back pain     PONV (postoperative nausea and vomiting)        Past Surgical History:   Procedure Laterality Date    BREAST BIOPSY Right      SECTION      COLONOSCOPY  2021    6 MONTH RECOMMENDED= LOW-GRADE DYSPLASIA    TONSILLECTOMY      TUBAL LIGATION         Family History   Problem Relation Age of Onset    Congenital heart disease Mother     Heart attack Mother         atrial     Congenital heart disease Father     No Known Problems Sister     No Known Problems Daughter     No Known Problems Maternal Grandmother     No Known Problems Maternal Grandfather     No Known Problems Paternal Grandmother     No Known Problems Paternal Grandfather     No Known Problems Sister     No Known Problems Sister          Medications have been verified  Objective   /78 (BP Location: Right arm, Patient Position: Sitting, Cuff Size: Adult)   Pulse 68   Temp 97 6 °F (36 4 °C) (Tympanic)   Resp 16   SpO2 98%   No LMP recorded  Patient is postmenopausal        Physical Exam     Physical Exam  Vitals reviewed  Constitutional:       Appearance: Normal appearance  HENT:      Head: Normocephalic and atraumatic        Right Ear: External ear normal       Left Ear: External ear normal       Nose: Nose normal       Mouth/Throat:      Mouth: Mucous membranes are moist       Pharynx: Oropharynx is clear  Eyes:      Conjunctiva/sclera: Conjunctivae normal    Cardiovascular:      Rate and Rhythm: Normal rate and regular rhythm  Pulses: Normal pulses  Pulmonary:      Effort: Pulmonary effort is normal    Musculoskeletal:         General: Normal range of motion  Skin:     General: Skin is warm and dry  Capillary Refill: Capillary refill takes less than 2 seconds  Findings: Rash present  Rash is vesicular  Neurological:      Mental Status: She is alert     Psychiatric:         Mood and Affect: Mood normal          Behavior: Behavior normal

## 2021-07-26 NOTE — ASSESSMENT & PLAN NOTE
History and exam findings consistent with rhus dermatitis  Rx written for triamcinolone ointment  Pt educated on appropriate use  Symptomatic care recommended  Reasons to follow up reviewed with pt

## 2021-08-04 DIAGNOSIS — M47.819 SPONDYLOARTHROPATHY: ICD-10-CM

## 2021-08-04 DIAGNOSIS — M46.1 SACROILIITIS (HCC): ICD-10-CM

## 2021-08-04 RX ORDER — OMEPRAZOLE 20 MG/1
CAPSULE, DELAYED RELEASE ORAL
Qty: 90 CAPSULE | Refills: 1 | Status: SHIPPED | OUTPATIENT
Start: 2021-08-04 | End: 2022-01-11

## 2021-08-17 ENCOUNTER — OFFICE VISIT (OUTPATIENT)
Dept: URGENT CARE | Facility: CLINIC | Age: 66
End: 2021-08-17

## 2021-08-17 VITALS
SYSTOLIC BLOOD PRESSURE: 122 MMHG | HEART RATE: 74 BPM | TEMPERATURE: 98.1 F | DIASTOLIC BLOOD PRESSURE: 84 MMHG | RESPIRATION RATE: 16 BRPM | OXYGEN SATURATION: 97 %

## 2021-08-17 DIAGNOSIS — N30.90 CYSTITIS: Primary | ICD-10-CM

## 2021-08-17 LAB
SL AMB  POCT GLUCOSE, UA: NEGATIVE
SL AMB LEUKOCYTE ESTERASE,UA: ABNORMAL
SL AMB POCT BILIRUBIN,UA: NEGATIVE
SL AMB POCT BLOOD,UA: NEGATIVE
SL AMB POCT CLARITY,UA: CLEAR
SL AMB POCT COLOR,UA: YELLOW
SL AMB POCT KETONES,UA: NEGATIVE
SL AMB POCT NITRITE,UA: NEGATIVE
SL AMB POCT PH,UA: 5
SL AMB POCT SPECIFIC GRAVITY,UA: 1.01
SL AMB POCT URINE PROTEIN: NEGATIVE
SL AMB POCT UROBILINOGEN: NEGATIVE

## 2021-08-17 PROCEDURE — 87086 URINE CULTURE/COLONY COUNT: CPT | Performed by: NURSE PRACTITIONER

## 2021-08-17 RX ORDER — NITROFURANTOIN 25; 75 MG/1; MG/1
100 CAPSULE ORAL 2 TIMES DAILY
Qty: 14 CAPSULE | Refills: 0 | Status: SHIPPED | OUTPATIENT
Start: 2021-08-17 | End: 2021-08-24

## 2021-08-17 NOTE — PROGRESS NOTES
[facility]        NAME: Stephy Lau is a 61 y o  female  : 1955    MRN: [id number]  DATE: 2021  TIME: 9:45 AM    Assessment and Plan   Cystitis [N30 90]  1  Cystitis  nitrofurantoin (MACROBID) 100 mg capsule    POCT urine dip    Urine culture         Patient Instructions       Take Macrobid as prescribed  Take with food  Increase water intake  Follow up with your PCP or return to the clinic if symptoms worsen or persist more than 1-3 days  Proceed to  ER if symptoms worsen  Chief Complaint     Chief Complaint   Patient presents with    Possible UTI         History of Present Illness       Possible UTI starting last night  Urinary frequency, burning with urination, urinary urgency, foul smelling urine present  No fever, chills, or flank pain  She was at an outdoor concert a few days ago and "held her bladder" for a long time due to long bathroom lines  This is her first UTI  Review of Systems   Review of Systems   Constitutional: Negative  Respiratory: Negative  Cardiovascular: Negative  Gastrointestinal: Negative  Genitourinary: Positive for dysuria, frequency and urgency  Negative for flank pain, hematuria, vaginal discharge and vaginal pain  All other systems reviewed and are negative          Current Medications       Current Outpatient Medications:     aspirin (ECOTRIN LOW STRENGTH) 81 mg EC tablet, Take 81 mg by mouth daily, Disp: , Rfl:     olmesartan (BENICAR) 40 mg tablet, TAKE 1 TABLET BY MOUTH EVERY DAY, Disp: 90 tablet, Rfl: 1    omeprazole (PriLOSEC) 20 mg delayed release capsule, TAKE 1 CAPSULE BY MOUTH EVERY DAY, Disp: 90 capsule, Rfl: 1    zolpidem (AMBIEN) 10 mg tablet, TAKE 1 TABLET BY MOUTH DAILY AT BEDTIME AS NEEDED FOR SLEEP, Disp: 30 tablet, Rfl: 2    diclofenac (VOLTAREN) 75 mg EC tablet, Take 1 tablet (75 mg total) by mouth 2 (two) times a day, Disp: 60 tablet, Rfl: 2    nitrofurantoin (MACROBID) 100 mg capsule, Take 1 capsule (100 mg total) by mouth 2 (two) times a day for 7 days, Disp: 14 capsule, Rfl: 0    Current Allergies     Allergies as of 2021 - Reviewed 2021   Allergen Reaction Noted    Diphenhydramine Hyperactivity 2017    Gabapentin Eye Swelling and Rash 11/15/2017            The following portions of the patient's history were reviewed and updated as appropriate: allergies, current medications, past family history, past medical history, past social history, past surgical history and problem list      Past Medical History:   Diagnosis Date    Chronic pain disorder     Colon polyp     Disorder of rotator cuff, right     Hypertension     Low back pain     PONV (postoperative nausea and vomiting)        Past Surgical History:   Procedure Laterality Date    BREAST BIOPSY Right      SECTION      COLONOSCOPY  2021    6 MONTH RECOMMENDED= LOW-GRADE DYSPLASIA    TONSILLECTOMY      TUBAL LIGATION         Family History   Problem Relation Age of Onset    Congenital heart disease Mother     Heart attack Mother         atrial     Congenital heart disease Father     No Known Problems Sister     No Known Problems Daughter     No Known Problems Maternal Grandmother     No Known Problems Maternal Grandfather     No Known Problems Paternal Grandmother     No Known Problems Paternal Grandfather     No Known Problems Sister     No Known Problems Sister          Medications have been verified  Objective   /84 (BP Location: Right arm, Patient Position: Sitting, Cuff Size: Adult)   Pulse 74   Temp 98 1 °F (36 7 °C) (Tympanic)   Resp 16   SpO2 97%   No LMP recorded  Patient is postmenopausal        Physical Exam     Physical Exam  Vitals reviewed  Constitutional:       Appearance: Normal appearance  She is well-developed  HENT:      Head: Normocephalic and atraumatic  Cardiovascular:      Rate and Rhythm: Normal rate and regular rhythm     Pulmonary:      Effort: Pulmonary effort is normal    Abdominal:      General: Bowel sounds are normal       Palpations: Abdomen is soft  Tenderness: There is no abdominal tenderness  There is no right CVA tenderness, left CVA tenderness, guarding or rebound  Musculoskeletal:         General: Normal range of motion  Skin:     General: Skin is warm and dry  Capillary Refill: Capillary refill takes less than 2 seconds  Neurological:      General: No focal deficit present  Mental Status: She is alert and oriented to person, place, and time     Psychiatric:         Mood and Affect: Mood normal          Behavior: Behavior normal

## 2021-08-17 NOTE — PATIENT INSTRUCTIONS
Take Macrobid as prescribed  Take with food  Increase water intake  Follow up with your PCP or return to the clinic if symptoms worsen or persist more than 1-3 days  Urinary Tract Infection in Women   WHAT YOU NEED TO KNOW:   A urinary tract infection (UTI) is caused by bacteria that get inside your urinary tract  Most bacteria that enter your urinary tract come out when you urinate  If the bacteria stay in your urinary tract, you may get an infection  Your urinary tract includes your kidneys, ureters, bladder, and urethra  Urine is made in your kidneys, and it flows from the ureters to the bladder  Urine leaves the bladder through the urethra  A UTI is more common in your lower urinary tract, which includes your bladder and urethra  DISCHARGE INSTRUCTIONS:   Seek care immediately if:   · You are urinating very little or not at all  · You have a high fever with shaking chills  · You have side or back pain that gets worse  Call your doctor if:   · You have a fever  · You do not feel better after 2 days of taking antibiotics  · You are vomiting  · You have questions or concerns about your condition or care  Medicines:   · Antibiotics  help fight a bacterial infection  If you have UTIs often (called recurrent UTIs), you may be given antibiotics to take regularly  You will be given directions for when and how to use antibiotics  The goal is to prevent UTIs but not cause antibiotic resistance by using antibiotics too often  · Medicines  may be given to decrease pain and burning when you urinate  They will also help decrease the feeling that you need to urinate often  These medicines will make your urine orange or red  · Take your medicine as directed  Contact your healthcare provider if you think your medicine is not helping or if you have side effects  Tell him or her if you are allergic to any medicine  Keep a list of the medicines, vitamins, and herbs you take   Include the amounts, and when and why you take them  Bring the list or the pill bottles to follow-up visits  Carry your medicine list with you in case of an emergency  Follow up with your healthcare provider as directed:  Write down your questions so you remember to ask them during your visits  Prevent another UTI:   · Empty your bladder often  Urinate and empty your bladder as soon as you feel the need  Do not hold your urine for long periods of time  · Wipe from front to back after you urinate or have a bowel movement  This will help prevent germs from getting into your urinary tract through your urethra  · Drink liquids as directed  Ask how much liquid to drink each day and which liquids are best for you  You may need to drink more liquids than usual to help flush out the bacteria  Do not drink alcohol, caffeine, or citrus juices  These can irritate your bladder and increase your symptoms  Your healthcare provider may recommend cranberry juice to help prevent a UTI  · Urinate after you have sex  This can help flush out bacteria passed during sex  · Do not douche or use feminine deodorants  These can change the chemical balance in your vagina  · Change sanitary pads or tampons often  This will help prevent germs from getting into your urinary tract  · Talk to your healthcare provider about your birth control method  You may need to change your method if it is increasing your risk for UTIs  · Wear cotton underwear and clothes that are loose  Tight pants and nylon underwear can trap moisture and cause bacteria to grow  · Vaginal estrogen may be recommended  This medicine helps prevent UTIs in women who have gone through menopause or are in rafat-menopause  · Do pelvic muscle exercises often  Pelvic muscle exercises may help you start and stop urinating  Strong pelvic muscles may help you empty your bladder easier   Squeeze these muscles tightly for 5 seconds like you are trying to hold back urine  Then relax for 5 seconds  Gradually work up to squeezing for 10 seconds  Do 3 sets of 15 repetitions a day, or as directed  © Copyright [employer] 2021 Information is for End User's use only and may not be sold, redistributed or otherwise used for commercial purposes  All illustrations and images included in CareNotes® are the copyrighted property of [employer]  or Adrianne Madrigal   The above information is an  only  It is not intended as medical advice for individual conditions or treatments  Talk to your doctor, nurse or pharmacist before following any medical regimen to see if it is safe and effective for you

## 2021-08-17 NOTE — ASSESSMENT & PLAN NOTE
History and exam findings consistent with cystitis  Urine dip revealed trace leukocytes  Urine culture sent to the lab  Reasons to follow up reviewed with pt

## 2021-08-18 LAB — BACTERIA UR CULT: NORMAL

## 2021-08-19 ENCOUNTER — TELEPHONE (OUTPATIENT)
Dept: URGENT CARE | Facility: CLINIC | Age: 66
End: 2021-08-19

## 2021-08-19 DIAGNOSIS — N30.90 CYSTITIS: Primary | ICD-10-CM

## 2021-08-23 NOTE — TELEPHONE ENCOUNTER
Spoke with pt and notified her of urine culture results  Pt states symptoms are now resolved, she will complete the antibiotics as prescribed  Recommend pt f/u if symptoms return, no further questions

## 2021-08-25 ENCOUNTER — APPOINTMENT (OUTPATIENT)
Dept: LAB | Facility: HOSPITAL | Age: 66
End: 2021-08-25
Payer: COMMERCIAL

## 2021-08-25 DIAGNOSIS — R00.2 PALPITATIONS: ICD-10-CM

## 2021-08-25 DIAGNOSIS — M46.1 SACROILIITIS (HCC): ICD-10-CM

## 2021-08-25 LAB
BASOPHILS # BLD AUTO: 0.01 THOUSANDS/ΜL (ref 0–0.1)
BASOPHILS NFR BLD AUTO: 0 % (ref 0–1)
EOSINOPHIL # BLD AUTO: 0.06 THOUSAND/ΜL (ref 0–0.61)
EOSINOPHIL NFR BLD AUTO: 1 % (ref 0–6)
ERYTHROCYTE [DISTWIDTH] IN BLOOD BY AUTOMATED COUNT: 12.1 % (ref 11.6–15.1)
HBV SURFACE AG SER QL: NORMAL
HCT VFR BLD AUTO: 40.6 % (ref 34.8–46.1)
HCV AB SER QL: NORMAL
HGB BLD-MCNC: 13.2 G/DL (ref 11.5–15.4)
IMM GRANULOCYTES # BLD AUTO: 0.02 THOUSAND/UL (ref 0–0.2)
IMM GRANULOCYTES NFR BLD AUTO: 0 % (ref 0–2)
LYMPHOCYTES # BLD AUTO: 1.36 THOUSANDS/ΜL (ref 0.6–4.47)
LYMPHOCYTES NFR BLD AUTO: 28 % (ref 14–44)
MCH RBC QN AUTO: 32.1 PG (ref 26.8–34.3)
MCHC RBC AUTO-ENTMCNC: 32.5 G/DL (ref 31.4–37.4)
MCV RBC AUTO: 99 FL (ref 82–98)
MONOCYTES # BLD AUTO: 0.41 THOUSAND/ΜL (ref 0.17–1.22)
MONOCYTES NFR BLD AUTO: 8 % (ref 4–12)
NEUTROPHILS # BLD AUTO: 3.09 THOUSANDS/ΜL (ref 1.85–7.62)
NEUTS SEG NFR BLD AUTO: 63 % (ref 43–75)
NRBC BLD AUTO-RTO: 0 /100 WBCS
PLATELET # BLD AUTO: 210 THOUSANDS/UL (ref 149–390)
PMV BLD AUTO: 9.6 FL (ref 8.9–12.7)
RBC # BLD AUTO: 4.11 MILLION/UL (ref 3.81–5.12)
TSH SERPL DL<=0.05 MIU/L-ACNC: 1.36 UIU/ML (ref 0.36–3.74)
WBC # BLD AUTO: 4.95 THOUSAND/UL (ref 4.31–10.16)

## 2021-08-25 PROCEDURE — 84443 ASSAY THYROID STIM HORMONE: CPT

## 2021-08-25 PROCEDURE — 86803 HEPATITIS C AB TEST: CPT

## 2021-08-25 PROCEDURE — 36415 COLL VENOUS BLD VENIPUNCTURE: CPT

## 2021-08-25 PROCEDURE — 85025 COMPLETE CBC W/AUTO DIFF WBC: CPT

## 2021-08-25 PROCEDURE — 87340 HEPATITIS B SURFACE AG IA: CPT

## 2021-08-25 PROCEDURE — 86480 TB TEST CELL IMMUN MEASURE: CPT

## 2021-08-25 PROCEDURE — 81374 HLA I TYPING 1 ANTIGEN LR: CPT

## 2021-08-26 LAB
GAMMA INTERFERON BACKGROUND BLD IA-ACNC: 0.04 IU/ML
M TB IFN-G BLD-IMP: NEGATIVE
M TB IFN-G CD4+ BCKGRND COR BLD-ACNC: 0 IU/ML
M TB IFN-G CD4+ BCKGRND COR BLD-ACNC: 0 IU/ML
MITOGEN IGNF BCKGRD COR BLD-ACNC: >10 IU/ML

## 2021-08-27 ENCOUNTER — TELEPHONE (OUTPATIENT)
Dept: ADMINISTRATIVE | Facility: OTHER | Age: 66
End: 2021-08-27

## 2021-08-30 ENCOUNTER — VBI (OUTPATIENT)
Dept: ADMINISTRATIVE | Facility: OTHER | Age: 66
End: 2021-08-30

## 2021-08-30 NOTE — TELEPHONE ENCOUNTER
08/30/21 3:31 PM     See documentation in the VB [employer] SmartForm       [facility] [place]

## 2021-09-01 LAB — HLA-B27 QL NAA+PROBE: NEGATIVE

## 2021-10-05 ENCOUNTER — OFFICE VISIT (OUTPATIENT)
Dept: URGENT CARE | Facility: CLINIC | Age: 66
End: 2021-10-05

## 2021-10-05 VITALS
DIASTOLIC BLOOD PRESSURE: 96 MMHG | OXYGEN SATURATION: 98 % | SYSTOLIC BLOOD PRESSURE: 146 MMHG | RESPIRATION RATE: 16 BRPM | TEMPERATURE: 97.7 F | HEART RATE: 84 BPM

## 2021-10-05 DIAGNOSIS — J02.9 ACUTE PHARYNGITIS, UNSPECIFIED ETIOLOGY: Primary | ICD-10-CM

## 2021-10-05 LAB — S PYO AG THROAT QL: NEGATIVE

## 2021-10-05 PROCEDURE — 87070 CULTURE OTHR SPECIMN AEROBIC: CPT | Performed by: NURSE PRACTITIONER

## 2021-10-05 RX ORDER — BENZONATATE 100 MG/1
100 CAPSULE ORAL 3 TIMES DAILY PRN
Qty: 20 CAPSULE | Refills: 0 | Status: SHIPPED | OUTPATIENT
Start: 2021-10-05 | End: 2021-10-07 | Stop reason: SDUPTHER

## 2021-10-07 ENCOUNTER — TELEPHONE (OUTPATIENT)
Dept: URGENT CARE | Facility: CLINIC | Age: 66
End: 2021-10-07

## 2021-10-07 ENCOUNTER — TELEMEDICINE (OUTPATIENT)
Dept: FAMILY MEDICINE CLINIC | Facility: CLINIC | Age: 66
End: 2021-10-07
Payer: COMMERCIAL

## 2021-10-07 DIAGNOSIS — J06.9 UPPER RESPIRATORY TRACT INFECTION, UNSPECIFIED TYPE: Primary | ICD-10-CM

## 2021-10-07 PROCEDURE — 99213 OFFICE O/P EST LOW 20 MIN: CPT | Performed by: NURSE PRACTITIONER

## 2021-10-07 PROCEDURE — 1036F TOBACCO NON-USER: CPT | Performed by: NURSE PRACTITIONER

## 2021-10-07 RX ORDER — BENZONATATE 100 MG/1
100 CAPSULE ORAL 3 TIMES DAILY PRN
Qty: 20 CAPSULE | Refills: 0 | Status: SHIPPED | OUTPATIENT
Start: 2021-10-07 | End: 2022-01-11

## 2021-10-07 RX ORDER — AMOXICILLIN 500 MG/1
500 CAPSULE ORAL EVERY 12 HOURS SCHEDULED
Qty: 20 CAPSULE | Refills: 0 | Status: SHIPPED | OUTPATIENT
Start: 2021-10-07 | End: 2021-10-17

## 2021-10-08 LAB — BACTERIA THROAT CULT: NORMAL

## 2021-12-29 ENCOUNTER — VBI (OUTPATIENT)
Dept: ADMINISTRATIVE | Facility: OTHER | Age: 66
End: 2021-12-29

## 2022-01-05 DIAGNOSIS — F51.01 PRIMARY INSOMNIA: ICD-10-CM

## 2022-01-07 RX ORDER — ZOLPIDEM TARTRATE 10 MG/1
10 TABLET ORAL
Qty: 7 TABLET | Refills: 0 | Status: SHIPPED | OUTPATIENT
Start: 2022-01-07 | End: 2022-01-11 | Stop reason: SDUPTHER

## 2022-01-11 ENCOUNTER — OFFICE VISIT (OUTPATIENT)
Dept: FAMILY MEDICINE CLINIC | Facility: CLINIC | Age: 67
End: 2022-01-11
Payer: COMMERCIAL

## 2022-01-11 VITALS
RESPIRATION RATE: 16 BRPM | WEIGHT: 151 LBS | DIASTOLIC BLOOD PRESSURE: 80 MMHG | HEART RATE: 80 BPM | HEIGHT: 66 IN | BODY MASS INDEX: 24.27 KG/M2 | SYSTOLIC BLOOD PRESSURE: 134 MMHG

## 2022-01-11 DIAGNOSIS — F51.01 PRIMARY INSOMNIA: ICD-10-CM

## 2022-01-11 DIAGNOSIS — M46.1 SACROILIITIS (HCC): Primary | ICD-10-CM

## 2022-01-11 DIAGNOSIS — M48.061 SPINAL STENOSIS OF LUMBAR REGION WITHOUT NEUROGENIC CLAUDICATION: ICD-10-CM

## 2022-01-11 DIAGNOSIS — Z13.220 SCREENING FOR LIPID DISORDERS: ICD-10-CM

## 2022-01-11 DIAGNOSIS — Z12.31 ENCOUNTER FOR SCREENING MAMMOGRAM FOR BREAST CANCER: ICD-10-CM

## 2022-01-11 DIAGNOSIS — I10 PRIMARY HYPERTENSION: ICD-10-CM

## 2022-01-11 PROCEDURE — 3075F SYST BP GE 130 - 139MM HG: CPT | Performed by: NURSE PRACTITIONER

## 2022-01-11 PROCEDURE — 3079F DIAST BP 80-89 MM HG: CPT | Performed by: NURSE PRACTITIONER

## 2022-01-11 PROCEDURE — 3008F BODY MASS INDEX DOCD: CPT | Performed by: NURSE PRACTITIONER

## 2022-01-11 PROCEDURE — 1036F TOBACCO NON-USER: CPT | Performed by: NURSE PRACTITIONER

## 2022-01-11 PROCEDURE — 3725F SCREEN DEPRESSION PERFORMED: CPT | Performed by: NURSE PRACTITIONER

## 2022-01-11 PROCEDURE — 99214 OFFICE O/P EST MOD 30 MIN: CPT | Performed by: NURSE PRACTITIONER

## 2022-01-11 RX ORDER — ZOLPIDEM TARTRATE 10 MG/1
10 TABLET ORAL
Qty: 30 TABLET | Refills: 0 | Status: CANCELLED | OUTPATIENT
Start: 2022-01-11

## 2022-01-11 RX ORDER — ZOLPIDEM TARTRATE 10 MG/1
10 TABLET ORAL
Qty: 30 TABLET | Refills: 2 | Status: SHIPPED | OUTPATIENT
Start: 2022-01-11 | End: 2022-04-12

## 2022-01-11 RX ORDER — NAPROXEN 500 MG/1
500 TABLET ORAL 2 TIMES DAILY WITH MEALS
Qty: 60 TABLET | Refills: 0 | Status: SHIPPED | OUTPATIENT
Start: 2022-01-11 | End: 2022-02-03

## 2022-01-11 NOTE — ASSESSMENT & PLAN NOTE
Patient started on naproxen b i d  to help with this  Patient advised this medication should be taken with food to avoid GI upset  Patient also advised all other NSAIDs must be avoided while taking naproxen however, Tylenol can be taken as needed

## 2022-01-11 NOTE — PATIENT INSTRUCTIONS
Sciatica   WHAT YOU NEED TO KNOW:   Sciatica is a condition that causes pain along your sciatic nerve  The sciatic nerve runs from your spine through both sides of your buttocks  It then runs down the back of your thigh, into your lower leg and foot  Your sciatic nerve may be compressed, inflamed, irritated, or stretched  DISCHARGE INSTRUCTIONS:   Medicines:   · NSAIDs:  These medicines decrease swelling and pain  NSAIDs are available without a doctor's order  Ask your healthcare provider which medicine is right for you  Ask how much to take and when to take it  Take as directed  NSAIDs can cause stomach bleeding or kidney problems if not taken correctly  · Acetaminophen: This medicine decreases pain  Acetaminophen is available without a doctor's order  Ask how much to take and when to take it  Follow directions  Acetaminophen can cause liver damage if not taken correctly  · Muscle relaxers  help decrease pain and muscle spasms  · Take your medicine as directed  Contact your healthcare provider if you think your medicine is not helping or if you have side effects  Tell him of her if you are allergic to any medicine  Keep a list of the medicines, vitamins, and herbs you take  Include the amounts, and when and why you take them  Bring the list or the pill bottles to follow-up visits  Carry your medicine list with you in case of an emergency  Follow up with your doctor as directed:  Write down your questions so you remember to ask them during your visits  Manage your symptoms:   · Activity:  Decrease your activity  Do not lift heavy objects or twist your back for at least 6 weeks  Slowly return to your usual activity  · Ice:  Ice helps decrease swelling and pain  Ice may also help prevent tissue damage  Use an ice pack, or put crushed ice in a plastic bag  Cover it with a towel and place it on your low back or leg for 15 to 20 minutes every hour or as directed      · Heat:  Heat helps decrease pain and muscle spasms  Apply heat on the area for 20 to 30 minutes every 2 hours for as many days as directed  · Physical therapy:  You may need to see physical therapist to teach you exercises to help improve movement and strength, and to decrease pain  An occupational therapist teaches you skills to help with your daily activities  · Use assistive devices if directed: You may need to wear back support, such as a back brace  You may need crutches, a cane, or a walker to decrease stress on your lower back and leg muscles  Ask your healthcare provider for more information about assistive devices and how to use them correctly  Self-care:   · Avoid pressure on your back and legs:  Do not  lift heavy objects, or stand or sit for long periods of time  · Lift objects safely:  Keep your back straight and bend your knees when you  an object  Do not bend or twist your back when you lift  · Maintain a healthy weight:  Ask your healthcare provider how much you should weigh  Ask him to help you create a weight loss plan if you are overweight  · Exercise:  Ask your healthcare provider about the best stretching, warmup, and exercise plan for you  Contact your healthcare provider if:   · You have pain in your lower back at night or when resting  · You have pain in your lower back with numbness below the knee  · You have weakness in one leg only  · You have questions or concerns about your condition or care  Return to the emergency department if:   · You have trouble holding back your urine or bowel movements  · You have weakness in both legs  · You have numbness in your groin or buttocks  © Copyright [employer] 2021 Information is for End User's use only and may not be sold, redistributed or otherwise used for commercial purposes   All illustrations and images included in CareNotes® are the copyrighted property of [employer]  or Adrianne Madrigal   The above information is an  only  It is not intended as medical advice for individual conditions or treatments  Talk to your doctor, nurse or pharmacist before following any medical regimen to see if it is safe and effective for you

## 2022-01-11 NOTE — ASSESSMENT & PLAN NOTE
Patient started on naproxen b i d  to help with symptoms related to sacroiliitis  Patient advised this medication should be taken with food to avoid GI upset  Patient also advised all other NSAIDs must be avoided while taking naproxen however, Tylenol can be taken as needed

## 2022-01-11 NOTE — PROGRESS NOTES
Assessment and Plan:    Problem List Items Addressed This Visit        Cardiovascular and Mediastinum    Hypertension     Well controlled on current regimen  Relevant Orders    Basic metabolic panel    UA w Reflex to Microscopic w Reflex to Culture -Lab Collect       Musculoskeletal and Integument    Sacroiliitis ([address] ) - Primary     Patient started on naproxen b i d  to help with symptoms related to sacroiliitis  Patient advised this medication should be taken with food to avoid GI upset  Patient also advised all other NSAIDs must be avoided while taking naproxen however, Tylenol can be taken as needed  Relevant Medications    naproxen (Naprosyn) 500 mg tablet       Other    Spinal stenosis of lumbar region     Patient started on naproxen b i d  to help with this  Patient advised this medication should be taken with food to avoid GI upset  Patient also advised all other NSAIDs must be avoided while taking naproxen however, Tylenol can be taken as needed  Relevant Medications    naproxen (Naprosyn) 500 mg tablet    Primary insomnia     Well controlled with HS use of Ambien  Other Visit Diagnoses     Encounter for screening mammogram for breast cancer        Relevant Orders    Mammo screening bilateral w 3d & cad    Screening for lipid disorders        Relevant Orders    Lipid Panel with Direct LDL reflex                 Diagnoses and all orders for this visit:    Sacroiliitis (HCC)  -     naproxen (Naprosyn) 500 mg tablet; Take 1 tablet (500 mg total) by mouth 2 (two) times a day with meals    Primary insomnia    Encounter for screening mammogram for breast cancer  -     Mammo screening bilateral w 3d & cad; Future    Primary hypertension  -     Basic metabolic panel; Future  -     UA w Reflex to Microscopic w Reflex to Culture -Lab Collect; Future    Screening for lipid disorders  -     Lipid Panel with Direct LDL reflex;  Future    Spinal stenosis of lumbar region without neurogenic claudication  -     naproxen (Naprosyn) 500 mg tablet; Take 1 tablet (500 mg total) by mouth 2 (two) times a day with meals              Subjective:      Patient ID: Arik Mcallister is a 61 y o  female  CC:    Chief Complaint   Patient presents with    Medication Refill     pt here for a rx refill, pt wants meds for back pain  R Yung       HPI:    Hypertension:  Well controlled on current dosage of olmesartan  Patient denies any lightheadedness, dizziness, or orthostasis  Sacroiliitis/spinal stenosis of lumbar region:  Patient is currently prescribed diclofenac b i d  for this  Patient reports she is not currently taking the Diclofenac  Patient did previously have bilateral SI joint injections completed most recently in 2019 and did previously follow with pain management for this  Patient is having pain in her bilateral lower back  The patient reports some radiation of the pain to her groin area but denies any radiculopathy symptoms or radiation of the pain in to her buttocks or down her legs  Patient has had imaging performed in the past which has showed spinal stenosis in her lumbar spine  Patient is not currently interested in seeing pain management again at this time or seeing PT  Insomnia:  Well controlled with HS use of Ambien  The following portions of the patient's history were reviewed and updated as appropriate: allergies, current medications, past family history, past medical history, past social history, past surgical history and problem list       Review of Systems   Constitutional: Negative for chills and fever  HENT: Negative for ear pain and sore throat  Eyes: Negative for pain and visual disturbance  Respiratory: Negative for cough, chest tightness, shortness of breath and wheezing  Cardiovascular: Negative for chest pain, palpitations and leg swelling  Gastrointestinal: Negative for abdominal pain, constipation, diarrhea, nausea and vomiting     Endocrine: Negative for cold intolerance and heat intolerance  Genitourinary: Negative for decreased urine volume, dysuria and hematuria  Musculoskeletal: Positive for back pain (bilateral lower back)  Negative for arthralgias and myalgias  Skin: Negative for color change and rash  Allergic/Immunologic: Negative for environmental allergies  Neurological: Negative for dizziness, seizures, syncope, weakness, light-headedness, numbness and headaches  Hematological: Negative for adenopathy  Psychiatric/Behavioral: Negative for confusion  The patient is not nervous/anxious  All other systems reviewed and are negative  Data to review:       Objective:    Vitals:    01/11/22 0900   BP: 134/80   BP Location: Left arm   Patient Position: Sitting   Cuff Size: Standard   Pulse: 80   Resp: 16   Weight: 68 5 kg (151 lb)   Height: 5' 6" (1 676 m)        Physical Exam  Vitals and nursing note reviewed  Constitutional:       General: She is not in acute distress  Appearance: Normal appearance  She is well-developed  She is not ill-appearing  HENT:      Head: Normocephalic and atraumatic  Eyes:      Conjunctiva/sclera: Conjunctivae normal    Cardiovascular:      Rate and Rhythm: Normal rate and regular rhythm  Pulses: Normal pulses  Carotid pulses are 2+ on the right side and 2+ on the left side  Posterior tibial pulses are 2+ on the right side and 2+ on the left side  Heart sounds: Normal heart sounds  No murmur heard  Pulmonary:      Effort: Pulmonary effort is normal  No respiratory distress  Breath sounds: Normal breath sounds  No wheezing or rhonchi  Abdominal:      General: Abdomen is flat  Bowel sounds are normal  There is no distension  Palpations: Abdomen is soft  Tenderness: There is no abdominal tenderness  There is no guarding  Musculoskeletal:         General: Normal range of motion  Cervical back: Normal range of motion and neck supple  Lumbar back: Tenderness present  No swelling, edema, spasms or bony tenderness  Normal range of motion  Negative right straight leg raise test and negative left straight leg raise test       Right lower leg: No edema  Left lower leg: No edema  Comments: Patient has full range of motion of lumbar spine but does have noted pain with movement  No pain with palpation of midline lumbar spine the patient does report pain with palpation of bilateral latissimus dorsi area immediately below her ribcage  Patient also reports that this pain radiates into her bilateral groin area  Straight leg raise test was negative bilaterally  Patient also denied any pain with palpation of SI joints bilaterally  Skin:     General: Skin is warm and dry  Capillary Refill: Capillary refill takes less than 2 seconds  Neurological:      General: No focal deficit present  Mental Status: She is alert and oriented to person, place, and time  Psychiatric:         Mood and Affect: Mood normal          Behavior: Behavior normal          Thought Content: Thought content normal          Judgment: Judgment normal              Depression Screening and Follow-up Plan: Patient was screened for depression during today's encounter  They screened negative with a PHQ-2 score of 0  BMI Counseling: Body mass index is 24 37 kg/m²  The BMI is above normal  Nutrition recommendations include reducing portion sizes, decreasing overall calorie intake, 3-5 servings of fruits/vegetables daily and reducing fast food intake  Exercise recommendations include exercising 3-5 times per week and joining a gym

## 2022-01-13 ENCOUNTER — APPOINTMENT (OUTPATIENT)
Dept: URGENT CARE | Facility: CLINIC | Age: 67
End: 2022-01-13

## 2022-01-13 DIAGNOSIS — Z23 NEEDS FLU SHOT: Primary | ICD-10-CM

## 2022-02-02 ENCOUNTER — TELEPHONE (OUTPATIENT)
Dept: OBGYN CLINIC | Facility: HOSPITAL | Age: 67
End: 2022-02-02

## 2022-02-02 ENCOUNTER — TELEMEDICINE (OUTPATIENT)
Dept: RHEUMATOLOGY | Facility: CLINIC | Age: 67
End: 2022-02-02
Payer: COMMERCIAL

## 2022-02-02 DIAGNOSIS — M47.819 SPONDYLOARTHRITIS: Primary | ICD-10-CM

## 2022-02-02 PROCEDURE — 99213 OFFICE O/P EST LOW 20 MIN: CPT | Performed by: INTERNAL MEDICINE

## 2022-02-02 PROCEDURE — 1036F TOBACCO NON-USER: CPT | Performed by: INTERNAL MEDICINE

## 2022-02-02 NOTE — PATIENT INSTRUCTIONS
I ordered an anti-inflammatory medication, meloxicam for you to take daily with food for the next 2-4 weeks instead of the naproxen  You can also try over the counter Salonpas lidocaine patches and apply 1-3 patches to your painful lower back and leave on for 12 hours  I also ordered some non-fasting blood work for you to get done at your leisure  I'll have you come back to the office for a follow up visit in 2-3 months or sooner if necessary

## 2022-02-02 NOTE — PROGRESS NOTES
Virtual Brief Visit    Patient is located in the following state in which I hold an active license [place]      Assessment/Plan:    Problem List Items Addressed This Visit     None      Visit Diagnoses     Spondyloarthritis    -  Primary          Recent Visits  No visits were found meeting these conditions  Showing recent visits within past 7 days and meeting all other requirements  Today's Visits  Date Type Provider Dept   02/02/22 Telemedicine Irasema Jackson, [address] today's visits and meeting all other requirements  Future Appointments  No visits were found meeting these conditions  Showing future appointments within next 150 days and meeting all other requirements         I spent 20 minutes directly with the patient during this visit   Assessment and Plan:   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  OTC topical analgesics  PT for LS spine  Repeat esr/crp  Anti-inflammatory diet/exercise  Discussed anti-TNF alpha treatment in the future  Hep serologies and Quantiferon TB negative  F/u in 3 mos  to re-eval symptoms and discussion of further treatment options  Rheumatic Disease Summary  As above    Here for f/u virtual visit  Worsening LBP and stiffness  Also c/o bilateral hip pain  Not much improvement with naproxen  Will repeat esr/crp  Discussed changing naproxen to meloxicam and using topical analgesics (lidocaine patches) and patient understands and agrees  The following portions of the patient's history were reviewed and updated as appropriate: allergies, current medications, past family history, past medical history, past social history, past surgical history and problem list     Review of Systems:   Review of Systems   Constitutional: Negative for fatigue  HENT: Negative for mouth sores  Eyes: Negative for pain  Respiratory: Negative for shortness of breath  Cardiovascular: Negative for leg swelling     Musculoskeletal: Positive for arthralgias and back pain  Negative for joint swelling  Skin: Negative for rash  Neurological: Negative for weakness  Hematological: Negative for adenopathy  Psychiatric/Behavioral: Negative for sleep disturbance  Home Medications:    Current Outpatient Medications:     aspirin (ECOTRIN LOW STRENGTH) 81 mg EC tablet, Take 81 mg by mouth daily, Disp: , Rfl:     meloxicam (Mobic) 15 mg tablet, Take 1 tablet (15 mg total) by mouth daily, Disp: 30 tablet, Rfl: 1    naproxen (Naprosyn) 500 mg tablet, Take 1 tablet (500 mg total) by mouth 2 (two) times a day with meals, Disp: 60 tablet, Rfl: 0    olmesartan (BENICAR) 40 mg tablet, TAKE 1 TABLET BY MOUTH EVERY DAY, Disp: 90 tablet, Rfl: 1    zolpidem (AMBIEN) 10 mg tablet, Take 1 tablet (10 mg total) by mouth daily at bedtime as needed for sleep, Disp: 30 tablet, Rfl: 2    Objective: There were no vitals filed for this visit  Physical Exam  Constitutional:       General: She is not in acute distress  HENT:      Head: Normocephalic and atraumatic  Eyes:      Conjunctiva/sclera: Conjunctivae normal    Pulmonary:      Effort: Pulmonary effort is normal  No respiratory distress  Musculoskeletal:      Cervical back: Neck supple  Skin:     Coloration: Skin is not pale  Neurological:      Mental Status: She is alert  Mental status is at baseline  Psychiatric:         Mood and Affect: Mood normal          Behavior: Behavior normal          Reviewed labs and imaging  Imaging:   No results found      Labs:   Office Visit on 10/05/2021   Component Date Value Ref Range Status     RAPID STREP A 10/05/2021 Negative  Negative Final    Throat Culture 10/05/2021 Negative for beta-hemolytic Streptococcus   Final   Appointment on 08/25/2021   Component Date Value Ref Range Status    WBC 08/25/2021 4 95  4 31 - 10 16 Thousand/uL Final    RBC 08/25/2021 4 11  3 81 - 5 12 Million/uL Final    Hemoglobin 08/25/2021 13 2  11 5 - 15 4 g/dL Final    Hematocrit 08/25/2021 40 6  34 8 - 46 1 % Final    MCV 08/25/2021 99* 82 - 98 fL Final    MCH 08/25/2021 32 1  26 8 - 34 3 pg Final    MCHC 08/25/2021 32 5  31 4 - 37 4 g/dL Final    RDW 08/25/2021 12 1  11 6 - 15 1 % Final    MPV 08/25/2021 9 6  8 9 - 12 7 fL Final    Platelets 35/37/8650 210  149 - 390 Thousands/uL Final    nRBC 08/25/2021 0  /100 WBCs Final    Neutrophils Relative 08/25/2021 63  43 - 75 % Final    Immat GRANS % 08/25/2021 0  0 - 2 % Final    Lymphocytes Relative 08/25/2021 28  14 - 44 % Final    Monocytes Relative 08/25/2021 8  4 - 12 % Final    Eosinophils Relative 08/25/2021 1  0 - 6 % Final    Basophils Relative 08/25/2021 0  0 - 1 % Final    Neutrophils Absolute 08/25/2021 3 09  1 85 - 7 62 Thousands/µL Final    Immature Grans Absolute 08/25/2021 0 02  0 00 - 0 20 Thousand/uL Final    Lymphocytes Absolute 08/25/2021 1 36  0 60 - 4 47 Thousands/µL Final    Monocytes Absolute 08/25/2021 0 41  0 17 - 1 22 Thousand/µL Final    Eosinophils Absolute 08/25/2021 0 06  0 00 - 0 61 Thousand/µL Final    Basophils Absolute 08/25/2021 0 01  0 00 - 0 10 Thousands/µL Final    TSH 3RD GENERATON 08/25/2021 1 362  0 358 - 3 740 uIU/mL Final    The recommended reference ranges for TSH during pregnancy are as follows:   First trimester 0 1 to 2 5 uIU/mL   Second trimester  0 2 to 3 0 uIU/mL   Third trimester 0 3 to 3 0 uIU/m    Note: Normal ranges may not apply to patients who are transgender, non-binary, or whose legal sex, sex at birth, and gender identity differ   HLA B27 08/25/2021 Negative   Final    HLA-B*27 Negative  B27 allele interpretation for all loci based on IMGT/HLA  database version 3 44  This test was developed and its performance characteristics  determined by [facility]   It has not been cleared or approved  by the Food and Drug Administration    [facility] CLIA ID Number [id number]  This test was performed using PCR (Polymerase Chain Reaction)/SSOP  (Sequence Specific Oligonucleotide Probes) technique  SBT (Sequence  Based Typing) and/or SSP (Sequence Specific Primers) may be used as  supplemental methods when necessary  Please contact [facility] Customer  Service at [phone] if you have any questions  Director of [facility]   Dr Jarred Nelson, PhD    QFT Nil 08/25/2021 0 04  0 - 8 0 IU/ml Final    QFT TB1-NIL 08/25/2021 0 00  IU/ml Final    QFT TB2-NIL 08/25/2021 0 00  IU/ml Final    QFT Mitogen-NIL 08/25/2021 >10 00  IU/ml Final    QFT Final Interpretation 08/25/2021 Negative  Negative Final    No Interferon-gamma response to M  tuberculosis antigens detected  Infection with M  tuberculosis is unlikely  A single negative result does not exclude infection with M  tuberculosis  In patients at high risk for M  tuberculosis infection, a second test should be considered in accordance with the 2017 ATS/IDSA/CDC Clinical Practice Guidelines for Diagnosis of Tuberculosis in Adults and Children  False negative results can be a result of incorrect blood sample collection or handling of the specimen affecting lymphocyte function      Hepatitis B Surface Ag 08/25/2021 Non-reactive  Non-reactive, NonReactive - Confirmed Final    Hepatitis C Ab 08/25/2021 Non-reactive  Non-reactive Final   Office Visit on 08/17/2021   Component Date Value Ref Range Status    LEUKOCYTE ESTERASE,UA 08/17/2021 trace   Final    NITRITE,UA 08/17/2021 negative   Final    SL AMB POCT UROBILINOGEN 08/17/2021 negative   Final    POCT URINE PROTEIN 08/17/2021 negative   Final     PH,UA 08/17/2021 5   Final    BLOOD,UA 08/17/2021 negative   Final    SPECIFIC GRAVITY,UA 08/17/2021 1 010   Final    KETONES,UA 08/17/2021 negative   Final    BILIRUBIN,UA 08/17/2021 negative   Final    GLUCOSE, UA 08/17/2021 negative   Final     COLOR,UA 08/17/2021 yellow   Final    CLARITY,UA 08/17/2021 clear   Final    Urine Culture 08/17/2021 <10,000 cfu/ml    Final    [facility] Outpatient Visit on 03/16/2021   Component Date Value Ref Range Status    Protocol Name 03/16/2021 [facility]   Final    Time In Exercise Phase 03/16/2021 00:06:00   Final    MAX   SYSTOLIC BP 56/82/2977 038  mmHg Final    Max Diastolic Bp 66/64/3832 220  mmHg Final    Max Heart Rate 03/16/2021 157  BPM Final    Max Predicted Heart Rate 03/16/2021 157  BPM Final    Reason for Termination 03/16/2021 Dyspnea   Final    Test Indication 03/16/2021 Chest pain, palpitations   Final    Target Hr Formular 03/16/2021 (220 - Age)*85%   Final    Chest Pain Statement 03/16/2021 none   Final

## 2022-02-02 NOTE — TELEPHONE ENCOUNTER
Patient calls in wanting to be seen asap for a follow-up with Dr Jameson Espino in [address] [place] [address]  She stopped taking her meds and she is experiencing pain again  I looked for available appointments, but did not have anything to offer her until 3/28  I did see a 15 minute slot for 3/7 at 3;45, but follow-ups are 30 minutes  Please advise if the patient coul be put in the 15 minute slot or if she can be seen sooner than 3/28 for a follow-up         Patient's cb # [phone]

## 2022-02-03 ENCOUNTER — APPOINTMENT (OUTPATIENT)
Dept: LAB | Facility: CLINIC | Age: 67
End: 2022-02-03
Payer: COMMERCIAL

## 2022-02-03 DIAGNOSIS — M48.061 SPINAL STENOSIS OF LUMBAR REGION WITHOUT NEUROGENIC CLAUDICATION: ICD-10-CM

## 2022-02-03 DIAGNOSIS — I10 PRIMARY HYPERTENSION: ICD-10-CM

## 2022-02-03 DIAGNOSIS — Z13.220 SCREENING FOR LIPID DISORDERS: ICD-10-CM

## 2022-02-03 DIAGNOSIS — M47.819 SPONDYLOARTHROPATHY: ICD-10-CM

## 2022-02-03 DIAGNOSIS — M46.1 SACROILIITIS (HCC): ICD-10-CM

## 2022-02-03 PROCEDURE — 82306 VITAMIN D 25 HYDROXY: CPT

## 2022-02-03 PROCEDURE — 36415 COLL VENOUS BLD VENIPUNCTURE: CPT

## 2022-02-03 PROCEDURE — 86140 C-REACTIVE PROTEIN: CPT

## 2022-02-03 PROCEDURE — 84443 ASSAY THYROID STIM HORMONE: CPT

## 2022-02-03 PROCEDURE — 85652 RBC SED RATE AUTOMATED: CPT

## 2022-02-03 RX ORDER — NAPROXEN 500 MG/1
TABLET ORAL
Qty: 60 TABLET | Refills: 5 | Status: SHIPPED | OUTPATIENT
Start: 2022-02-03 | End: 2022-07-19

## 2022-02-04 LAB
25(OH)D3 SERPL-MCNC: 10.8 NG/ML (ref 30–100)
CRP SERPL QL: <3 MG/L
ERYTHROCYTE [SEDIMENTATION RATE] IN BLOOD: 6 MM/HOUR (ref 0–29)
TSH SERPL DL<=0.05 MIU/L-ACNC: 0.87 UIU/ML (ref 0.36–3.74)

## 2022-02-07 DIAGNOSIS — E55.9 VITAMIN D DEFICIENCY: Primary | ICD-10-CM

## 2022-02-07 RX ORDER — ERGOCALCIFEROL (VITAMIN D2) 1250 MCG
50000 CAPSULE ORAL WEEKLY
Qty: 12 CAPSULE | Refills: 1 | Status: SHIPPED | OUTPATIENT
Start: 2022-02-07 | End: 2022-08-07

## 2022-03-04 ENCOUNTER — TELEPHONE (OUTPATIENT)
Dept: OBGYN CLINIC | Facility: HOSPITAL | Age: 67
End: 2022-03-04

## 2022-03-04 ENCOUNTER — TELEPHONE (OUTPATIENT)
Dept: FAMILY MEDICINE CLINIC | Facility: CLINIC | Age: 67
End: 2022-03-04

## 2022-03-04 NOTE — TELEPHONE ENCOUNTER
Patient called she wanted to know if Dr Monroe Lozano would call in cough medicine to [facility] on [address]  She did not know the name of it but said Dr Monroe Lozano would know what it was she gave it to her before  She has a head cold but now she has this cough that keeps her up at night   You may reach her at [phone]

## 2022-03-05 ENCOUNTER — TELEMEDICINE (OUTPATIENT)
Dept: FAMILY MEDICINE CLINIC | Facility: CLINIC | Age: 67
End: 2022-03-05
Payer: COMMERCIAL

## 2022-03-05 VITALS — WEIGHT: 148 LBS | HEIGHT: 66 IN | BODY MASS INDEX: 23.78 KG/M2

## 2022-03-05 DIAGNOSIS — J06.9 UPPER RESPIRATORY TRACT INFECTION, UNSPECIFIED TYPE: ICD-10-CM

## 2022-03-05 DIAGNOSIS — R05.9 COUGH: Primary | ICD-10-CM

## 2022-03-05 PROCEDURE — 99213 OFFICE O/P EST LOW 20 MIN: CPT | Performed by: FAMILY MEDICINE

## 2022-03-05 PROCEDURE — 1036F TOBACCO NON-USER: CPT | Performed by: FAMILY MEDICINE

## 2022-03-05 PROCEDURE — 3008F BODY MASS INDEX DOCD: CPT | Performed by: FAMILY MEDICINE

## 2022-03-05 RX ORDER — AZITHROMYCIN 250 MG/1
TABLET, FILM COATED ORAL
Qty: 6 TABLET | Refills: 0 | Status: SHIPPED | OUTPATIENT
Start: 2022-03-05 | End: 2022-03-09

## 2022-03-05 RX ORDER — GUAIFENESIN AND CODEINE PHOSPHATE 100; 10 MG/5ML; MG/5ML
5 SOLUTION ORAL 3 TIMES DAILY PRN
Qty: 120 ML | Refills: 0 | Status: SHIPPED | OUTPATIENT
Start: 2022-03-05 | End: 2022-07-19

## 2022-03-05 NOTE — PROGRESS NOTES
Virtual Regular Visit    Verification of patient location:    Patient is located in the following state in which I hold an active license [place]      Assessment/Plan:    Problem List Items Addressed This Visit        Respiratory    Upper respiratory tract infection    Relevant Medications    guaifenesin-codeine (GUAIFENESIN AC) 100-10 MG/5ML liquid    azithromycin (ZITHROMAX) 250 mg tablet      Other Visit Diagnoses     Cough    -  Primary    Relevant Medications    guaifenesin-codeine (GUAIFENESIN AC) 100-10 MG/5ML liquid    azithromycin (ZITHROMAX) 250 mg tablet               Reason for visit is   Chief Complaint   Patient presents with    Virtual Brief Visit     patient c/o a runny nose, sore throat, PND x 6 days  2 days ago patient started with a productive cough  Patient is taking left over prescribed cough medicine  and Tylenol  ak    Virtual Regular Visit        Encounter provider Corrina Reyes MD    Provider located at [address]  [address] [address] [address] [phone]      Recent Visits  Date Type Provider Dept   03/04/22 Telephone Corrina Reyes MD [address] [place] [address] Primary Care   Showing recent visits within past 7 days and meeting all other requirements  Today's Visits  Date Type Provider Dept   03/05/22 Telemedicine Corrina Reyes MD [facility] Primary Care   Showing today's visits and meeting all other requirements  Future Appointments  No visits were found meeting these conditions  Showing future appointments within next 150 days and meeting all other requirements       The patient was identified by name and date of birth  Izabel Jaeger was informed that this is a telemedicine visit and that the visit is being conducted through [address] [place] [address] Now and patient was informed that this is a secure, HIPAA-compliant platform  She agrees to proceed     My office door was closed  No one else was in the room    She acknowledged consent and understanding of privacy and security of the video platform  The patient has agreed to participate and understands they can discontinue the visit at any time  Patient is aware this is a billable service  Subjective  Lyubov Leonardo is a 61 y o  female       4 day hx cough  Runny nose,mucus clear, no fever, no change taste/smell, no exposure to covid       Past Medical History:   Diagnosis Date    Chronic pain disorder     Colon polyp     Disorder of rotator cuff, right     Hypertension     Low back pain     PONV (postoperative nausea and vomiting)        Past Surgical History:   Procedure Laterality Date    BREAST BIOPSY Right      SECTION      COLONOSCOPY  2021    6 MONTH RECOMMENDED= LOW-GRADE DYSPLASIA    TONSILLECTOMY      TUBAL LIGATION         Current Outpatient Medications   Medication Sig Dispense Refill    aspirin (ECOTRIN LOW STRENGTH) 81 mg EC tablet Take 81 mg by mouth daily      ergocalciferol (Drisdol) 1 25 MG (83483 UT) capsule Take 1 capsule (50,000 Units total) by mouth once a week 12 capsule 1    meloxicam (Mobic) 15 mg tablet Take 1 tablet (15 mg total) by mouth daily 30 tablet 1    olmesartan (BENICAR) 40 mg tablet TAKE 1 TABLET BY MOUTH EVERY DAY 90 tablet 1    zolpidem (AMBIEN) 10 mg tablet Take 1 tablet (10 mg total) by mouth daily at bedtime as needed for sleep 30 tablet 2    azithromycin (ZITHROMAX) 250 mg tablet 2 1st day, 1/d for 4 days 6 tablet 0    guaifenesin-codeine (GUAIFENESIN AC) 100-10 MG/5ML liquid Take 5 mL by mouth 3 (three) times a day as needed for cough 120 mL 0    naproxen (NAPROSYN) 500 mg tablet TAKE 1 TABLET BY MOUTH TWICE A DAY WITH MEALS 60 tablet 5     No current facility-administered medications for this visit  Allergies   Allergen Reactions    Diphenhydramine Hyperactivity    Gabapentin Eye Swelling and Rash       Review of Systems   Constitutional: Negative for activity change, appetite change and fatigue     HENT: Positive for congestion, postnasal drip, rhinorrhea, sore throat and voice change  Negative for ear pain, sinus pressure and sinus pain  Respiratory: Positive for cough  Negative for shortness of breath  Neurological: Positive for headaches  Negative for dizziness  Hematological: Negative for adenopathy  Video Exam    Vitals:    03/05/22 0914   Weight: 67 1 kg (148 lb)   Height: 5' 6" (1 676 m)       Physical Exam  Vitals reviewed  Constitutional:       Appearance: Normal appearance  HENT:      Nose: Congestion and rhinorrhea present  Eyes:      General:         Right eye: Discharge present  Left eye: No discharge  Pulmonary:      Effort: Pulmonary effort is normal  No respiratory distress  Lymphadenopathy:      Cervical: No cervical adenopathy  Neurological:      Mental Status: She is alert  I spent 8 minutes directly with the patient during this visit    VIRTUAL VISIT DISCLAIMER      Herson Momin verbally agrees to participate in [employer]  Pt is aware that [employer] could be limited without vital signs or the ability to perform a full hands-on physical exam  Sj Browne understands she or the provider may request at any time to terminate the video visit and request the patient to seek care or treatment in person

## 2022-03-07 NOTE — TELEPHONE ENCOUNTER
Patient would like to get back to Dr Xochilt Abel  She's satisfied with the medication, no further action needed except to continue taking this medication (Meloxicam)

## 2022-03-07 NOTE — TELEPHONE ENCOUNTER
Patient was called and a voice message left telling her we could not change her appointment time due to another appointment following hers, and to call the office if she would like to change her appointment to another day

## 2022-03-12 DIAGNOSIS — M47.819 SPONDYLOARTHROPATHY: ICD-10-CM

## 2022-03-14 RX ORDER — MELOXICAM 15 MG/1
15 TABLET ORAL DAILY
Qty: 30 TABLET | Refills: 1 | Status: SHIPPED | OUTPATIENT
Start: 2022-03-14 | End: 2022-06-07

## 2022-04-12 DIAGNOSIS — F51.01 PRIMARY INSOMNIA: ICD-10-CM

## 2022-04-12 RX ORDER — ZOLPIDEM TARTRATE 10 MG/1
TABLET ORAL
Qty: 30 TABLET | Refills: 2 | Status: SHIPPED | OUTPATIENT
Start: 2022-04-12 | End: 2022-07-07

## 2022-04-14 ENCOUNTER — OFFICE VISIT (OUTPATIENT)
Dept: CARDIOLOGY CLINIC | Facility: CLINIC | Age: 67
End: 2022-04-14
Payer: COMMERCIAL

## 2022-04-14 VITALS
SYSTOLIC BLOOD PRESSURE: 120 MMHG | WEIGHT: 148.8 LBS | BODY MASS INDEX: 23.91 KG/M2 | HEIGHT: 66 IN | HEART RATE: 77 BPM | DIASTOLIC BLOOD PRESSURE: 82 MMHG

## 2022-04-14 DIAGNOSIS — R00.2 PALPITATIONS: ICD-10-CM

## 2022-04-14 DIAGNOSIS — I10 PRIMARY HYPERTENSION: Primary | ICD-10-CM

## 2022-04-14 PROCEDURE — 99213 OFFICE O/P EST LOW 20 MIN: CPT

## 2022-04-14 PROCEDURE — 3074F SYST BP LT 130 MM HG: CPT

## 2022-04-14 PROCEDURE — 3008F BODY MASS INDEX DOCD: CPT

## 2022-04-14 PROCEDURE — 1036F TOBACCO NON-USER: CPT

## 2022-04-14 PROCEDURE — 3079F DIAST BP 80-89 MM HG: CPT

## 2022-04-14 PROCEDURE — 93000 ELECTROCARDIOGRAM COMPLETE: CPT

## 2022-04-14 NOTE — PROGRESS NOTES
Cardiology   Majorie Jubilee, MD Lavetta Opitz, MD Ellie Parrot, DO, [facility] MD Danisha Hewitt DO, Mckenna Mcneil DO, [facility] [place]  -------------------------------------------------------------------  [facility]  [address], [place] [id number] [phone]  [phone]  04/14/22  Brit Matta  YOB: 1955   MRN: [id number]      Referring Physician: Tyrell Domingo MD  [address]  [address] [place] [id number]     HPI: Brit Matta is a 74 y o  female with:  Hypertension, family history of premature coronary disease with multiple first-degree relatives having been diagnosed with coronary artery disease in their 46s who presents today for follow-up        At her initial evaluation she noted some symptoms of intermittent palpitations occur at rest   She also noted associated substernal chest pressure and mild shortness of breath      Echo showed EF 60 percent, grade 1 diastolic dysfunction, trace mitral regurgitation, mild tricuspid regurgitation  Stress test was equivocal for ischemia, however probably negative  There were upsloping ST segment changes which resolved within 1 minutes into recovery   Holter monitor did show 8 episodes of nonsustained SVT, 2 of which she was symptomatic from        She states that she continues to have intermittent episodes of palpitations  These have become more frequent  Review of Systems   Constitutional: Negative for chills and fever  HENT: Negative for facial swelling and sore throat  Eyes: Negative for visual disturbance  Respiratory: Negative for cough, chest tightness, shortness of breath and wheezing  Cardiovascular: Positive for palpitations  Negative for chest pain and leg swelling  Gastrointestinal: Negative for abdominal pain, blood in stool, constipation, diarrhea, nausea and vomiting  Endocrine: Negative for cold intolerance and heat intolerance     Genitourinary: Negative for decreased urine volume, difficulty urinating, dysuria and hematuria  Musculoskeletal: Negative for arthralgias, back pain and myalgias  Skin: Negative for rash  Neurological: Negative for dizziness, syncope, weakness and numbness  Psychiatric/Behavioral: Negative for agitation, behavioral problems and confusion  The patient is nervous/anxious  OBJECTIVE  Vitals:    04/14/22 1530   BP: 120/82   Pulse: 77       Physical Exam  Constitutional: awake, alert and oriented, in no acute distress, no obvious deformities  Head: Normocephalic, without obvious abnormality, atraumatic  Eyes: conjunctivae clear and moist  Sclera anicteric  No xanthelasmas  Pupils equal bilaterally  Extraocular motions are full  Ear nose mouth and throat: ears are symmetrical bilaterally, hearing appears to be equal bilaterally, no nasal discharge or epistaxis, oropharynx is clear with moist mucous membranes  Neck:  Trachea is midline, neck is supple, no thyromegaly or significant lymphadenopathy, there is full range of motion  Lungs: clear to auscultation bilaterally, no wheezes, no rales, no rhonchi, no accessory muscle use, breathing is nonlabored  Heart: regular rate and rhythm, S1, S2 normal, no murmur, no click, rub or gallop, no lower extremity edema  Abdomen: soft, non-tender; bowel sounds normal; no masses,  no organomegaly  Psychiatric:  Patient is oriented to time, place, person, mood/affect is negative for depression, anxiety, agitation, appears to have appropriate insight  Skin: Skin is warm, dry, intact  No obvious rashes or lesions on exposed extremities  Nail beds are pink with no cyanosis or clubbing        EKG:  Results for orders placed or performed in visit on 04/14/22   POCT ECG    Impression    Normal sinus rhythm with nonspecific changes and possible septal infarct pattern        IMPRESSION:  Palpitations Zio patch with nonsustained SVT - very short episodes  Substernal chest pressure , atypical, stress test negative for ischemia  Dizziness   Hypertension      DISCUSSION/RECOMMENDATIONS:  She presents today for follow-up  She is having recurrent palpitations  She states she is actively having symptoms right now as I was examining her  Her ECG today is with a normal rhythm and no ectopic beats or SVT  As I am listening to her today she states she is having symptoms of palpitations right now as we speak but her rhythm is normal, regular with a heart rate at 70 beats per minute  She does note she has been under increased stress with her  being sick   She also notes that she does not get good sleep, would consider sleep evaluation to rule out sleep apnea however she does not want to pursue this at this time  Her symptoms may be secondary to anxiety  Doubt these are all secondary to intrinsic cardiac cause as she is actively having symptoms today in the office and has been shown to have normal sinus rhythm with a normal heart rate with this  Her blood pressure is stable   Would continue with observation for now and plan for follow-up with her PCP to discuss possible medical therapy for anxiety    Jem Kauffman DO, [facility] [place]  --------------------------------------------------------------------------------  TREADMILL STRESS  Results for orders placed during the hospital encounter of 21    Stress test only, exercise    Narrative  [address] [place] [address], [address] [facility]  [phone]    Stress Electrocardiography during Exercise    Name: Lily Tariq  MR #: [id number]  Account #: [de-identified]  Study date: 2021  : 1955  Age: 63 years  Gender: Female  Height: 64 in  Weight: 147 lb  BSA: 1 72 mï¾²    Allergies: DIPHENHYDRAMINE, GABAPENTIN    Diagnosis: I10  - Essential (primary) hypertension, R00 2 - Palpitations, R07 9 - Chest pain, unspecified    Primary Physician:  Kana Silva  Referring Physician:  MICHAELLE Quevedo   RN:  Salvador Drake HEIDI Kingston  GROUP:  [facility]  Interpreting Physician:  MICHAELLE Jiménez  Report Prepared By[de-identified]  India Tan RN    CLINICAL QUESTION: Detection of coronary artery disease  HISTORY: Pt currently wearing zio monitor patch  The patient is a 63year old  female  Chest pain status: chest pain  Other symptoms: dyspnea and palpitations  Coronary artery disease risk factors: hypertension, former smoking,  and family history of premature coronary artery disease  Cardiovascular history: none significant  Medications: include an ACE inhibitor/ARB and aspirin  PHYSICAL EXAM: Baseline physical exam screening: no wheezes audible, + heart murmur    REST ECG: Normal sinus rhythm  PROCEDURE: Treadmill exercise testing was performed, using the Josué protocol  Stress electrocardiographic evaluation was performed  JOSUÉ PROTOCOL:  HR bpm SBP mmHg DBP mmHg Symptoms  Baseline 72 140 96 none  Stage 1 127 150 90 none  Stage 2 157 160 96 dyspnea, fatigue  Immediate 157 -- -- same as above  Recovery 1 134 160 100 same as above  Recovery 2 104 -- -- subsiding  Recovery 3 97 140 96 subsiding  Recovery 5 95 136 90 none  No medications or fluids given  STRESS SUMMARY: Baseline O2 sat 100%, Peak O2 sat 94% Duration of exercise was 6 min and 0 sec  The patient exercised to protocol stage 2  Maximal work rate was 7 METs  Maximal heart rate during stress was 157 bpm ( 100 % of maximal  predicted heart rate)  The heart rate response to stress was normal  There was resting hypertension with a blunted blood pressure response to stress  The rate-pressure product for the peak heart rate and blood pressure was 37686  There was  no chest pain during stress  The stress test was terminated due to dyspnea and fatigue  The stress ECG was equivocal for ischemia   The patient developed 1 mm horizontal to upsloping ST segment depressions at peak exercise in II, III, aVF,  V4-V6, which quickly resolved inside of 1 minute into recovery Arrhythmia during stress: isolated premature ventricular beats  SUMMARY:  -  Stress results: Duration of exercise was 6 min and 0 sec  Target heart rate was achieved  There was resting hypertension with a blunted blood pressure response to stress  There was no chest pain during stress  -  ECG conclusions: The stress ECG was equivocal for ischemia  The patient developed 1 mm horizontal to upsloping ST segment depressions at peak exercise in II, III, aVF, V4-V6, which quickly resolved inside of 1 minute into recovery    IMPRESSIONS: Equivocal study after maximal exercise  There were borderline EKG changes for ischemia at maximum exercise with 1mm horizontal to upsloping ST segment depressions in the inferior and lateral leads, which resolved completely  within 1 minute into recovery  Prepared and signed by    MICHAELLE Bosch  Signed 2021 16:42:59     ----------------------------------------------------------------------------------------------  NUCLEAR STRESS TEST: No results found for this or any previous visit  No results found for this or any previous visit       --------------------------------------------------------------------------------  CATH:  No results found for this or any previous visit     --------------------------------------------------------------------------------  ECHO:   Results for orders placed during the hospital encounter of 21    Echo complete with contrast if indicated    Narrative  [address] [place] [address] [address] [facility], [address]  [phone]    Transthoracic Echocardiogram  2D, M-mode, Doppler, and Color Doppler    Study date:  16-Mar-2021    Patient: Grayson Jacques  MR number: [id number]  Account number: [de-identified]  : 1955  Age: 63 years  Gender: Female  Status: Outpatient  Location: Echo lab  Height: 64 in  Weight: 146 7 lb  BP: 128/ 88 mmHg    Indications: shortness of breath, murmur  Diagnoses: R01 1 - Cardiac murmur, unspecified, R06 02 - Shortness of breath    Sonographer:  Simin Ravi RDCS  Primary Physician: Reagan Olivo MD  Referring Physician:  MICHAELLE Benson  Group:  [facility]  Interpreting Physician:  MICHAELLE Benson     SUMMARY    LEFT VENTRICLE:  Systolic function was normal by visual assessment  Ejection fraction was estimated to be 60 %  There were no regional wall motion abnormalities  Doppler parameters were consistent with abnormal left ventricular relaxation (grade 1 diastolic dysfunction)  MITRAL VALVE:  There was mild to moderate annular calcification  There was trace regurgitation  TRICUSPID VALVE:  There was mild regurgitation  Estimated peak PA pressure was 29 mmHg  IVC, HEPATIC VEINS:  The inferior vena cava was normal in size and course  Respirophasic changes were normal     SUMMARY MEASUREMENTS  2D measurements:  Unspecified Anatomy:   LAESV Index (A-L) was 24 5 ml/m2  LAESVInd MOD BP was 23 ml/m2  RAAs A4C was 10 2 cm2  RWT was 0 4    CW measurements:  Unspecified Anatomy:   TR Vmax was 2 5 m/s   TR maxPG was 25 8 mmHg  MM measurements:  Unspecified Anatomy:   TAPSE was 2 8 cm  PW measurements:  Unspecified Anatomy:   E' Avg was 0 1 m/s  E' Lat was 0 m/s  E' Sept was 0 1 m/s  E/E' Avg was 10 3   E/E' Lat was 14 1   E/E' Sept was 8 1   MV A Adeel was 0 7 m/s  MV Dec Daggett was 2 4 m/s2  MV DecT was 222 2 ms   MV E Adeel was 0 5  m/s  MV E/A Ratio was 0 7   TV E' lat was 0 1 m/s  HISTORY: PRIOR HISTORY: hypertension  PROCEDURE: The procedure was performed in the echo lab  This was a routine study  The transthoracic approach was used  The study included complete 2D imaging, M-mode, complete spectral Doppler, and color Doppler  The heart rate was 84 bpm,  at the start of the study  Images were obtained from the parasternal, apical, subcostal, and suprasternal notch acoustic windows  Echocardiographic views were limited due to lung interference  Image quality was adequate  LEFT VENTRICLE: Size was normal  Systolic function was normal by visual assessment  Ejection fraction was estimated to be 60 %  There were no regional wall motion abnormalities  Wall thickness was normal  DOPPLER: Doppler parameters were  consistent with abnormal left ventricular relaxation (grade 1 diastolic dysfunction)  RIGHT VENTRICLE: The size was normal  Systolic function was normal  Wall thickness was normal     LEFT ATRIUM: Size was normal     RIGHT ATRIUM: Size was normal     MITRAL VALVE: There was mild to moderate annular calcification  Valve structure was normal  There was mild thickening of the anterior leaflet  There was normal leaflet separation  DOPPLER: The transmitral velocity was within the normal  range  There was no evidence for stenosis  There was trace regurgitation  AORTIC VALVE: The valve was trileaflet  Leaflets exhibited normal thickness and normal cuspal separation  DOPPLER: Transaortic velocity was within the normal range  There was no evidence for stenosis  There was no regurgitation  TRICUSPID VALVE: The valve structure was normal  There was normal leaflet separation  DOPPLER: The transtricuspid velocity was within the normal range  There was no evidence for stenosis  There was mild regurgitation  Estimated peak PA  pressure was 29 mmHg  PULMONIC VALVE: Not well visualized  DOPPLER: There was no significant regurgitation  PERICARDIUM: There was no pericardial effusion  The pericardium was normal in appearance  AORTA: The root exhibited normal size  SYSTEMIC VEINS: IVC: The inferior vena cava was normal in size and course   Respirophasic changes were normal     SYSTEM MEASUREMENT TABLES    2D  %FS: 32 8 %  Ao Diam: 3 9 cm  EDV(Teich): 73 1 ml  EF(Teich): 61 8 %  ESV(Teich): 28 ml  IVSd: 1 cm  LA Diam: 3 5 cm  LAAs A2C: 14 7 cm2  LAAs A4C: 14 6 cm2  LAESV A-L A2C: 38 4 ml  LAESV A-L A4C: 41 8 ml  LAESV Index (A-L): 24 5 ml/m2  LAESV MOD A2C: 35 3 ml  LAESV MOD A4C: 41 ml  LAESV(A-L): 42 2 ml  LAESV(MOD BP): 39 5 ml  LAESVInd MOD BP: 23 ml/m2  LALs A2C: 4 8 cm  LALs A4C: 4 3 cm  LVIDd: 4 1 cm  LVIDs: 2 7 cm  LVPWd: 0 8 cm  RAAs A4C: 10 2 cm2  RAESV A-L: 21 1 ml  RAESV MOD: 22 2 ml  RALs: 4 2 cm  RVIDd: 2 cm  RWT: 0 4  SV(Teich): 45 2 ml    CW  TR Vmax: 2 5 m/s  TR maxP 8 mmHg    MM  TAPSE: 2 8 cm    PW  E' Av 1 m/s  E' Lat: 0 m/s  E' Sept: 0 1 m/s  E/E' Avg: 10 3  E/E' Lat: 14 1  E/E' Sept: 8 1  MV A Adeel: 0 7 m/s  MV Dec Dallam: 2 4 m/s2  MV DecT: 222 2 ms  MV E Adeel: 0 5 m/s  MV E/A Ratio: 0 7  TV E' lat: 0 1 m/s    [facility]    Prepared and electronically signed by    MICHAELLE Peralta  Signed 16-Mar-2021 16:56:32    No results found for this or any previous visit     --------------------------------------------------------------------------------  HOLTER  No results found for this or any previous visit      No results found for this or any previous visit     --------------------------------------------------------------------------------  CAROTIDS  No results found for this or any previous visit      --------------------------------------------------------------------------------  Diagnoses and all orders for this visit:    Primary hypertension  -     POCT ECG    Palpitations     ======================================================    Past Medical History:   Diagnosis Date    Chronic pain disorder     Colon polyp     Disorder of rotator cuff, right     Hypertension     Low back pain     PONV (postoperative nausea and vomiting)      Past Surgical History:   Procedure Laterality Date    BREAST BIOPSY Right      SECTION      COLONOSCOPY  2021    6 MONTH RECOMMENDED= LOW-GRADE DYSPLASIA    TONSILLECTOMY      TUBAL LIGATION           Medications  Current Outpatient Medications   Medication Sig Dispense Refill    aspirin (ECOTRIN LOW STRENGTH) 81 mg EC tablet Take 81 mg by mouth daily      ergocalciferol (Drisdol) 1 25 MG (99581 UT) capsule Take 1 capsule (50,000 Units total) by mouth once a week 12 capsule 1    meloxicam (MOBIC) 15 mg tablet TAKE 1 TABLET (15 MG TOTAL) BY MOUTH DAILY  30 tablet 1    olmesartan (BENICAR) 40 mg tablet TAKE 1 TABLET BY MOUTH EVERY DAY 90 tablet 1    zolpidem (AMBIEN) 10 mg tablet TAKE 1 TABLET BY MOUTH DAILY AT BEDTIME AS NEEDED FOR SLEEP 30 tablet 2    guaifenesin-codeine (GUAIFENESIN AC) 100-10 MG/5ML liquid Take 5 mL by mouth 3 (three) times a day as needed for cough (Patient not taking: Reported on 4/14/2022 ) 120 mL 0    naproxen (NAPROSYN) 500 mg tablet TAKE 1 TABLET BY MOUTH TWICE A DAY WITH MEALS (Patient not taking: Reported on 4/14/2022) 60 tablet 5     No current facility-administered medications for this visit  Allergies   Allergen Reactions    Diphenhydramine Hyperactivity    Gabapentin Eye Swelling and Rash       Social History     Socioeconomic History    Marital status: /Civil Union     Spouse name: Not on file    Number of children: 2    Years of education: Not on file    Highest education level: Not on file   Occupational History     Comment: full time employment     Occupation: clerical   Tobacco Use    Smoking status: Former Smoker     Packs/day: 0 10     Years: 10 00     Pack years: 1 00     Start date: 2008    Smokeless tobacco: Never Used   Vaping Use    Vaping Use: Never used   Substance and Sexual Activity    Alcohol use:  Yes     Alcohol/week: 10 0 standard drinks     Types: 10 Cans of beer per week     Comment: social    Drug use: No    Sexual activity: Yes     Partners: Male     Birth control/protection: Female Sterilization   Other Topics Concern    Not on file   Social History Narrative    Denied history of domestic violence    House    Lives with family    No advance directives    No living will    [facility]    Supportive and safe    3 grandchild, 2 children     Social Determinants of Health     Financial Resource Strain: Not on file   Food Insecurity: Not on file   Transportation Needs: Not on file   Physical Activity: Not on file   Stress: Not on file   Social Connections: Not on file   Intimate Partner Violence: Not on file   Housing Stability: Not on file        Family History   Problem Relation Age of Onset    Congenital heart disease Mother     Heart attack Mother         atrial     Congenital heart disease Father     No Known Problems Sister     No Known Problems Daughter     No Known Problems Maternal Grandmother     No Known Problems Maternal Grandfather     No Known Problems Paternal Grandmother     No Known Problems Paternal Grandfather     No Known Problems Sister     No Known Problems Sister        Lab Results   Component Value Date    WBC 4 95 08/25/2021    HGB 13 2 08/25/2021    HCT 40 6 08/25/2021    MCV 99 (H) 08/25/2021     08/25/2021      Lab Results   Component Value Date    SODIUM 138 04/24/2020    K 4 0 04/24/2020     04/24/2020    CO2 27 04/24/2020    BUN 18 04/24/2020    CREATININE 0 81 04/24/2020    GLUC 100 04/24/2020    CALCIUM 9 5 04/24/2020      Lab Results   Component Value Date    HGBA1C 5 5 04/24/2020      No results found for: CHOL  Lab Results   Component Value Date    HDL 91 01/07/2020     Lab Results   Component Value Date    LDLCALC 102 (H) 01/07/2020     Lab Results   Component Value Date    TRIG 79 01/07/2020     No results found for: CHOLHDL   No results found for: INR, PROTIME       Patient Active Problem List    Diagnosis Date Noted    Primary insomnia 04/02/2021    Palpitations 04/02/2021    Pain in the coccyx 12/28/2020    At risk for osteoporosis 12/28/2020    Positive colorectal cancer screening using Cologuard test 11/05/2020    Rotator cuff arthropathy of right shoulder 12/20/2019    Sacroiliitis ([address] ) 09/16/2019    Bilateral groin pain     Spinal stenosis of lumbar region 08/01/2017    Acute right-sided low back pain with right-sided sciatica 07/05/2017    Hypertension 05/10/2017    Upper respiratory tract infection 10/07/2021    Acute pharyngitis 10/05/2021    Cystitis 08/17/2021    Rhus dermatitis 07/26/2021       Portions of the record may have been created with voice recognition software  Occasional wrong word or "sound a like" substitutions may have occurred due to the inherent limitations of voice recognition software  Read the chart carefully and recognize, using context, where substitutions have occurred      Lamine Florian DO, [facility]  4/14/2022 4:08 PM

## 2022-06-07 DIAGNOSIS — M47.819 SPONDYLOARTHROPATHY: ICD-10-CM

## 2022-06-07 RX ORDER — MELOXICAM 15 MG/1
15 TABLET ORAL DAILY
Qty: 30 TABLET | Refills: 1 | Status: SHIPPED | OUTPATIENT
Start: 2022-06-07 | End: 2022-07-19

## 2022-06-10 DIAGNOSIS — I10 ESSENTIAL HYPERTENSION: ICD-10-CM

## 2022-06-10 RX ORDER — OLMESARTAN MEDOXOMIL 40 MG/1
TABLET ORAL
Qty: 90 TABLET | Refills: 1 | Status: SHIPPED | OUTPATIENT
Start: 2022-06-10

## 2022-06-15 ENCOUNTER — HOSPITAL ENCOUNTER (OUTPATIENT)
Dept: MAMMOGRAPHY | Facility: CLINIC | Age: 67
Discharge: HOME | End: 2022-06-15
Payer: COMMERCIAL

## 2022-06-15 DIAGNOSIS — Z12.31 ENCOUNTER FOR SCREENING MAMMOGRAM FOR BREAST CANCER: ICD-10-CM

## 2022-06-15 PROCEDURE — 77067 SCR MAMMO BI INCL CAD: CPT

## 2022-06-15 PROCEDURE — 77063 BREAST TOMOSYNTHESIS BI: CPT

## 2022-07-19 ENCOUNTER — OFFICE VISIT (OUTPATIENT)
Dept: FAMILY MEDICINE CLINIC | Facility: CLINIC | Age: 67
End: 2022-07-19
Payer: COMMERCIAL

## 2022-07-19 VITALS
BODY MASS INDEX: 24.11 KG/M2 | WEIGHT: 150 LBS | HEIGHT: 66 IN | OXYGEN SATURATION: 97 % | SYSTOLIC BLOOD PRESSURE: 124 MMHG | DIASTOLIC BLOOD PRESSURE: 82 MMHG | HEART RATE: 68 BPM

## 2022-07-19 DIAGNOSIS — M05.80 POLYARTHRITIS WITH POSITIVE RHEUMATOID FACTOR (HCC): Primary | ICD-10-CM

## 2022-07-19 DIAGNOSIS — E55.9 VITAMIN D DEFICIENCY: ICD-10-CM

## 2022-07-19 DIAGNOSIS — I10 PRIMARY HYPERTENSION: ICD-10-CM

## 2022-07-19 DIAGNOSIS — F51.01 PRIMARY INSOMNIA: ICD-10-CM

## 2022-07-19 PROBLEM — L25.5 RHUS DERMATITIS: Status: RESOLVED | Noted: 2021-07-26 | Resolved: 2022-07-19

## 2022-07-19 PROBLEM — R19.5 POSITIVE COLORECTAL CANCER SCREENING USING COLOGUARD TEST: Status: RESOLVED | Noted: 2020-11-05 | Resolved: 2022-07-19

## 2022-07-19 PROBLEM — J02.9 ACUTE PHARYNGITIS: Status: RESOLVED | Noted: 2021-10-05 | Resolved: 2022-07-19

## 2022-07-19 PROBLEM — N30.90 CYSTITIS: Status: RESOLVED | Noted: 2021-08-17 | Resolved: 2022-07-19

## 2022-07-19 PROBLEM — J06.9 UPPER RESPIRATORY TRACT INFECTION: Status: RESOLVED | Noted: 2021-10-07 | Resolved: 2022-07-19

## 2022-07-19 PROCEDURE — 1101F PT FALLS ASSESS-DOCD LE1/YR: CPT | Performed by: FAMILY MEDICINE

## 2022-07-19 PROCEDURE — 3079F DIAST BP 80-89 MM HG: CPT | Performed by: FAMILY MEDICINE

## 2022-07-19 PROCEDURE — 3074F SYST BP LT 130 MM HG: CPT | Performed by: FAMILY MEDICINE

## 2022-07-19 PROCEDURE — 3288F FALL RISK ASSESSMENT DOCD: CPT | Performed by: FAMILY MEDICINE

## 2022-07-19 PROCEDURE — 3725F SCREEN DEPRESSION PERFORMED: CPT | Performed by: FAMILY MEDICINE

## 2022-07-19 PROCEDURE — 99214 OFFICE O/P EST MOD 30 MIN: CPT | Performed by: FAMILY MEDICINE

## 2022-07-19 RX ORDER — TRAZODONE HYDROCHLORIDE 50 MG/1
50 TABLET ORAL
Qty: 30 TABLET | Refills: 5 | Status: SHIPPED | OUTPATIENT
Start: 2022-07-19 | End: 2022-08-12

## 2022-07-19 NOTE — PROGRESS NOTES
Assessment and Plan:    Problem List Items Addressed This Visit        Cardiovascular and Mediastinum    Hypertension     BP stable            Other    Primary insomnia     Add Trazodone         Relevant Medications    traZODone (DESYREL) 50 mg tablet    Vitamin D deficiency     Await lab         Relevant Orders    Vitamin D 25 hydroxy      Other Visit Diagnoses     Polyarthritis with positive rheumatoid factor ([facility] )    -  Primary    Relevant Orders    DXA bone density spine hip and pelvis    C-reactive protein    Sedimentation rate, automated    RF Screen w/ Reflex to Titer    CBC and differential                 Diagnoses and all orders for this visit:    Polyarthritis with positive rheumatoid factor ([facility] )  -     DXA bone density spine hip and pelvis; Future  -     C-reactive protein; Future  -     Sedimentation rate, automated; Future  -     RF Screen w/ Reflex to Titer; Future  -     CBC and differential; Future    Vitamin D deficiency  -     Vitamin D 25 hydroxy; Future    Primary hypertension    Primary insomnia  -     traZODone (DESYREL) 50 mg tablet; Take 1 tablet (50 mg total) by mouth daily at bedtime            Subjective:      Patient ID: Dennis Alcantara is a 74 y o  female  CC:    Chief Complaint   Patient presents with    Follow-up     Patient present today for her 6 month follow up  HPI:    Here for f/u  Of htn , feels okay, gets occ twinges of chest discomfort, also having back pain under rib cage, feels weak sometimes, still sleep issues  With anxiety at work      The following portions of the patient's history were reviewed and updated as appropriate: allergies, current medications, past family history, past medical history, past social history, past surgical history and problem list         Review of Systems   Constitutional: Positive for fatigue  Negative for activity change and appetite change  Respiratory: Negative for shortness of breath  Cardiovascular: Negative for chest pain  Musculoskeletal: Positive for back pain  Neurological: Positive for weakness  Negative for dizziness and headaches  Psychiatric/Behavioral: The patient is nervous/anxious  Data to review:       Objective:    Vitals:    07/19/22 1620   BP: 124/82   BP Location: Right arm   Patient Position: Sitting   Cuff Size: Large   Pulse: 68   SpO2: 97%   Weight: 68 kg (150 lb)   Height: 5' 6" (1 676 m)        Physical Exam  Vitals reviewed  Constitutional:       Appearance: Normal appearance  Cardiovascular:      Rate and Rhythm: Normal rate and regular rhythm  Pulses: Normal pulses  Heart sounds: Normal heart sounds  Pulmonary:      Effort: Pulmonary effort is normal       Breath sounds: Normal breath sounds  Musculoskeletal:      Right lower leg: No edema  Left lower leg: No edema  Lymphadenopathy:      Cervical: No cervical adenopathy  Neurological:      Mental Status: She is alert     Psychiatric:         Mood and Affect: Mood normal

## 2022-07-21 ENCOUNTER — APPOINTMENT (OUTPATIENT)
Dept: LAB | Facility: CLINIC | Age: 67
End: 2022-07-21
Payer: COMMERCIAL

## 2022-07-21 DIAGNOSIS — E55.9 VITAMIN D DEFICIENCY: ICD-10-CM

## 2022-07-21 DIAGNOSIS — M05.80 POLYARTHRITIS WITH POSITIVE RHEUMATOID FACTOR (HCC): ICD-10-CM

## 2022-07-21 LAB
25(OH)D3 SERPL-MCNC: 73 NG/ML (ref 30–100)
BASOPHILS # BLD AUTO: 0.02 THOUSANDS/ΜL (ref 0–0.1)
BASOPHILS NFR BLD AUTO: 0 % (ref 0–1)
CRP SERPL QL: <3 MG/L
EOSINOPHIL # BLD AUTO: 0.12 THOUSAND/ΜL (ref 0–0.61)
EOSINOPHIL NFR BLD AUTO: 3 % (ref 0–6)
ERYTHROCYTE [DISTWIDTH] IN BLOOD BY AUTOMATED COUNT: 12.3 % (ref 11.6–15.1)
ERYTHROCYTE [SEDIMENTATION RATE] IN BLOOD: 9 MM/HOUR (ref 0–29)
HCT VFR BLD AUTO: 40.2 % (ref 34.8–46.1)
HGB BLD-MCNC: 13.5 G/DL (ref 11.5–15.4)
IMM GRANULOCYTES # BLD AUTO: 0.02 THOUSAND/UL (ref 0–0.2)
IMM GRANULOCYTES NFR BLD AUTO: 0 % (ref 0–2)
LYMPHOCYTES # BLD AUTO: 1.45 THOUSANDS/ΜL (ref 0.6–4.47)
LYMPHOCYTES NFR BLD AUTO: 30 % (ref 14–44)
MCH RBC QN AUTO: 32.6 PG (ref 26.8–34.3)
MCHC RBC AUTO-ENTMCNC: 33.6 G/DL (ref 31.4–37.4)
MCV RBC AUTO: 97 FL (ref 82–98)
MONOCYTES # BLD AUTO: 0.38 THOUSAND/ΜL (ref 0.17–1.22)
MONOCYTES NFR BLD AUTO: 8 % (ref 4–12)
NEUTROPHILS # BLD AUTO: 2.81 THOUSANDS/ΜL (ref 1.85–7.62)
NEUTS SEG NFR BLD AUTO: 59 % (ref 43–75)
NRBC BLD AUTO-RTO: 0 /100 WBCS
PLATELET # BLD AUTO: 234 THOUSANDS/UL (ref 149–390)
PMV BLD AUTO: 9.7 FL (ref 8.9–12.7)
RBC # BLD AUTO: 4.14 MILLION/UL (ref 3.81–5.12)
WBC # BLD AUTO: 4.8 THOUSAND/UL (ref 4.31–10.16)

## 2022-07-21 PROCEDURE — 86430 RHEUMATOID FACTOR TEST QUAL: CPT

## 2022-07-21 PROCEDURE — 85652 RBC SED RATE AUTOMATED: CPT

## 2022-07-21 PROCEDURE — 86140 C-REACTIVE PROTEIN: CPT

## 2022-07-21 PROCEDURE — 36415 COLL VENOUS BLD VENIPUNCTURE: CPT

## 2022-07-21 PROCEDURE — 85025 COMPLETE CBC W/AUTO DIFF WBC: CPT

## 2022-07-21 PROCEDURE — 82306 VITAMIN D 25 HYDROXY: CPT

## 2022-07-22 DIAGNOSIS — M06.09 POLYARTHRITIS WITH NEGATIVE RHEUMATOID FACTOR (HCC): Primary | ICD-10-CM

## 2022-07-22 LAB — RHEUMATOID FACT SER QL LA: NEGATIVE

## 2022-08-05 DIAGNOSIS — E55.9 VITAMIN D DEFICIENCY: ICD-10-CM

## 2022-08-07 RX ORDER — ERGOCALCIFEROL 1.25 MG/1
CAPSULE ORAL
Qty: 4 CAPSULE | Refills: 5 | Status: SHIPPED | OUTPATIENT
Start: 2022-08-07

## 2022-10-10 DIAGNOSIS — F51.01 PRIMARY INSOMNIA: ICD-10-CM

## 2022-10-10 RX ORDER — ZOLPIDEM TARTRATE 10 MG/1
TABLET ORAL
Qty: 30 TABLET | Refills: 0 | Status: SHIPPED | OUTPATIENT
Start: 2022-10-10

## 2022-10-10 NOTE — TELEPHONE ENCOUNTER
Requested Prescriptions     Pending Prescriptions Disp Refills   • zolpidem (AMBIEN) 10 mg tablet [Pharmacy Med Name: ZOLPIDEM TARTRATE 10 MG TABLET] 30 tablet      Sig: TAKE 1 TABLET BY MOUTH DAILY AT BEDTIME AS NEEDED FOR SLEEP **7/9     LOV 7/19/22, F/U 1/31/23, Labs completed

## 2022-10-20 ENCOUNTER — APPOINTMENT (OUTPATIENT)
Dept: URGENT CARE | Facility: CLINIC | Age: 67
End: 2022-10-20

## 2022-10-20 DIAGNOSIS — Z23 NEEDS FLU SHOT: Primary | ICD-10-CM

## 2022-10-20 NOTE — PROGRESS NOTES
Flu shot only  High dose flu vaccine discussed with and recommended to pt due to her age  Pt verbalized understanding and states she would prefer the standard dose flu vaccine

## 2022-10-24 ENCOUNTER — OFFICE VISIT (OUTPATIENT)
Dept: CARDIOLOGY CLINIC | Facility: CLINIC | Age: 67
End: 2022-10-24
Payer: COMMERCIAL

## 2022-10-24 VITALS
BODY MASS INDEX: 24.46 KG/M2 | HEIGHT: 66 IN | DIASTOLIC BLOOD PRESSURE: 84 MMHG | HEART RATE: 72 BPM | SYSTOLIC BLOOD PRESSURE: 124 MMHG | WEIGHT: 152.2 LBS

## 2022-10-24 DIAGNOSIS — R00.2 PALPITATIONS: Primary | ICD-10-CM

## 2022-10-24 DIAGNOSIS — R07.89 CHEST TIGHTNESS: ICD-10-CM

## 2022-10-24 DIAGNOSIS — R06.02 SOB (SHORTNESS OF BREATH): ICD-10-CM

## 2022-10-24 DIAGNOSIS — I10 PRIMARY HYPERTENSION: ICD-10-CM

## 2022-10-24 PROCEDURE — 93000 ELECTROCARDIOGRAM COMPLETE: CPT

## 2022-10-24 PROCEDURE — 99214 OFFICE O/P EST MOD 30 MIN: CPT

## 2022-10-24 NOTE — PROGRESS NOTES
Cardiology   MD Piero Torres MD Elmarie Acron, DO, Duane Drum, FACP Ather Mansoor, MD Jonny Kid, DO, Zeyad Elizabeth DO, [facility]  -------------------------------------------------------------------  [facility]  [address], [place] [id number] [phone]  [phone]  10/24/22  Elver Castro  YOB: 1955   MRN: [id number]      Referring Physician: Radha Joseph MD  [address]  [address] [place] [id number]     HPI: Elver Castro is a 74 y o  female with:   Hypertension  Family history of premature coronary disease with multiple first-degree relatives have been diagnosed with coronary artery disease in their 61E  Grade 1 diastolic dysfunction  Trace mitral regurgitation   Mild tricuspid regurgitation   Eight episodes of nonsustained SVT on Holter monitor, 2 of which she was symptomatic from    She presents today for follow-up  She states she is having increased episodes of palpitations now, sometimes seen with lightheadedness but no syncope  She states that these episodes occur at rest, are not provoked by exertion or any type of strenuous activity  She denies any significant shortness of breath chest pain or chest pressure with exertion or activity  She states the episodes of palpitations will last for around 15-20 minutes at a time, and she does admit to feeling some symptoms right now however in the office for EKG shows normal sinus rhythm at 72 beats per minute    Review of Systems   Constitutional: Negative for chills and fever  HENT: Negative for facial swelling and sore throat  Eyes: Negative for visual disturbance  Respiratory: Negative for cough, chest tightness, shortness of breath and wheezing  Cardiovascular: Negative for chest pain, palpitations and leg swelling  Gastrointestinal: Negative for abdominal pain, blood in stool, constipation, diarrhea, nausea and vomiting     Endocrine: Negative for cold intolerance and heat intolerance  Genitourinary: Negative for decreased urine volume, difficulty urinating, dysuria and hematuria  Musculoskeletal: Negative for arthralgias, back pain and myalgias  Skin: Negative for rash  Neurological: Negative for dizziness, syncope, weakness and numbness  Psychiatric/Behavioral: Negative for agitation, behavioral problems and confusion  The patient is not nervous/anxious  OBJECTIVE  Vitals:    10/24/22 0827   BP: 124/84   Pulse: 72       Physical Exam  Constitutional: awake, alert and oriented, in no acute distress, no obvious deformities  Head: Normocephalic, without obvious abnormality, atraumatic  Eyes: conjunctivae clear and moist  Sclera anicteric  No xanthelasmas  Pupils equal bilaterally  Extraocular motions are full  Ear nose mouth and throat: ears are symmetrical bilaterally, hearing appears to be equal bilaterally, no nasal discharge or epistaxis, oropharynx is clear with moist mucous membranes  Neck:  Trachea is midline, neck is supple, no thyromegaly or significant lymphadenopathy, there is full range of motion  Lungs: clear to auscultation bilaterally, no wheezes, no rales, no rhonchi, no accessory muscle use, breathing is nonlabored  Heart: regular rate and rhythm, S1, S2 normal, no murmur, no click, no rub and no gallop, no lower extremity edema  Abdomen: soft, non-tender; bowel sounds normal; no masses,  no organomegaly  Psychiatric:  Patient is oriented to time, place, person, mood/affect is negative for depression, anxiety, agitation, appears to have appropriate insight  Skin: Skin is warm, dry, intact  No obvious rashes or lesions on exposed extremities  Nail beds are pink with no cyanosis or clubbing      EKG:  Results for orders placed or performed in visit on 10/24/22   POCT ECG    Impression    Normal sinus rhythm with low-voltage QRS but otherwise normal        IMPRESSION:  Palpitations, now increasing frequency  Hypertension  Family history of premature coronary disease with multiple first-degree relatives have been diagnosed with coronary artery disease in their 10Z  Grade 1 diastolic dysfunction  Trace mitral regurgitation   Mild tricuspid regurgitation   Eight episodes of nonsustained SVT on Holter monitor, 2 of which she was symptomatic from    DISCUSSION/RECOMMENDATIONS:  She notes increasing frequency of palpitations now for which she is symptomatic from   She had a Holter monitor where she did have some episodes of nonsustained SVT   Given her increasing frequency of symptoms, will elect to place on low-dose beta-blocker metoprolol tartrate twice a day to see if these improve her symptoms  Would hold off on further testing at this time, her symptoms not appear to be angina in nature  Plan for follow-up 3 months for re-evaluation    Taya Saldaña DO, [facility]  --------------------------------------------------------------------------------  TREADMILL STRESS  Results for orders placed during the hospital encounter of 21    Stress test only, exercise    Narrative  [address]  [place], [address]  [phone]    Stress Electrocardiography during Exercise    Name: Cony Crow  MR #: [id number]  Account #: [de-identified]  Study date: 2021  : 1955  Age: 63 years  Gender: Female  Height: 64 in  Weight: 147 lb  BSA: 1 72 mï¾²    Allergies: DIPHENHYDRAMINE, GABAPENTIN    Diagnosis: I10  - Essential (primary) hypertension, R00 2 - Palpitations, R07 9 - Chest pain, unspecified    Primary Physician:  Travis Gabriel  Referring Physician:  MICHAELLE Sanchez   RN:  Alex Deutsch RN  GROUP:  [facility]  Interpreting Physician:  MICHAELLE Sanchez  Report Prepared By[[email or web address]-identified]  Alex Deutsch RN    CLINICAL QUESTION: Detection of coronary artery disease  HISTORY: Pt currently wearing zio monitor patch   The patient is a 63 year old  female  Chest pain status: chest pain  Other symptoms: dyspnea and palpitations  Coronary artery disease risk factors: hypertension, former smoking,  and family history of premature coronary artery disease  Cardiovascular history: none significant  Medications: include an ACE inhibitor/ARB and aspirin  PHYSICAL EXAM: Baseline physical exam screening: no wheezes audible, + heart murmur    REST ECG: Normal sinus rhythm  PROCEDURE: Treadmill exercise testing was performed, using the Mariel protocol  Stress electrocardiographic evaluation was performed  MARIEL PROTOCOL:  HR bpm SBP mmHg DBP mmHg Symptoms  Baseline 72 140 96 none  Stage 1 127 150 90 none  Stage 2 157 160 96 dyspnea, fatigue  Immediate 157 -- -- same as above  Recovery 1 134 160 100 same as above  Recovery 2 104 -- -- subsiding  Recovery 3 97 140 96 subsiding  Recovery 5 95 136 90 none  No medications or fluids given  STRESS SUMMARY: Baseline O2 sat 100%, Peak O2 sat 94% Duration of exercise was 6 min and 0 sec  The patient exercised to protocol stage 2  Maximal work rate was 7 METs  Maximal heart rate during stress was 157 bpm ( 100 % of maximal  predicted heart rate)  The heart rate response to stress was normal  There was resting hypertension with a blunted blood pressure response to stress  The rate-pressure product for the peak heart rate and blood pressure was [id number]  There was  no chest pain during stress  The stress test was terminated due to dyspnea and fatigue  The stress ECG was equivocal for ischemia  The patient developed 1 mm horizontal to upsloping ST segment depressions at peak exercise in II, III, aVF,  V4-V6, which quickly resolved inside of 1 minute into recovery Arrhythmia during stress: isolated premature ventricular beats  SUMMARY:  -  Stress results: Duration of exercise was 6 min and 0 sec  Target heart rate was achieved  There was resting hypertension with a blunted blood pressure response to stress  There was no chest pain during stress  -  ECG conclusions: The stress ECG was equivocal for ischemia  The patient developed 1 mm horizontal to upsloping ST segment depressions at peak exercise in II, III, aVF, V4-V6, which quickly resolved inside of 1 minute into recovery    IMPRESSIONS: Equivocal study after maximal exercise  There were borderline EKG changes for ischemia at maximum exercise with 1mm horizontal to upsloping ST segment depressions in the inferior and lateral leads, which resolved completely  within 1 minute into recovery  Prepared and signed by    MICHAELLE Jenkins  Signed 2021 16:42:59     ----------------------------------------------------------------------------------------------  NUCLEAR STRESS TEST: No results found for this or any previous visit  No results found for this or any previous visit       --------------------------------------------------------------------------------  CATH:  No results found for this or any previous visit     --------------------------------------------------------------------------------  ECHO:   Results for orders placed during the hospital encounter of 21    Echo complete with contrast if indicated    Narrative  [address] [place], [place] [address], [address]  [phone]    Transthoracic Echocardiogram  2D, M-mode, Doppler, and Color Doppler    Study date:  16-Mar-2021    Patient: Jeanna Chew  MR number: [id number]  Account number: [de-identified]  : 1955  Age: 63 years  Gender: Female  Status: Outpatient  Location: Echo lab  Height: 64 in  Weight: 146 7 lb  BP: 128/ 88 mmHg    Indications: shortness of breath, murmur  Diagnoses: R01 1 - Cardiac murmur, unspecified, R06 02 - Shortness of breath    Sonographer:  Sarath Baez RDCS  Primary Physician: Meño Vaz MD  Referring Physician:  MICHAELLE Jenkins    Group:  [facility]  Interpreting Physician:  Denita Broderick MICHAELLE Peterson     SUMMARY    LEFT VENTRICLE:  Systolic function was normal by visual assessment  Ejection fraction was estimated to be 60 %  There were no regional wall motion abnormalities  Doppler parameters were consistent with abnormal left ventricular relaxation (grade 1 diastolic dysfunction)  MITRAL VALVE:  There was mild to moderate annular calcification  There was trace regurgitation  TRICUSPID VALVE:  There was mild regurgitation  Estimated peak PA pressure was 29 mmHg  IVC, HEPATIC VEINS:  The inferior vena cava was normal in size and course  Respirophasic changes were normal     SUMMARY MEASUREMENTS  2D measurements:  Unspecified Anatomy:   LAESV Index (A-L) was 24 5 ml/m2  LAESVInd MOD BP was 23 ml/m2  RAAs A4C was 10 2 cm2  RWT was 0 4    CW measurements:  Unspecified Anatomy:   TR Vmax was 2 5 m/s   TR maxPG was 25 8 mmHg  MM measurements:  Unspecified Anatomy:   TAPSE was 2 8 cm  PW measurements:  Unspecified Anatomy:   E' Avg was 0 1 m/s  E' Lat was 0 m/s  E' Sept was 0 1 m/s  E/E' Avg was 10 3   E/E' Lat was 14 1   E/E' Sept was 8 1   MV A Adeel was 0 7 m/s  MV Dec Deuel was 2 4 m/s2  MV DecT was 222 2 ms   MV E Adeel was 0 5  m/s  MV E/A Ratio was 0 7   TV E' lat was 0 1 m/s  HISTORY: PRIOR HISTORY: hypertension  PROCEDURE: The procedure was performed in the echo lab  This was a routine study  The transthoracic approach was used  The study included complete 2D imaging, M-mode, complete spectral Doppler, and color Doppler  The heart rate was 84 bpm,  at the start of the study  Images were obtained from the parasternal, apical, subcostal, and suprasternal notch acoustic windows  Echocardiographic views were limited due to lung interference  Image quality was adequate  LEFT VENTRICLE: Size was normal  Systolic function was normal by visual assessment  Ejection fraction was estimated to be 60 %  There were no regional wall motion abnormalities   Wall thickness was normal  DOPPLER: Doppler parameters were  consistent with abnormal left ventricular relaxation (grade 1 diastolic dysfunction)  RIGHT VENTRICLE: The size was normal  Systolic function was normal  Wall thickness was normal     LEFT ATRIUM: Size was normal     RIGHT ATRIUM: Size was normal     MITRAL VALVE: There was mild to moderate annular calcification  Valve structure was normal  There was mild thickening of the anterior leaflet  There was normal leaflet separation  DOPPLER: The transmitral velocity was within the normal  range  There was no evidence for stenosis  There was trace regurgitation  AORTIC VALVE: The valve was trileaflet  Leaflets exhibited normal thickness and normal cuspal separation  DOPPLER: Transaortic velocity was within the normal range  There was no evidence for stenosis  There was no regurgitation  TRICUSPID VALVE: The valve structure was normal  There was normal leaflet separation  DOPPLER: The transtricuspid velocity was within the normal range  There was no evidence for stenosis  There was mild regurgitation  Estimated peak PA  pressure was 29 mmHg  PULMONIC VALVE: Not well visualized  DOPPLER: There was no significant regurgitation  PERICARDIUM: There was no pericardial effusion  The pericardium was normal in appearance  AORTA: The root exhibited normal size  SYSTEMIC VEINS: IVC: The inferior vena cava was normal in size and course   Respirophasic changes were normal     SYSTEM MEASUREMENT TABLES    2D  %FS: 32 8 %  Ao Diam: 3 9 cm  EDV(Teich): 73 1 ml  EF(Teich): 61 8 %  ESV(Teich): 28 ml  IVSd: 1 cm  LA Diam: 3 5 cm  LAAs A2C: 14 7 cm2  LAAs A4C: 14 6 cm2  LAESV A-L A2C: 38 4 ml  LAESV A-L A4C: 41 8 ml  LAESV Index (A-L): 24 5 ml/m2  LAESV MOD A2C: 35 3 ml  LAESV MOD A4C: 41 ml  LAESV(A-L): 42 2 ml  LAESV(MOD BP): 39 5 ml  LAESVInd MOD BP: 23 ml/m2  LALs A2C: 4 8 cm  LALs A4C: 4 3 cm  LVIDd: 4 1 cm  LVIDs: 2 7 cm  LVPWd: 0 8 cm  RAAs A4C: 10 2 cm2  RAESV A-L: 21 1 ml  RAESV MOD: 22 2 ml  RALs: 4 2 cm  RVIDd: 2 cm  RWT: 0 4  SV(Teich): 45 2 ml    CW  TR Vmax: 2 5 m/s  TR maxP 8 mmHg    MM  TAPSE: 2 8 cm    PW  E' Av 1 m/s  E' Lat: 0 m/s  E' Sept: 0 1 m/s  E/E' Avg: 10 3  E/E' Lat: 14 1  E/E' Sept: 8 1  MV A Adeel: 0 7 m/s  MV Dec Kenedy: 2 4 m/s2  MV DecT: 222 2 ms  MV E Adeel: 0 5 m/s  MV E/A Ratio: 0 7  TV E' lat: 0 1 m/s    [facility] Accredited Echocardiography Laboratory    Prepared and electronically signed by    MICHAELLE Pollard  Signed 16-Mar-2021 16:56:32    No results found for this or any previous visit     --------------------------------------------------------------------------------  HOLTER  No results found for this or any previous visit  No results found for this or any previous visit     --------------------------------------------------------------------------------  CAROTIDS  No results found for this or any previous visit      --------------------------------------------------------------------------------  Diagnoses and all orders for this visit:    Palpitations  -     POCT ECG  -     metoprolol tartrate (LOPRESSOR) 25 mg tablet;  Take 1 tablet (25 mg total) by mouth every 12 (twelve) hours    SOB (shortness of breath)  -     POCT ECG    Chest tightness  -     POCT ECG    Primary hypertension     ======================================================    Past Medical History:   Diagnosis Date   • Chronic pain disorder    • Colon polyp    • Disorder of rotator cuff, right    • Hypertension    • Low back pain    • PONV (postoperative nausea and vomiting)      Past Surgical History:   Procedure Laterality Date   • BREAST BIOPSY Right    •  SECTION     • COLONOSCOPY  2021    6 MONTH RECOMMENDED= LOW-GRADE DYSPLASIA   • TONSILLECTOMY     • TUBAL LIGATION           Medications  Current Outpatient Medications   Medication Sig Dispense Refill   • aspirin (ECOTRIN LOW STRENGTH) 81 mg EC tablet Take 81 mg by mouth daily     • ergocalciferol (VITAMIN D2) 50,000 units TAKE 1 CAPSULE BY MOUTH ONE TIME PER WEEK 4 capsule 5   • metoprolol tartrate (LOPRESSOR) 25 mg tablet Take 1 tablet (25 mg total) by mouth every 12 (twelve) hours 60 tablet 3   • olmesartan (BENICAR) 40 mg tablet TAKE 1 TABLET BY MOUTH EVERY DAY 90 tablet 1   • traZODone (DESYREL) 50 mg tablet TAKE 1 TABLET BY MOUTH DAILY AT BEDTIME 90 tablet 1   • zolpidem (AMBIEN) 10 mg tablet TAKE 1 TABLET BY MOUTH DAILY AT BEDTIME AS NEEDED FOR SLEEP **7/9 30 tablet 0     No current facility-administered medications for this visit  Allergies   Allergen Reactions   • Diphenhydramine Hyperactivity   • Gabapentin Eye Swelling and Rash       Social History     Socioeconomic History   • Marital status: /Civil Union     Spouse name: Not on file   • Number of children: 2   • Years of education: Not on file   • Highest education level: Not on file   Occupational History     Comment: full time employment    • Occupation: clerical   Tobacco Use   • Smoking status: Former Smoker     Packs/day: 0 10     Years: 10 00     Pack years: 1 00     Start date: 2008   • Smokeless tobacco: Never Used   Vaping Use   • Vaping Use: Never used   Substance and Sexual Activity   • Alcohol use:  Yes     Alcohol/week: 10 0 standard drinks     Types: 10 Cans of beer per week     Comment: social   • Drug use: No   • Sexual activity: Yes     Partners: Male     Birth control/protection: Female Sterilization   Other Topics Concern   • Not on file   Social History Narrative    Denied history of domestic violence    House    Lives with family    No advance directives    No living will    Mandaeism    Supportive and safe    3 grandchild, 2 children     Social Determinants of Health     Financial Resource Strain: Not on file   Food Insecurity: Not on file   Transportation Needs: Not on file   Physical Activity: Not on file   Stress: Not on file   Social Connections: Not on file   Intimate Partner Violence: Not on file   Housing Stability: Not on file        Family History   Problem Relation Age of Onset   • Congenital heart disease Mother    • Heart attack Mother         atrial    • Congenital heart disease Father    • No Known Problems Sister    • No Known Problems Daughter    • No Known Problems Maternal Grandmother    • No Known Problems Maternal Grandfather    • No Known Problems Paternal Grandmother    • No Known Problems Paternal Grandfather    • No Known Problems Sister    • No Known Problems Sister        Lab Results   Component Value Date    WBC 4 80 07/21/2022    HGB 13 5 07/21/2022    HCT 40 2 07/21/2022    MCV 97 07/21/2022     07/21/2022      Lab Results   Component Value Date    SODIUM 138 04/24/2020    K 4 0 04/24/2020     04/24/2020    CO2 27 04/24/2020    BUN 18 04/24/2020    CREATININE 0 81 04/24/2020    GLUC 100 04/24/2020    CALCIUM 9 5 04/24/2020      Lab Results   Component Value Date    HGBA1C 5 5 04/24/2020      No results found for: CHOL  Lab Results   Component Value Date    HDL 91 01/07/2020     Lab Results   Component Value Date    LDLCALC 102 (H) 01/07/2020     Lab Results   Component Value Date    TRIG 79 01/07/2020     No results found for: CHOLHDL   No results found for: INR, PROTIME       Patient Active Problem List    Diagnosis Date Noted   • Primary insomnia 04/02/2021   • Palpitations 04/02/2021   • Pain in the coccyx 12/28/2020   • At risk for osteoporosis 12/28/2020   • Rotator cuff arthropathy of right shoulder 12/20/2019   • Sacroiliitis ([facility] [address] ) 09/16/2019   • Bilateral groin pain    • Spinal stenosis of lumbar region 08/01/2017   • Acute right-sided low back pain with right-sided sciatica 07/05/2017   • Hypertension 05/10/2017   • Vitamin D deficiency 07/19/2022       Portions of the record may have been created with voice recognition software   Occasional wrong word or "sound a like" substitutions may have occurred due to the inherent limitations of voice recognition software  Read the chart carefully and recognize, using context, where substitutions have occurred      Rochelle Betts DO, [facility]  10/24/2022 9:11 AM

## 2022-11-16 DIAGNOSIS — R00.2 PALPITATIONS: ICD-10-CM

## 2023-01-27 DIAGNOSIS — I10 ESSENTIAL HYPERTENSION: ICD-10-CM

## 2023-01-27 RX ORDER — OLMESARTAN MEDOXOMIL 40 MG/1
TABLET ORAL
Qty: 90 TABLET | Refills: 1 | Status: SHIPPED | OUTPATIENT
Start: 2023-01-27

## 2023-01-31 ENCOUNTER — OFFICE VISIT (OUTPATIENT)
Dept: FAMILY MEDICINE CLINIC | Facility: CLINIC | Age: 68
End: 2023-01-31

## 2023-01-31 VITALS
DIASTOLIC BLOOD PRESSURE: 76 MMHG | SYSTOLIC BLOOD PRESSURE: 124 MMHG | TEMPERATURE: 97.3 F | WEIGHT: 151 LBS | BODY MASS INDEX: 24.27 KG/M2 | HEART RATE: 76 BPM | HEIGHT: 66 IN

## 2023-01-31 DIAGNOSIS — Z23 NEED FOR VACCINATION AGAINST STREPTOCOCCUS PNEUMONIAE: ICD-10-CM

## 2023-01-31 DIAGNOSIS — M54.50 CHRONIC LOW BACK PAIN WITHOUT SCIATICA, UNSPECIFIED BACK PAIN LATERALITY: ICD-10-CM

## 2023-01-31 DIAGNOSIS — R00.2 PALPITATIONS: ICD-10-CM

## 2023-01-31 DIAGNOSIS — F51.01 PRIMARY INSOMNIA: ICD-10-CM

## 2023-01-31 DIAGNOSIS — E55.9 VITAMIN D DEFICIENCY: ICD-10-CM

## 2023-01-31 DIAGNOSIS — I10 PRIMARY HYPERTENSION: Primary | ICD-10-CM

## 2023-01-31 DIAGNOSIS — G89.29 CHRONIC LOW BACK PAIN WITHOUT SCIATICA, UNSPECIFIED BACK PAIN LATERALITY: ICD-10-CM

## 2023-01-31 PROBLEM — M46.1 SACROILIITIS (HCC): Status: RESOLVED | Noted: 2019-09-16 | Resolved: 2023-01-31

## 2023-01-31 RX ORDER — IBUPROFEN 800 MG/1
800 TABLET ORAL EVERY 8 HOURS PRN
Qty: 60 TABLET | Refills: 3 | Status: SHIPPED | OUTPATIENT
Start: 2023-01-31

## 2023-01-31 RX ORDER — ZOLPIDEM TARTRATE 10 MG/1
10 TABLET ORAL
Qty: 30 TABLET | Refills: 2 | Status: SHIPPED | OUTPATIENT
Start: 2023-01-31

## 2023-01-31 NOTE — PROGRESS NOTES
Name: Evangelista Montgomery      : 1955      MRN: [id number]  Encounter Provider: Aruna Herzog MD  Encounter Date: 2023   Encounter department: [facility] PRIMARY CARE    Assessment & Plan     1  Primary hypertension  Assessment & Plan:  BP stable      2  Chronic low back pain without sciatica, unspecified back pain laterality  Assessment & Plan:  C/o 7/10 pain to lower back  Taking ibuprofen that has been effective  3  Primary insomnia  Assessment & Plan:  States Ambien is helping  Not taking trazodone  4  Vitamin D deficiency  Assessment & Plan:  Last lab levels were normal  Pt had stopped taking Vit D        5  Palpitations  Assessment & Plan:  See cards, last visit 10/2022           Subjective      F/u HTN & insomnia  c/o occ anxiety but states it's stable  No chest pain, no headaches, no sob  Some cold sx but feels that she's getting better, cough at times  Review of Systems   Constitutional: Positive for fatigue  Negative for activity change and appetite change  HENT: Positive for congestion  Negative for ear pain  Respiratory: Positive for cough  Negative for shortness of breath  Cardiovascular: Negative for chest pain and leg swelling  Gastrointestinal: Negative for abdominal pain  Musculoskeletal: Positive for arthralgias and back pain  Knees, knuckle, lower back   Neurological: Negative for headaches  Psychiatric/Behavioral: The patient is nervous/anxious  Current Outpatient Medications on File Prior to Visit   Medication Sig   • aspirin (ECOTRIN LOW STRENGTH) 81 mg EC tablet Take 81 mg by mouth daily   • metoprolol tartrate (LOPRESSOR) 25 mg tablet TAKE 1 TABLET (25 MG TOTAL) BY MOUTH EVERY 12 (TWELVE) HOURS   • olmesartan (BENICAR) 40 mg tablet TAKE 1 TABLET BY MOUTH EVERY DAY   • zolpidem (AMBIEN) 10 mg tablet TAKE 1 TABLET BY MOUTH DAILY AT BEDTIME AS NEEDED FOR SLEEP     • [DISCONTINUED] ergocalciferol (VITAMIN D2) 50,000 units TAKE 1 CAPSULE BY MOUTH ONE TIME PER WEEK   • [DISCONTINUED] traZODone (DESYREL) 50 mg tablet TAKE 1 TABLET BY MOUTH DAILY AT BEDTIME       Objective     /76 (BP Location: Left arm, Patient Position: Sitting, Cuff Size: Adult)   Pulse 76   Temp (!) 97 3 °F (36 3 °C) (Temporal)   Ht 5' 6" (1 676 m) Comment: on file  Wt 68 5 kg (151 lb)   BMI 24 37 kg/m²     Physical Exam  Constitutional:       Appearance: Normal appearance  She is normal weight  HENT:      Nose: Rhinorrhea present  Neck:      Vascular: No carotid bruit  Cardiovascular:      Rate and Rhythm: Normal rate and regular rhythm  Pulses: Normal pulses  Heart sounds: Normal heart sounds  Pulmonary:      Effort: Pulmonary effort is normal       Breath sounds: Normal breath sounds  Musculoskeletal:      Cervical back: No tenderness  Right lower leg: No edema  Left lower leg: No edema  Lymphadenopathy:      Cervical: No cervical adenopathy  Neurological:      Mental Status: She is alert     Psychiatric:         Mood and Affect: Mood normal        Young Hamlin MD

## 2023-02-13 ENCOUNTER — OFFICE VISIT (OUTPATIENT)
Dept: URGENT CARE | Facility: CLINIC | Age: 68
End: 2023-02-13

## 2023-02-13 VITALS
RESPIRATION RATE: 16 BRPM | DIASTOLIC BLOOD PRESSURE: 84 MMHG | SYSTOLIC BLOOD PRESSURE: 142 MMHG | HEART RATE: 76 BPM | OXYGEN SATURATION: 99 % | TEMPERATURE: 97.6 F

## 2023-02-13 DIAGNOSIS — J02.9 ACUTE PHARYNGITIS, UNSPECIFIED ETIOLOGY: Primary | ICD-10-CM

## 2023-02-13 LAB — S PYO AG THROAT QL: NEGATIVE

## 2023-02-13 NOTE — PROGRESS NOTES
[facility]        NAME: Melita Barahona is a 74 y o  female  : 1955    MRN: [id number]  DATE: 2023  TIME: 2:07 PM    Assessment and Plan   Acute pharyngitis, unspecified etiology [J02 9]  1  Acute pharyngitis, unspecified etiology  POCT rapid strepA    Mononucleosis screen            Patient Instructions       Use tylenol or throat lozenges as needed  Recommend warm salt water gargles and throat lozenges  Use Flonase nasal spray daily  Increase your water intake and use nasal saline rinses  Recommend Mucinex as needed  Follow up with your PCP or return to the clinic if symptoms worsen or persist more than 3-5 days  Proceed to  ER if symptoms worsen  Chief Complaint     Chief Complaint   Patient presents with   • Sore Throat         History of Present Illness       Sore throat, fatigue, "sluggish", ears feel full/ blocked x 3 weeks  No fever or chills  Mild rhinitis  No cough  She tried taking motrin - mild improvement  She did not take an at home COVID test yet  Symptoms are persisting, not worse or better  Her  was sick with a URI recently but his symptoms resolved  Review of Systems   Review of Systems   Constitutional: Positive for fatigue  Negative for chills and fever  HENT: Positive for ear pain (mild fullness in ears), postnasal drip, rhinorrhea and sore throat  Negative for congestion, ear discharge, sinus pressure, sinus pain and trouble swallowing  Eyes: Negative  Respiratory: Negative  Negative for cough, shortness of breath and wheezing  Cardiovascular: Negative  Musculoskeletal: Negative for myalgias  Skin: Negative  Negative for rash  Allergic/Immunologic: Positive for environmental allergies  All other systems reviewed and are negative          Current Medications       Current Outpatient Medications:   •  aspirin (ECOTRIN LOW STRENGTH) 81 mg EC tablet, Take 81 mg by mouth daily, Disp: , Rfl:   •  ibuprofen (MOTRIN) 800 mg tablet, Take 1 tablet (800 mg total) by mouth every 8 (eight) hours as needed for mild pain or moderate pain, Disp: 60 tablet, Rfl: 3  •  metoprolol tartrate (LOPRESSOR) 25 mg tablet, TAKE 1 TABLET (25 MG TOTAL) BY MOUTH EVERY 12 (TWELVE) HOURS, Disp: 180 tablet, Rfl: 2  •  zolpidem (AMBIEN) 10 mg tablet, Take 1 tablet (10 mg total) by mouth daily at bedtime as needed for sleep, Disp: 30 tablet, Rfl: 2  •  olmesartan (BENICAR) 40 mg tablet, TAKE 1 TABLET BY MOUTH EVERY DAY (Patient not taking: Reported on 2023), Disp: 90 tablet, Rfl: 1    Current Allergies     Allergies as of 2023 - Reviewed 2023   Allergen Reaction Noted   • Diphenhydramine Hyperactivity 2017   • Gabapentin Eye Swelling and Rash 11/15/2017            The following portions of the patient's history were reviewed and updated as appropriate: allergies, current medications, past family history, past medical history, past social history, past surgical history and problem list      Past Medical History:   Diagnosis Date   • Chronic pain disorder    • Colon polyp    • Disorder of rotator cuff, right    • Hypertension    • Low back pain    • PONV (postoperative nausea and vomiting)        Past Surgical History:   Procedure Laterality Date   • BREAST BIOPSY Right    •  SECTION     • COLONOSCOPY  2021    6 MONTH RECOMMENDED= LOW-GRADE DYSPLASIA   • TONSILLECTOMY     • TUBAL LIGATION         Family History   Problem Relation Age of Onset   • Congenital heart disease Mother    • Heart attack Mother         atrial    • Congenital heart disease Father    • No Known Problems Sister    • No Known Problems Daughter    • No Known Problems Maternal Grandmother    • No Known Problems Maternal Grandfather    • No Known Problems Paternal Grandmother    • No Known Problems Paternal Grandfather    • No Known Problems Sister    • No Known Problems Sister          Medications have been verified          Objective   BP 142/84 (BP Location: Right arm, Patient Position: Sitting, Cuff Size: Adult)   Pulse 76   Temp 97 6 °F (36 4 °C) (Tympanic)   Resp 16   SpO2 99%   No LMP recorded  Patient is postmenopausal        Physical Exam     Physical Exam  Vitals and nursing note reviewed  Constitutional:       General: She is not in acute distress  Appearance: Normal appearance  She is well-developed  HENT:      Head: Normocephalic and atraumatic  Jaw: There is normal jaw occlusion  Right Ear: Hearing, ear canal and external ear normal  A middle ear effusion is present  Left Ear: Hearing, ear canal and external ear normal  A middle ear effusion is present  Nose: Mucosal edema and rhinorrhea present  No congestion  Rhinorrhea is clear  Right Turbinates: Swollen  Left Turbinates: Swollen  Right Sinus: No maxillary sinus tenderness or frontal sinus tenderness  Left Sinus: No maxillary sinus tenderness or frontal sinus tenderness  Mouth/Throat:      Lips: Pink  Mouth: Mucous membranes are moist       Dentition: Normal dentition  Pharynx: Uvula midline  Posterior oropharyngeal erythema (cobblestoning appearance to throat) present  No oropharyngeal exudate  Tonsils: No tonsillar exudate  0 on the right  0 on the left  Eyes:      Conjunctiva/sclera: Conjunctivae normal       Pupils: Pupils are equal, round, and reactive to light  Cardiovascular:      Rate and Rhythm: Normal rate and regular rhythm  Pulses: Normal pulses  Heart sounds: Normal heart sounds  Pulmonary:      Effort: Pulmonary effort is normal       Breath sounds: Normal breath sounds  Musculoskeletal:         General: Normal range of motion  Cervical back: Normal range of motion and neck supple  Lymphadenopathy:      Head:      Right side of head: Submandibular adenopathy present  Left side of head: Submandibular adenopathy present  Skin:     General: Skin is warm and dry  Capillary Refill: Capillary refill takes less than 2 seconds  Neurological:      General: No focal deficit present  Mental Status: She is alert and oriented to person, place, and time     Psychiatric:         Mood and Affect: Mood normal          Behavior: Behavior normal

## 2023-02-13 NOTE — PATIENT INSTRUCTIONS
Use tylenol or throat lozenges as needed  Recommend warm salt water gargles and throat lozenges  Use Flonase nasal spray daily  Increase your water intake and use nasal saline rinses  Recommend Mucinex as needed  Follow up with your PCP or return to the clinic if symptoms worsen or persist more than 3-5 days

## 2023-02-13 NOTE — ASSESSMENT & PLAN NOTE
Rapid strep test negative  Mononucleosis test sent to the lab  ddx includes mono vs  Allergic rhinitis  Recommend flonase, increase water intake, warm salt water gargles, and throat lozenges  Advised to avoid activities with a risk of impact  Reasons to follow up reviewed with pt  All questions answered

## 2023-02-14 LAB — HETEROPH AB SER QL: NEGATIVE

## 2023-04-10 PROBLEM — J40 BRONCHITIS: Status: ACTIVE | Noted: 2023-04-10

## 2023-04-14 PROBLEM — J02.9 ACUTE PHARYNGITIS: Status: RESOLVED | Noted: 2021-10-05 | Resolved: 2023-04-14

## 2023-05-30 DIAGNOSIS — I10 ESSENTIAL HYPERTENSION: ICD-10-CM

## 2023-05-30 RX ORDER — OLMESARTAN MEDOXOMIL 40 MG/1
TABLET ORAL
Qty: 90 TABLET | Refills: 1 | Status: SHIPPED | OUTPATIENT
Start: 2023-05-30

## 2023-06-14 NOTE — TELEPHONE ENCOUNTER
She was also concerned what the cause of her Breast being so painful and hard as a rock could be if everything came back normal  I did make her aware you may need to await the official report to come over before you can determine the cause  16

## 2023-07-31 ENCOUNTER — OFFICE VISIT (OUTPATIENT)
Dept: FAMILY MEDICINE CLINIC | Facility: CLINIC | Age: 68
End: 2023-07-31
Payer: MEDICARE

## 2023-07-31 VITALS
WEIGHT: 147 LBS | HEIGHT: 66 IN | BODY MASS INDEX: 23.63 KG/M2 | DIASTOLIC BLOOD PRESSURE: 84 MMHG | HEART RATE: 84 BPM | SYSTOLIC BLOOD PRESSURE: 130 MMHG

## 2023-07-31 DIAGNOSIS — I10 ESSENTIAL HYPERTENSION: ICD-10-CM

## 2023-07-31 DIAGNOSIS — Z78.0 POSTMENOPAUSAL: ICD-10-CM

## 2023-07-31 DIAGNOSIS — Z12.11 SCREEN FOR COLON CANCER: ICD-10-CM

## 2023-07-31 DIAGNOSIS — Z00.00 MEDICARE ANNUAL WELLNESS VISIT, SUBSEQUENT: Primary | ICD-10-CM

## 2023-07-31 DIAGNOSIS — E55.9 VITAMIN D DEFICIENCY: ICD-10-CM

## 2023-07-31 DIAGNOSIS — K63.5 POLYP OF COLON, UNSPECIFIED PART OF COLON, UNSPECIFIED TYPE: ICD-10-CM

## 2023-07-31 DIAGNOSIS — Z12.31 ENCOUNTER FOR SCREENING MAMMOGRAM FOR MALIGNANT NEOPLASM OF BREAST: ICD-10-CM

## 2023-07-31 PROBLEM — Z91.89 AT RISK FOR OSTEOPOROSIS: Status: RESOLVED | Noted: 2020-12-28 | Resolved: 2023-07-31

## 2023-07-31 PROBLEM — J40 BRONCHITIS: Status: RESOLVED | Noted: 2023-04-10 | Resolved: 2023-07-31

## 2023-07-31 PROCEDURE — G0442 ANNUAL ALCOHOL SCREEN 15 MIN: HCPCS | Performed by: FAMILY MEDICINE

## 2023-07-31 PROCEDURE — 1123F ACP DISCUSS/DSCN MKR DOCD: CPT | Performed by: FAMILY MEDICINE

## 2023-07-31 PROCEDURE — 99213 OFFICE O/P EST LOW 20 MIN: CPT | Performed by: FAMILY MEDICINE

## 2023-07-31 PROCEDURE — G0402 INITIAL PREVENTIVE EXAM: HCPCS | Performed by: FAMILY MEDICINE

## 2023-07-31 RX ORDER — OLMESARTAN MEDOXOMIL 40 MG/1
40 TABLET ORAL DAILY
Qty: 90 TABLET | Refills: 1 | Status: SHIPPED | OUTPATIENT
Start: 2023-07-31

## 2023-07-31 NOTE — PROGRESS NOTES
Assessment and Plan:     Problem List Items Addressed This Visit        Cardiovascular and Mediastinum    Essential hypertension     BP stable         Relevant Medications    olmesartan (BENICAR) 40 mg tablet    Other Relevant Orders    CBC and differential    TSH, 3rd generation    Comprehensive metabolic panel    Lipid panel       Other    Vitamin D deficiency    Relevant Orders    Vitamin D 25 hydroxy   Other Visit Diagnoses     Medicare annual wellness visit, subsequent    -  Primary    Encounter for screening mammogram for malignant neoplasm of breast        Relevant Orders    Mammo screening bilateral w 3d & cad    Screen for colon cancer        Relevant Orders    Ambulatory Referral to Gastroenterology    Polyp of colon, unspecified part of colon, unspecified type        Relevant Orders    Ambulatory Referral to Gastroenterology    Postmenopausal        Relevant Orders    DXA bone density spine hip and pelvis           Preventive health issues were discussed with patient, and age appropriate screening tests were ordered as noted in patient's After Visit Summary. Personalized health advice and appropriate referrals for health education or preventive services given if needed, as noted in patient's After Visit Summary. History of Present Illness:     Patient presents for a Medicare Wellness Visit    Here for  F/u htn, having some anxiety since retiring, getting on nerves     Patient Care Team:  Jess Foote MD as PCP - General     Review of Systems:     Review of Systems   Constitutional: Negative for activity change, appetite change and fatigue. Respiratory: Negative for shortness of breath. Cardiovascular: Negative for chest pain. Neurological: Negative for dizziness, light-headedness and headaches. Psychiatric/Behavioral: The patient is nervous/anxious.          Problem List:     Patient Active Problem List   Diagnosis   • Acute right-sided low back pain with right-sided sciatica   • Essential hypertension   • Spinal stenosis of lumbar region   • Bilateral groin pain   • Rotator cuff arthropathy of right shoulder   • Pain in the coccyx   • Primary insomnia   • Palpitations   • Vitamin D deficiency   • Chronic low back pain without sciatica      Past Medical and Surgical History:     Past Medical History:   Diagnosis Date   • Chronic pain disorder    • Colon polyp    • Disorder of rotator cuff, right    • Hypertension    • Low back pain    • PONV (postoperative nausea and vomiting)      Past Surgical History:   Procedure Laterality Date   • BREAST BIOPSY Right    •  SECTION     • COLONOSCOPY  2021    6 MONTH RECOMMENDED= LOW-GRADE DYSPLASIA   • TONSILLECTOMY     • TUBAL LIGATION        Family History:     Family History   Problem Relation Age of Onset   • Congenital heart disease Mother    • Heart attack Mother         atrial    • Congenital heart disease Father    • No Known Problems Sister    • No Known Problems Daughter    • No Known Problems Maternal Grandmother    • No Known Problems Maternal Grandfather    • No Known Problems Paternal Grandmother    • No Known Problems Paternal Grandfather    • No Known Problems Sister    • No Known Problems Sister       Social History:     Social History     Socioeconomic History   • Marital status: /Civil Union     Spouse name: None   • Number of children: 2   • Years of education: None   • Highest education level: None   Occupational History     Comment: full time employment    • Occupation: clerical   Tobacco Use   • Smoking status: Former     Packs/day: 0.10     Years: 10.00     Total pack years: 1.00     Types: Cigarettes     Start date:    • Smokeless tobacco: Never   Vaping Use   • Vaping Use: Never used   Substance and Sexual Activity   • Alcohol use:  Yes     Alcohol/week: 10.0 standard drinks of alcohol     Types: 10 Cans of beer per week     Comment: social   • Drug use: No   • Sexual activity: Yes     Partners: Male Birth control/protection: Female Sterilization   Other Topics Concern   • None   Social History Narrative    Denied history of domestic violence    House    Lives with family    No advance directives    No living will    Restorationism    Supportive and safe    3 grandchild, 2 children     Social Determinants of Health     Financial Resource Strain: Low Risk  (7/31/2023)    Overall Financial Resource Strain (CARDIA)    • Difficulty of Paying Living Expenses: Not hard at all   Food Insecurity: Not on file   Transportation Needs: No Transportation Needs (7/31/2023)    PRAPARE - Transportation    • Lack of Transportation (Medical): No    • Lack of Transportation (Non-Medical): No   Physical Activity: Not on file   Stress: Not on file   Social Connections: Not on file   Intimate Partner Violence: Not on file   Housing Stability: Not on file      Medications and Allergies:     Current Outpatient Medications   Medication Sig Dispense Refill   • aspirin (ECOTRIN LOW STRENGTH) 81 mg EC tablet Take 81 mg by mouth daily     • ibuprofen (MOTRIN) 800 mg tablet TAKE 1 TABLET (800 MG TOTAL) BY MOUTH EVERY 8 HOURS AS NEEDED FOR MILD OR MODERATE PAIN 60 tablet 3   • olmesartan (BENICAR) 40 mg tablet Take 1 tablet (40 mg total) by mouth daily 90 tablet 1   • zolpidem (AMBIEN) 10 mg tablet TAKE 1 TABLET BY MOUTH DAILY AT BEDTIME AS NEEDED FOR SLEEP 30 tablet 2     No current facility-administered medications for this visit.      Allergies   Allergen Reactions   • Diphenhydramine Hyperactivity   • Gabapentin Eye Swelling and Rash      Immunizations:     Immunization History   Administered Date(s) Administered   • COVID-19 MODERNA VACC 0.5 ML IM 03/22/2021, 04/21/2021, 11/17/2021   • H1N1, All Formulations 01/11/2010   • HPV Quadrivalent 10/02/2018   • INFLUENZA 11/28/2007, 10/28/2011, 10/01/2018, 10/20/2022   • Influenza, injectable, quadrivalent, preservative free 0.5 mL 11/19/2019, 10/06/2020, 01/13/2022, 10/20/2022   • Pneumococcal Conjugate Vaccine 20-valent (Pcv20), Polysace 01/31/2023   • Tdap 03/09/2016      Health Maintenance:         Topic Date Due   • Colorectal Cancer Screening  07/05/2021   • Breast Cancer Screening: Mammogram  06/15/2023   • Hepatitis C Screening  Completed         Topic Date Due   • COVID-19 Vaccine (4 - Moderna series) 01/12/2022   • Influenza Vaccine (1) 09/01/2023      Medicare Screening Tests and Risk Assessments:     Ronnell Johnson is here for her Welcome to Medicare visit. Health Risk Assessment:   Patient rates overall health as good. Patient feels that their physical health rating is same. Patient is satisfied with their life. Eyesight was rated as same. Hearing was rated as same. Patient feels that their emotional and mental health rating is same. Patients states they are never, rarely angry. Patient states they are sometimes unusually tired/fatigued. Pain experienced in the last 7 days has been some. Patient's pain rating has been 6/10. Patient states that she has experienced no weight loss or gain in last 6 months. Right shoulder pain    Depression Screening:   PHQ-2 Score: 0      Fall Risk Screening: In the past year, patient has experienced: no history of falling in past year      Urinary Incontinence Screening:   Patient has not leaked urine accidently in the last six months. Home Safety:  Patient does not have trouble with stairs inside or outside of their home. Patient has working smoke alarms and has no working carbon monoxide detector. Home safety hazards include: none. Nutrition:   Current diet is Regular. Medications:   Patient is currently taking over-the-counter supplements. OTC medications include: see medication list. Patient is able to manage medications. Activities of Daily Living (ADLs)/Instrumental Activities of Daily Living (IADLs):   Walk and transfer into and out of bed and chair?: Yes  Dress and groom yourself?: Yes    Bathe or shower yourself?: Yes    Feed yourself? Yes  Do your laundry/housekeeping?: Yes  Manage your money, pay your bills and track your expenses?: Yes  Make your own meals?: Yes    Do your own shopping?: Yes    Previous Hospitalizations:   Any hospitalizations or ED visits within the last 12 months?: No      Advance Care Planning:   Living will: No    Durable POA for healthcare: No    Advanced directive: No    Five wishes given: Yes      Cognitive Screening:   Provider or family/friend/caregiver concerned regarding cognition?: No    PREVENTIVE SCREENINGS      Cardiovascular Screening:    General: Screening Current      Colorectal Cancer Screening:     General: Screening Current      Breast Cancer Screening:     General: Screening Current      Cervical Cancer Screening:    General: Screening Not Indicated      Osteoporosis Screening:    General: Risks and Benefits Discussed    Due for: DXA Axial      Lung Cancer Screening:     General: Screening Not Indicated      Hepatitis C Screening:    General: Screening Current    Screening, Brief Intervention, and Referral to Treatment (SBIRT)    Screening  Typical number of drinks in a day: 3  Typical number of drinks in a week: 20  Interpretation: Low risk drinking behavior. AUDIT-C Screenin) How often did you have a drink containing alcohol in the past year? 4 or more times a week  2) How many drinks did you have on a typical day when you were drinking in the past year? 3 to 4  3) How often did you have 6 or more drinks on one occasion in the past year? less than monthly    AUDIT-C Score: 5  Interpretation: Score 3-12 (female): POSITIVE screen for alcohol misuse    AUDIT Screenin) How often during the last year have you found that you were not able to stop drinking once you had started?  0 - never  5) How often during the last year have you failed to do what was normally expected from you because of drinking? 0 - never  6) How often during the last year have you needed a first drink in the morning to get yourself going after a heavy drinking session? 0 - never  7) How often during the last year have you had a feeling of guilt or remorse after drinking? 0 - never  8) How often during the last year have you been unable to remember what happened the night before because you had been drinking? 0 - never  9) Have you or someone else been injured as a result of your drinking? 0 - no  10) Has a relative or friend or a doctor or another health worker been concerned about your drinking or suggested you cut down? 0 - no    AUDIT Score: 5  Interpretation: Low risk alcohol consumption    Single Item Drug Screening:  How often have you used an illegal drug (including marijuana) or a prescription medication for non-medical reasons in the past year? never    Single Item Drug Screen Score: 0  Interpretation: Negative screen for possible drug use disorder    Brief Intervention  Alcohol & drug use screenings were reviewed. No concerns regarding substance use disorder identified. Healthy alcohol use/limits discussed. counselled  1-2  alcoholic  Drinks/day    Time Spent  Time spent screening/evaluating the patient for alcohol misuse: 5 minutes. Other Counseling Topics:   Car/seat belt/driving safety, skin self-exam, sunscreen and calcium and vitamin D intake and regular weightbearing exercise. Vision Screening    Right eye Left eye Both eyes   Without correction      With correction 20/20 2020         Physical Exam:     /84   Pulse 84   Ht 5' 6" (1.676 m)   Wt 66.7 kg (147 lb)   BMI 23.73 kg/m²     Physical Exam  Vitals reviewed. Constitutional:       Appearance: Normal appearance. Neck:      Vascular: No carotid bruit. Cardiovascular:      Rate and Rhythm: Normal rate and regular rhythm. Pulses: Normal pulses. Heart sounds: Normal heart sounds. Pulmonary:      Effort: Pulmonary effort is normal.      Breath sounds: Normal breath sounds. Musculoskeletal:      Right lower leg: No edema. Left lower leg: No edema. Lymphadenopathy:      Cervical: No cervical adenopathy. Neurological:      Mental Status: She is alert.    Psychiatric:         Mood and Affect: Mood normal.          Tracie Yoon MD

## 2023-08-01 ENCOUNTER — TELEPHONE (OUTPATIENT)
Dept: ADMINISTRATIVE | Facility: OTHER | Age: 68
End: 2023-08-01

## 2023-08-01 DIAGNOSIS — F51.01 PRIMARY INSOMNIA: ICD-10-CM

## 2023-08-01 DIAGNOSIS — G89.29 CHRONIC LOW BACK PAIN WITHOUT SCIATICA, UNSPECIFIED BACK PAIN LATERALITY: ICD-10-CM

## 2023-08-01 DIAGNOSIS — M54.50 CHRONIC LOW BACK PAIN WITHOUT SCIATICA, UNSPECIFIED BACK PAIN LATERALITY: ICD-10-CM

## 2023-08-01 RX ORDER — IBUPROFEN 800 MG/1
TABLET ORAL
Qty: 60 TABLET | Refills: 3 | Status: SHIPPED | OUTPATIENT
Start: 2023-08-01

## 2023-08-01 RX ORDER — ZOLPIDEM TARTRATE 10 MG/1
TABLET ORAL
Qty: 30 TABLET | Refills: 2 | Status: SHIPPED | OUTPATIENT
Start: 2023-08-01

## 2023-08-01 NOTE — TELEPHONE ENCOUNTER
----- Message from 22 Claudia De Anda sent at 7/31/2023  2:10 PM EDT -----  07/31/23 2:11 PM    Hello, our patient Lennie Sweet has had CRC: Colonoscopy completed/performed. Please assist in updating the patient chart by pulling the document from the Media Tab. The date of service is 2021.      Thank you,  Sobeida De Anda  [facility]

## 2023-08-02 NOTE — TELEPHONE ENCOUNTER
Upon review of the In Basket request we have found this is a duplicate request and no further action is needed. This request was completed upon initial request, the patient chart is up to date, and this message will now be closed. Any additional questions or concerns should be emailed to the Practice Liaisons via the appropriate education email address, please do not reply via In Basket.     Thank you  Brenda Wray

## 2023-09-20 ENCOUNTER — TELEPHONE (OUTPATIENT)
Dept: FAMILY MEDICINE CLINIC | Facility: CLINIC | Age: 68
End: 2023-09-20

## 2023-09-20 NOTE — TELEPHONE ENCOUNTER
Left voicemail for patient to call me about ovs 7/31/2023. She had an AWV and regular visit. Insurance paid AWV at 100% but visit is under deductible.

## 2023-10-25 DIAGNOSIS — M54.50 CHRONIC LOW BACK PAIN WITHOUT SCIATICA, UNSPECIFIED BACK PAIN LATERALITY: ICD-10-CM

## 2023-10-25 DIAGNOSIS — G89.29 CHRONIC LOW BACK PAIN WITHOUT SCIATICA, UNSPECIFIED BACK PAIN LATERALITY: ICD-10-CM

## 2023-10-25 RX ORDER — IBUPROFEN 800 MG/1
TABLET ORAL
Qty: 60 TABLET | Refills: 3 | Status: SHIPPED | OUTPATIENT
Start: 2023-10-25

## 2023-12-07 ENCOUNTER — HOSPITAL ENCOUNTER (OUTPATIENT)
Dept: MAMMOGRAPHY | Facility: CLINIC | Age: 68
End: 2023-12-07
Payer: MEDICARE

## 2023-12-07 VITALS — WEIGHT: 147 LBS | HEIGHT: 66 IN | BODY MASS INDEX: 23.63 KG/M2

## 2023-12-07 DIAGNOSIS — Z12.31 ENCOUNTER FOR SCREENING MAMMOGRAM FOR MALIGNANT NEOPLASM OF BREAST: ICD-10-CM

## 2023-12-07 PROCEDURE — 77067 SCR MAMMO BI INCL CAD: CPT

## 2023-12-07 PROCEDURE — 77063 BREAST TOMOSYNTHESIS BI: CPT

## 2024-01-29 DIAGNOSIS — I10 ESSENTIAL HYPERTENSION: ICD-10-CM

## 2024-01-29 DIAGNOSIS — F51.01 PRIMARY INSOMNIA: ICD-10-CM

## 2024-01-29 RX ORDER — OLMESARTAN MEDOXOMIL 40 MG/1
40 TABLET ORAL DAILY
Qty: 90 TABLET | Refills: 1 | Status: SHIPPED | OUTPATIENT
Start: 2024-01-29

## 2024-01-29 RX ORDER — ZOLPIDEM TARTRATE 10 MG/1
TABLET ORAL
Qty: 30 TABLET | Refills: 2 | Status: SHIPPED | OUTPATIENT
Start: 2024-01-29

## 2024-02-12 ENCOUNTER — OFFICE VISIT (OUTPATIENT)
Dept: FAMILY MEDICINE CLINIC | Facility: CLINIC | Age: 69
End: 2024-02-12
Payer: MEDICARE

## 2024-02-12 VITALS
OXYGEN SATURATION: 96 % | SYSTOLIC BLOOD PRESSURE: 138 MMHG | BODY MASS INDEX: 23.63 KG/M2 | DIASTOLIC BLOOD PRESSURE: 80 MMHG | HEART RATE: 88 BPM | HEIGHT: 66 IN | WEIGHT: 147 LBS

## 2024-02-12 DIAGNOSIS — I10 ESSENTIAL HYPERTENSION: Primary | ICD-10-CM

## 2024-02-12 DIAGNOSIS — E55.9 VITAMIN D DEFICIENCY: ICD-10-CM

## 2024-02-12 DIAGNOSIS — K63.5 POLYP OF COLON, UNSPECIFIED PART OF COLON, UNSPECIFIED TYPE: ICD-10-CM

## 2024-02-12 DIAGNOSIS — M25.50 ARTHRALGIA, UNSPECIFIED JOINT: ICD-10-CM

## 2024-02-12 DIAGNOSIS — R00.2 PALPITATIONS: ICD-10-CM

## 2024-02-12 PROCEDURE — 99214 OFFICE O/P EST MOD 30 MIN: CPT | Performed by: FAMILY MEDICINE

## 2024-02-12 NOTE — PROGRESS NOTES
Name: Sj Cruz      : 1955      MRN: [id number]  Encounter Provider: Loretta Singh MD  Encounter Date: 2024   Encounter department: [facility]    Assessment & Plan     1. Essential hypertension  Assessment & Plan:  BP  stable  on Benicar  40      2. Palpitations  Assessment & Plan:  Had up a Holter monitor done previously by cardiology for 2 days that was negative but she continues with the palpitations    Orders:  -     Holter monitor; Future; Expected date: 2024    3. Vitamin D deficiency  Assessment & Plan:  Await lab      4. Arthralgia, unspecified joint  Assessment & Plan:  Recommend fish oil  3600 mg a day and glucosamine chondroitin 1500 mg a day and see how she does with that      5. Polyp of colon, unspecified part of colon, unspecified type  -     Ambulatory Referral to General Surgery; Future           Subjective      Patient presents with:  Follow-up: Patient present today for her 6 month follow up.  Follow-up for hypertension doing well on Benicar,on ambien for  insomnia, having  palpitations  at  night, does need  f/u scope  for  colon polyp      Review of Systems   Constitutional:  Negative for activity change, appetite change and fatigue.   Respiratory:  Negative for shortness of breath.    Cardiovascular:  Negative for chest pain.   Neurological:  Negative for dizziness, light-headedness and headaches.       Current Outpatient Medications on File Prior to Visit   Medication Sig    aspirin (ECOTRIN LOW STRENGTH) 81 mg EC tablet Take 81 mg by mouth daily    ibuprofen (MOTRIN) 800 mg tablet TAKE 1 TABLET (800 MG TOTAL) BY MOUTH EVERY 8 HOURS AS NEEDED FOR MILD OR MODERATE PAIN    olmesartan (BENICAR) 40 mg tablet TAKE 1 TABLET BY MOUTH EVERY DAY    zolpidem (AMBIEN) 10 mg tablet TAKE 1 TABLET BY MOUTH DAILY AT BEDTIME AS NEEDED FOR SLEEP.       Objective     /80 (BP Location: Left arm, Patient Position: Sitting, Cuff Size: Standard)   Pulse 88   Ht 5'  "6\" (1.676 m)   Wt 66.7 kg (147 lb)   SpO2 96%   BMI 23.73 kg/m²     Physical Exam  Vitals reviewed.   Constitutional:       Appearance: Normal appearance.   Cardiovascular:      Rate and Rhythm: Normal rate and regular rhythm.      Pulses: Normal pulses.      Heart sounds: Normal heart sounds.   Pulmonary:      Effort: Pulmonary effort is normal.      Breath sounds: Normal breath sounds.   Musculoskeletal:      Right lower leg: No edema.      Left lower leg: No edema.   Lymphadenopathy:      Cervical: No cervical adenopathy.   Neurological:      Mental Status: She is alert.   Psychiatric:         Mood and Affect: Mood normal.       Loretta Singh MD    "

## 2024-02-12 NOTE — ASSESSMENT & PLAN NOTE
Had up a Holter monitor done previously by cardiology for 2 days that was negative but she continues with the palpitations

## 2024-02-12 NOTE — ASSESSMENT & PLAN NOTE
Recommend fish oil  3600 mg a day and glucosamine chondroitin 1500 mg a day and see how she does with that

## 2024-02-19 DIAGNOSIS — G89.29 CHRONIC LOW BACK PAIN WITHOUT SCIATICA, UNSPECIFIED BACK PAIN LATERALITY: ICD-10-CM

## 2024-02-19 DIAGNOSIS — M54.50 CHRONIC LOW BACK PAIN WITHOUT SCIATICA, UNSPECIFIED BACK PAIN LATERALITY: ICD-10-CM

## 2024-02-19 RX ORDER — IBUPROFEN 800 MG/1
TABLET ORAL
Qty: 60 TABLET | Refills: 2 | Status: SHIPPED | OUTPATIENT
Start: 2024-02-19

## 2024-02-26 ENCOUNTER — APPOINTMENT (OUTPATIENT)
Dept: LAB | Facility: CLINIC | Age: 69
End: 2024-02-26
Payer: MEDICARE

## 2024-02-26 DIAGNOSIS — I10 ESSENTIAL HYPERTENSION: ICD-10-CM

## 2024-02-26 DIAGNOSIS — E55.9 VITAMIN D DEFICIENCY: ICD-10-CM

## 2024-02-26 LAB
25(OH)D3 SERPL-MCNC: 33.1 NG/ML (ref 30–100)
ALBUMIN SERPL BCP-MCNC: 4.3 G/DL (ref 3.5–5)
ALP SERPL-CCNC: 51 U/L (ref 34–104)
ALT SERPL W P-5'-P-CCNC: 11 U/L (ref 7–52)
ANION GAP SERPL CALCULATED.3IONS-SCNC: 10 MMOL/L
AST SERPL W P-5'-P-CCNC: 13 U/L (ref 13–39)
BASOPHILS # BLD AUTO: 0.03 THOUSANDS/ÂΜL (ref 0–0.1)
BASOPHILS NFR BLD AUTO: 1 % (ref 0–1)
BILIRUB SERPL-MCNC: 0.7 MG/DL (ref 0.2–1)
BUN SERPL-MCNC: 15 MG/DL (ref 5–25)
CALCIUM SERPL-MCNC: 9.3 MG/DL (ref 8.4–10.2)
CHLORIDE SERPL-SCNC: 103 MMOL/L (ref 96–108)
CHOLEST SERPL-MCNC: 229 MG/DL
CO2 SERPL-SCNC: 26 MMOL/L (ref 21–32)
CREAT SERPL-MCNC: 0.6 MG/DL (ref 0.6–1.3)
EOSINOPHIL # BLD AUTO: 0.09 THOUSAND/ÂΜL (ref 0–0.61)
EOSINOPHIL NFR BLD AUTO: 2 % (ref 0–6)
ERYTHROCYTE [DISTWIDTH] IN BLOOD BY AUTOMATED COUNT: 12.2 % (ref 11.6–15.1)
GFR SERPL CREATININE-BSD FRML MDRD: 95 ML/MIN/1.73SQ M
GLUCOSE P FAST SERPL-MCNC: 105 MG/DL (ref 65–99)
HCT VFR BLD AUTO: 41 % (ref 34.8–46.1)
HDLC SERPL-MCNC: 96 MG/DL
HGB BLD-MCNC: 13.8 G/DL (ref 11.5–15.4)
IMM GRANULOCYTES # BLD AUTO: 0.01 THOUSAND/UL (ref 0–0.2)
IMM GRANULOCYTES NFR BLD AUTO: 0 % (ref 0–2)
LDLC SERPL CALC-MCNC: 121 MG/DL (ref 0–100)
LYMPHOCYTES # BLD AUTO: 1.31 THOUSANDS/ÂΜL (ref 0.6–4.47)
LYMPHOCYTES NFR BLD AUTO: 26 % (ref 14–44)
MCH RBC QN AUTO: 32 PG (ref 26.8–34.3)
MCHC RBC AUTO-ENTMCNC: 33.7 G/DL (ref 31.4–37.4)
MCV RBC AUTO: 95 FL (ref 82–98)
MONOCYTES # BLD AUTO: 0.33 THOUSAND/ÂΜL (ref 0.17–1.22)
MONOCYTES NFR BLD AUTO: 7 % (ref 4–12)
NEUTROPHILS # BLD AUTO: 3.29 THOUSANDS/ÂΜL (ref 1.85–7.62)
NEUTS SEG NFR BLD AUTO: 64 % (ref 43–75)
NONHDLC SERPL-MCNC: 133 MG/DL
NRBC BLD AUTO-RTO: 0 /100 WBCS
PLATELET # BLD AUTO: 249 THOUSANDS/UL (ref 149–390)
PMV BLD AUTO: 9.7 FL (ref 8.9–12.7)
POTASSIUM SERPL-SCNC: 4.1 MMOL/L (ref 3.5–5.3)
PROT SERPL-MCNC: 6.6 G/DL (ref 6.4–8.4)
RBC # BLD AUTO: 4.31 MILLION/UL (ref 3.81–5.12)
SODIUM SERPL-SCNC: 139 MMOL/L (ref 135–147)
TRIGL SERPL-MCNC: 58 MG/DL
TSH SERPL DL<=0.05 MIU/L-ACNC: 1.06 UIU/ML (ref 0.45–4.5)
WBC # BLD AUTO: 5.06 THOUSAND/UL (ref 4.31–10.16)

## 2024-02-26 PROCEDURE — 80053 COMPREHEN METABOLIC PANEL: CPT

## 2024-02-26 PROCEDURE — 85025 COMPLETE CBC W/AUTO DIFF WBC: CPT

## 2024-02-26 PROCEDURE — 84443 ASSAY THYROID STIM HORMONE: CPT

## 2024-02-26 PROCEDURE — 82306 VITAMIN D 25 HYDROXY: CPT

## 2024-02-26 PROCEDURE — 36415 COLL VENOUS BLD VENIPUNCTURE: CPT

## 2024-02-26 PROCEDURE — 80061 LIPID PANEL: CPT

## 2024-02-28 DIAGNOSIS — E78.2 MIXED HYPERLIPIDEMIA: Primary | ICD-10-CM

## 2024-02-28 RX ORDER — ATORVASTATIN CALCIUM 10 MG/1
10 TABLET, FILM COATED ORAL 3 TIMES WEEKLY
Qty: 36 TABLET | Refills: 3 | Status: SHIPPED | OUTPATIENT
Start: 2024-02-28

## 2024-03-14 ENCOUNTER — HOSPITAL ENCOUNTER (OUTPATIENT)
Dept: NON INVASIVE DIAGNOSTICS | Facility: HOSPITAL | Age: 69
Discharge: HOME | End: 2024-03-14
Payer: MEDICARE

## 2024-03-14 DIAGNOSIS — R00.2 PALPITATIONS: ICD-10-CM

## 2024-03-14 PROCEDURE — 93225 XTRNL ECG REC<48 HRS REC: CPT

## 2024-03-14 PROCEDURE — 93226 XTRNL ECG REC<48 HR SCAN A/R: CPT

## 2024-03-21 PROCEDURE — 93227 XTRNL ECG REC<48 HR R&I: CPT | Performed by: STUDENT IN AN ORGANIZED HEALTH CARE EDUCATION/TRAINING PROGRAM

## 2024-03-24 DIAGNOSIS — R00.2 PALPITATIONS: Primary | ICD-10-CM

## 2024-03-24 DIAGNOSIS — R00.0 TACHYCARDIA: ICD-10-CM

## 2024-03-25 ENCOUNTER — TELEPHONE (OUTPATIENT)
Dept: FAMILY MEDICINE CLINIC | Facility: CLINIC | Age: 69
End: 2024-03-25

## 2024-03-25 NOTE — TELEPHONE ENCOUNTER
Michelle Knight MA  3/25/2024 12:10 PM EDT Back to Top      LMOM to c/b    Loretta Singh MD  3/24/2024  9:36 PM EDT       Pt  had  almost 2  hours with HR  above  100-rec cards  eval, referral entered          Patient Communication     Append Comments   Seen Back to Top    Pt  had  almost 2  hours with HR  above  100-rec cards  eval, referral entered   Written by Loretta Singh MD on 3/24/2024  9:36 PM EDT  Seen by patient Sj Cruz on 3/25/2024  1:40 AM

## 2024-04-23 DIAGNOSIS — M54.50 CHRONIC LOW BACK PAIN WITHOUT SCIATICA, UNSPECIFIED BACK PAIN LATERALITY: ICD-10-CM

## 2024-04-23 DIAGNOSIS — G89.29 CHRONIC LOW BACK PAIN WITHOUT SCIATICA, UNSPECIFIED BACK PAIN LATERALITY: ICD-10-CM

## 2024-04-23 RX ORDER — IBUPROFEN 800 MG/1
TABLET ORAL
Qty: 60 TABLET | Refills: 2 | Status: SHIPPED | OUTPATIENT
Start: 2024-04-23

## 2024-04-25 DIAGNOSIS — F51.01 PRIMARY INSOMNIA: ICD-10-CM

## 2024-04-25 RX ORDER — ZOLPIDEM TARTRATE 10 MG/1
TABLET ORAL
Qty: 30 TABLET | Refills: 2 | Status: SHIPPED | OUTPATIENT
Start: 2024-04-25

## 2024-05-01 NOTE — PROGRESS NOTES
Outpatient Consultation - General Cardiology   Sj Cruz 69 y.o. female   MRN: [id number]  Encounter: [id number]      PCP: Loretta Singh MD      History of Present Illness   Physician Requesting Consult: Consults   Reason for Consult / Principal Problem: Palpitations    HPI: Sj Cruz is a 69 y.o. year old retired female who was referred to [facility] outpatient [facility] by her PCP for palpitations. She has a history of HTN, HLD, lumbar spinal stenosis, insomnia. She comes in for lightheadedness and palpitations. She feels lightheadedness comes on randomly with palpitations at the same time. It happens most days. Happens at rest mostly, does not notice with activity. Not positional, does not notice when standing up. Started in February getting worse. The episodes last for a few minutes at a time.. She completed a holter monitor but did not have symptoms during the monitor. She feels she gets stressed sometimes at home but not feels anxious with these episodes. Does not check her BP at home. No new medications. She drinks 4 water glasses a day. She walks every day and always drinks water.    Family History: mother with CHF at 57- lots of her relatives had CHF young as well, father in late 70s had CHF  Tobacco use: never smoker  Alcohol use: 6 beers a week  Caffeine intake: 1/2 cup a day  Drug use: none  Exercise: walks every day for 4 minutes  Diet: makes her own meals, avoids sodium, eats varied diet  Social: lives at [facility] with     Reviewed Prior Cardiac studies:  Echo 3/16/21: LVEF 60%, mild to moderate MAC, otherwise structurally normal    Exercise ECG stress test 4/1/21: Exercised 6 minutes achieving 7 METS with heart rate of 157 (100% maximum predicted heart rate).  Test was stopped for dyspnea but no chest pain.  There were upsloping ST depressions in the inferior and lateral leads but study was nondiagnostic for ischemia.    Holter monitor 4/1/2021: Predominant underlying rhythm was Sinus  Rhythm. 8 Supraventricular Tachycardia runs occurred, the run with the fastest interval lasting 6 beats with a max rate of 193 bpm, the longest lasting 10 beats with an avg rate of 107 bpm. Supraventricular Tachycardia was detected within +/- 45 seconds of symptomatic patient event(s) On 2 occasions. For the most part the triggered events noted were associated with sinus rhythm with a normal heart rate.    Holter monitor 3/14/24: Average heart rate 73 bpm with rare PVCs and PACs.  1 short episode of 4 beats of SVT.  No sustained arrhythmias.    Review of Systems  Review of system was conducted and was negative except for as stated in the HPI.      Historical Information   Past Medical History:   Diagnosis Date    Chronic pain disorder     Colon polyp     Disorder of rotator cuff, right     Hypertension     Low back pain     PONV (postoperative nausea and vomiting)      Past Surgical History:   Procedure Laterality Date    BREAST BIOPSY Right      SECTION      COLONOSCOPY  2021    6 MONTH RECOMMENDED= LOW-GRADE DYSPLASIA    TONSILLECTOMY      TUBAL LIGATION       Social History     Substance and Sexual Activity   Alcohol Use Yes    Alcohol/week: 10.0 standard drinks of alcohol    Types: 10 Cans of beer per week    Comment: social     Social History     Substance and Sexual Activity   Drug Use No     Social History     Tobacco Use   Smoking Status Former    Current packs/day: 0.10    Average packs/day: 0.1 packs/day for 16.3 years (1.6 ttl pk-yrs)    Types: Cigarettes    Start date:    Smokeless Tobacco Never     Family History: non-contributory    Meds/Allergies   Home Medications:   Current Outpatient Medications:     aspirin (ECOTRIN LOW STRENGTH) 81 mg EC tablet, Take 81 mg by mouth daily, Disp: , Rfl:     atorvastatin (LIPITOR) 10 mg tablet, Take 1 tablet (10 mg total) by mouth 3 (three) times a week, Disp: 36 tablet, Rfl: 3    ibuprofen (MOTRIN) 800 mg tablet, TAKE 1 TABLET (800 MG TOTAL) BY  "MOUTH EVERY 8 HOURS AS NEEDED FOR MILD OR MODERATE PAIN, Disp: 60 tablet, Rfl: 2    olmesartan (BENICAR) 40 mg tablet, TAKE 1 TABLET BY MOUTH EVERY DAY, Disp: 90 tablet, Rfl: 1    zolpidem (AMBIEN) 10 mg tablet, TAKE 1 TABLET BY MOUTH DAILY AT BEDTIME AS NEEDED FOR SLEEP., Disp: 30 tablet, Rfl: 2    Allergies   Allergen Reactions    Diphenhydramine Hyperactivity    Gabapentin Eye Swelling and Rash         Objective   Vitals: There were no vitals taken for this visit.      Physical Exam  GEN: Sj Cruz appears well, alert and oriented x 3, pleasant and cooperative   HEENT:  Normocephalic, atraumatic, anicteric, moist mucous membranes  NECK: No JVD or carotid bruits   HEART: regular rhythm, regualr rate, normal S1 and S2, no murmurs, clicks, gallops or rubs   LUNGS: Clear to auscultation bilaterally; no wheezes, rales, or rhonchi; respiration nonlabored   ABDOMEN:  Normoactive bowel sounds, soft, no tenderness, no distention  EXTREMITIES: peripheral pulses palpable; no edema  NEURO: no gross focal findings; cranial nerves grossly intact   SKIN:  Dry, intact, warm to touch    Lab Results: I have personally reviewed pertinent lab results.    No results found for: \"HSTNI0\", \"HSTNI2\", \"HSTNI4\", \"HSTNI\"  No results found for: \"NTBNP\"  Lab Results   Component Value Date    TRIG 58 02/26/2024    HDL 96 02/26/2024    LDLCALC 121 (H) 02/26/2024     Lab Results   Component Value Date    K 4.1 02/26/2024    CO2 26 02/26/2024     02/26/2024    BUN 15 02/26/2024    CREATININE 0.60 02/26/2024    ALT 11 02/26/2024    AST 13 02/26/2024     Lab Results   Component Value Date    WBC 5.06 02/26/2024    HGB 13.8 02/26/2024    HCT 41.0 02/26/2024    MCV 95 02/26/2024     02/26/2024     No results found for: \"INR\"  Lab Results   Component Value Date    HGBA1C 5.5 04/24/2020        Imaging: I have personally reviewed pertinent reports.        EKG:   Date: 5/2/24  Interpretation: normal sinus rhythm, normal ECG    Previous " STRESS TEST:  Results for orders placed during the hospital encounter of 21    Stress test only, exercise    Narrative  [facility]  [address].  [place], [place] [address] [phone]    Stress Electrocardiography during Exercise    Name: STEVE MOLINA  MR #: [id number]  Account #: [id number]  Study date: 2021  : 1955  Age: 65 years  Gender: Female  Height: 64 in  Weight: 147 lb  BSA: 1.72 mï¾²    Allergies: DIPHENHYDRAMINE, GABAPENTIN    Diagnosis: I10. - Essential (primary) hypertension, R00.2 - Palpitations, R07.9 - Chest pain, unspecified    Primary Physician:  MT SHERIDAN  Referring Physician:  Lino Petersno D.O.  RN:  Chantel Kingston RN  GROUP:  [facility]  Interpreting Physician:  Lino Peterson D.O.  Report Prepared By::  Chantel Kingston RN    CLINICAL QUESTION: Detection of coronary artery disease.    HISTORY: Pt currently wearing zio monitor patch. The patient is a 65 year old  female. Chest pain status: chest pain. Other symptoms: dyspnea and palpitations. Coronary artery disease risk factors: hypertension, former smoking,  and family history of premature coronary artery disease. Cardiovascular history: none significant. Medications: include an ACE inhibitor/ARB and aspirin.    PHYSICAL EXAM: Baseline physical exam screening: no wheezes audible, + heart murmur    REST ECG: Normal sinus rhythm.    PROCEDURE: Treadmill exercise testing was performed, using the Josué protocol. Stress electrocardiographic evaluation was performed.    JOSUÉ PROTOCOL:  HR bpm SBP mmHg DBP mmHg Symptoms  Baseline 72 140 96 none  Stage 1 127 150 90 none  Stage 2 157 160 96 dyspnea, fatigue  Immediate 157 -- -- same as above  Recovery 1 134 160 100 same as above  Recovery 2 104 -- -- subsiding  Recovery 3 97 140 96 subsiding  Recovery 5 95 136 90 none  No medications or fluids given.    STRESS SUMMARY: Baseline O2 sat 100%, Peak O2 sat 94%  Duration of exercise was 6 min and 0 sec. The patient exercised to protocol stage 2. Maximal work rate was 7 METs. Maximal heart rate during stress was 157 bpm ( 100 % of maximal  predicted heart rate). The heart rate response to stress was normal. There was resting hypertension with a blunted blood pressure response to stress. The rate-pressure product for the peak heart rate and blood pressure was 13032. There was  no chest pain during stress. The stress test was terminated due to dyspnea and fatigue. The stress ECG was equivocal for ischemia. The patient developed 1 mm horizontal to upsloping ST segment depressions at peak exercise in II, III, aVF,  V4-V6, which quickly resolved inside of 1 minute into recovery Arrhythmia during stress: isolated premature ventricular beats.    SUMMARY:  -  Stress results: Duration of exercise was 6 min and 0 sec. Target heart rate was achieved. There was resting hypertension with a blunted blood pressure response to stress. There was no chest pain during stress.  -  ECG conclusions: The stress ECG was equivocal for ischemia. The patient developed 1 mm horizontal to upsloping ST segment depressions at peak exercise in II, III, aVF, V4-V6, which quickly resolved inside of 1 minute into recovery    IMPRESSIONS: Equivocal study after maximal exercise. There were borderline EKG changes for ischemia at maximum exercise with 1mm horizontal to upsloping ST segment depressions in the inferior and lateral leads, which resolved completely  within 1 minute into recovery.    Prepared and signed by    Lino Peterson D.O.  Signed 03/16/2021 16:42:59       ECHO:  Results for orders placed during the hospital encounter of 03/16/21    Echo complete with contrast if indicated    Narrative  [address] [address] [phone]    Transthoracic Echocardiogram  2D, M-mode, Doppler, and Color Doppler    Study date:  16-Mar-2021    Patient:  STEVE MOLINA  MR number: [id number]  Account number: [id number]  : 1955  Age: 65 years  Gender: Female  Status: Outpatient  Location: Echo lab  Height: 64 in  Weight: 146.7 lb  BP: 128/ 88 mmHg    Indications: shortness of breath, murmur.    Diagnoses: R01.1 - Cardiac murmur, unspecified, R06.02 - Shortness of breath    Sonographer:  Mahendra Davis RDCS  Primary Physician:  Loretta Singh MD  Referring Physician:  Lino Peterson D.O.  Group:  [facility]  Interpreting Physician:  Lino Peterson D.O.    SUMMARY    LEFT VENTRICLE:  Systolic function was normal by visual assessment. Ejection fraction was estimated to be 60 %.  There were no regional wall motion abnormalities.  Doppler parameters were consistent with abnormal left ventricular relaxation (grade 1 diastolic dysfunction).    MITRAL VALVE:  There was mild to moderate annular calcification.  There was trace regurgitation.    TRICUSPID VALVE:  There was mild regurgitation.  Estimated peak PA pressure was 29 mmHg.    IVC, HEPATIC VEINS:  The inferior vena cava was normal in size and course.  Respirophasic changes were normal.    SUMMARY MEASUREMENTS  2D measurements:  Unspecified Anatomy:   LAESV Index (A-L) was 24.5 ml/m2.  LAESVInd MOD BP was 23 ml/m2.  RAAs A4C was 10.2 cm2.  RWT was 0.4 .  CW measurements:  Unspecified Anatomy:   TR Vmax was 2.5 m/s.  TR maxPG was 25.8 mmHg.  MM measurements:  Unspecified Anatomy:   TAPSE was 2.8 cm.  PW measurements:  Unspecified Anatomy:   E' Avg was 0.1 m/s.  E' Lat was 0 m/s.  E' Sept was 0.1 m/s.  E/E' Avg was 10.3 .  E/E' Lat was 14.1 .  E/E' Sept was 8.1 .  MV A Adeel was 0.7 m/s.  MV Dec [place] was 2.4 m/s2.  MV DecT was 222.2 ms.  MV E Adeel was 0.5  m/s.  MV E/A Ratio was 0.7 .  TV E' lat was 0.1 m/s.    HISTORY: PRIOR HISTORY: hypertension.    PROCEDURE: The procedure was performed in the echo lab. This was a routine study. The transthoracic approach was used. The study included  complete 2D imaging, M-mode, complete spectral Doppler, and color Doppler. The heart rate was 84 bpm,  at the start of the study. Images were obtained from the parasternal, apical, subcostal, and suprasternal notch acoustic windows. Echocardiographic views were limited due to lung interference. Image quality was adequate.    LEFT VENTRICLE: Size was normal. Systolic function was normal by visual assessment. Ejection fraction was estimated to be 60 %. There were no regional wall motion abnormalities. Wall thickness was normal. DOPPLER: Doppler parameters were  consistent with abnormal left ventricular relaxation (grade 1 diastolic dysfunction).    RIGHT VENTRICLE: The size was normal. Systolic function was normal. Wall thickness was normal.    LEFT ATRIUM: Size was normal.    RIGHT ATRIUM: Size was normal.    MITRAL VALVE: There was mild to moderate annular calcification. Valve structure was normal. There was mild thickening of the anterior leaflet. There was normal leaflet separation. DOPPLER: The transmitral velocity was within the normal  range. There was no evidence for stenosis. There was trace regurgitation.    AORTIC VALVE: The valve was trileaflet. Leaflets exhibited normal thickness and normal cuspal separation. DOPPLER: Transaortic velocity was within the normal range. There was no evidence for stenosis. There was no regurgitation.    TRICUSPID VALVE: The valve structure was normal. There was normal leaflet separation. DOPPLER: The transtricuspid velocity was within the normal range. There was no evidence for stenosis. There was mild regurgitation. Estimated peak PA  pressure was 29 mmHg.    PULMONIC VALVE: Not well visualized. DOPPLER: There was no significant regurgitation.    PERICARDIUM: There was no pericardial effusion. The pericardium was normal in appearance.    AORTA: The root exhibited normal size.    SYSTEMIC VEINS: IVC: The inferior vena cava was normal in size and course. Respirophasic changes  were normal.    SYSTEM MEASUREMENT TABLES    2D  %FS: 32.8 %  Ao Diam: 3.9 cm  EDV(Teich): 73.1 ml  EF(Teich): 61.8 %  ESV(Teich): 28 ml  IVSd: 1 cm  LA Diam: 3.5 cm  LAAs A2C: 14.7 cm2  LAAs A4C: 14.6 cm2  LAESV A-L A2C: 38.4 ml  LAESV A-L A4C: 41.8 ml  LAESV Index (A-L): 24.5 ml/m2  LAESV MOD A2C: 35.3 ml  LAESV MOD A4C: 41 ml  LAESV(A-L): 42.2 ml  LAESV(MOD BP): 39.5 ml  LAESVInd MOD BP: 23 ml/m2  LALs A2C: 4.8 cm  LALs A4C: 4.3 cm  LVIDd: 4.1 cm  LVIDs: 2.7 cm  LVPWd: 0.8 cm  RAAs A4C: 10.2 cm2  RAESV A-L: 21.1 ml  RAESV MOD: 22.2 ml  RALs: 4.2 cm  RVIDd: 2 cm  RWT: 0.4  SV(Teich): 45.2 ml    CW  TR Vmax: 2.5 m/s  TR maxP.8 mmHg    MM  TAPSE: 2.8 cm    PW  E' Av.1 m/s  E' Lat: 0 m/s  E' Sept: 0.1 m/s  E/E' Avg: 10.3  E/E' Lat: 14.1  E/E' Sept: 8.1  MV A Adeel: 0.7 m/s  MV Dec [place]: 2.4 m/s2  MV DecT: 222.2 ms  MV E Adeel: 0.5 m/s  MV E/A Ratio: 0.7  TV E' lat: 0.1 m/s    In[employer]oci[employer] Commission Accredited Echocardiography Laboratory    Prepared and electronically signed by    Lino Peterson D.O.  Signed 16-Mar-2021 16:56:32    No results found for this or any previous visit.    HOLTER  No results found for this or any previous visit.    Results for orders placed during the hospital encounter of 24    Holter monitor    Interpretation Summary  PT NAME: Sj Cruz  : 1955  AGE: 69 y.o.  GENDER: female  MRN: [id number]   PROCEDURE: Holter monitor      INDICATIONS: Palpitations    DESCRIPTION OF FINDINGS:  The patient was monitored for a total of 48 hours.  The patient was predominantly in sinus rhythm throughout the study.  The average heart rate was 73 beats per minute.  The heart rate ranged from a low of 53 beats per minute to a maximum of 117 beats per minute.    Ventricular ectopic activity consisted of 24 beats, of which all 24 were single PVCs. There was no sustained or nonsustained ventricular tachycardia.    Supraventricular ectopic activity consisted of 66 beats, of which 53 were  single PACs, 1 were late beats, and 8 were atrial couplets. There was 1 atrial run lasting 4 beats. There was no supraventricular tachycardia identified.  There was no evidence of atrial fibrillation or atrial flutter.    There were no significant pauses. The longest R-R interval was 1.4 seconds.  There was no evidence of advanced degree heart block.    The accompanying patient diary notes no symptoms.    Impression  Predominantly sinus rhythm, with an average heart rate of 73 beats per minute  Rare premature atrial contractions  Rare premature ventricular contractions  No significant pauses or advanced degree heart block      Reading physician: John Maravilla MD, [facility]      The 10-year ASCVD risk score (Karina CHANDLER, et al., 2019) is: 9.4%    Values used to calculate the score:      Age: 69 years      Sex: Female      Is Non- : No      Diabetic: No      Tobacco smoker: No      Systolic Blood Pressure: 138 mmHg      Is BP treated: Yes      HDL Cholesterol: 96 mg/dL      Total Cholesterol: 229 mg/dL      Assessment/Plan     Assessment/Plan:    Palpitations-I personally reviewed Holter monitor which showed rare PVCs and one 4 beat run of SVT.  Sustained arrhythmias.  No symptoms noted during Holter.  TSH obtained 2/26/4 within normal limits.  CBC without anemia.  Electrolytes within normal limits  HTN- blood pressure 130/92 today.  HLD- last lipid panel: , HDL 96, TG 58, .    Eloina is presenting with symptoms of palpitations and lightheadedness.  She recently had a Holter monitor done for 2 days did not have symptoms during this time there are no sustained arrhythmias to correlate with her symptoms of palpitations and lightheadedness.  Echocardiogram completed in 2021 which was within normal limits and all lab work reviewed above was within normal limits.  Physical exam is well within normal limits.  I do not see a structural or electrical problem with the heart leading to her  palpitations and lightheadedness.  Trial conservative measures initially, follow-up with her and at that time consider propranolol and repeating echocardiogram and consider extracardiac etiology for her palpitations and lightheadedness.    Plan:  - increase water consumption to at least 2L fluids a day  - Check blood pressures regularly to ensure blood pressure is controlled throughout the day.  - recommend 150 minutes of exercise weekly  - limit caffine consumption in coffee, soda, tea and energy drinks  - Return in 1 month, if symptoms not improved will consider propranolol and possible repeat echocardiogram for her symptoms.  - possibly could trial compression stockings as well.      Case discussed and reviewed with Dr. Peterson who agrees with my assessment and plan.    Thank you for involving us in the care of your patient.      Fidencio Crane, DO  Cardiology Fellow   PGY-4      ==========================================================================================    Epic/ Allscripts/Care Everywhere records reviewed    ** Please Note: Fluency DirectDictation voice to text software may have been used in the creation of this document. **

## 2024-05-02 ENCOUNTER — CONSULT (OUTPATIENT)
Dept: CARDIOLOGY CLINIC | Facility: CLINIC | Age: 69
End: 2024-05-02
Payer: MEDICARE

## 2024-05-02 VITALS
DIASTOLIC BLOOD PRESSURE: 92 MMHG | WEIGHT: 147 LBS | HEIGHT: 66 IN | HEART RATE: 75 BPM | SYSTOLIC BLOOD PRESSURE: 130 MMHG | BODY MASS INDEX: 23.63 KG/M2

## 2024-05-02 DIAGNOSIS — R00.0 TACHYCARDIA: ICD-10-CM

## 2024-05-02 DIAGNOSIS — R00.2 PALPITATIONS: ICD-10-CM

## 2024-05-02 PROCEDURE — 99214 OFFICE O/P EST MOD 30 MIN: CPT

## 2024-05-02 PROCEDURE — 93000 ELECTROCARDIOGRAM COMPLETE: CPT

## 2024-05-02 NOTE — PATIENT INSTRUCTIONS
Increase water consumption to about 2L a day  Increase activity  Check you blood pressure at home.  Avoid triggers such as caffeine, stress, excessive alcohol intake.

## 2024-05-07 ENCOUNTER — TELEPHONE (OUTPATIENT)
Age: 69
End: 2024-05-07

## 2024-05-07 NOTE — TELEPHONE ENCOUNTER
Pt called today and wanted to let Dr. Singh know that her hands are swollen and she can't close them. She is also having join pain and believes it could be the Atorvastatin. I did try to get her in for an appt, there was no availability today for anyone. But pt will call tomorrow early morning to see if anything becomes available. She still wanted to see if there would be anything that Dr. Singh can recommend. Please advise [phone] thank you.

## 2024-06-25 DIAGNOSIS — G89.29 CHRONIC LOW BACK PAIN WITHOUT SCIATICA, UNSPECIFIED BACK PAIN LATERALITY: ICD-10-CM

## 2024-06-25 DIAGNOSIS — M54.50 CHRONIC LOW BACK PAIN WITHOUT SCIATICA, UNSPECIFIED BACK PAIN LATERALITY: ICD-10-CM

## 2024-06-26 RX ORDER — IBUPROFEN 800 MG/1
TABLET ORAL
Qty: 60 TABLET | Refills: 5 | Status: SHIPPED | OUTPATIENT
Start: 2024-06-26

## 2024-07-21 DIAGNOSIS — I10 ESSENTIAL HYPERTENSION: ICD-10-CM

## 2024-07-21 RX ORDER — OLMESARTAN MEDOXOMIL 40 MG/1
40 TABLET ORAL DAILY
Qty: 90 TABLET | Refills: 1 | Status: SHIPPED | OUTPATIENT
Start: 2024-07-21

## 2024-07-26 DIAGNOSIS — F51.01 PRIMARY INSOMNIA: ICD-10-CM

## 2024-07-26 RX ORDER — ZOLPIDEM TARTRATE 10 MG/1
TABLET ORAL
Qty: 30 TABLET | Refills: 2 | Status: SHIPPED | OUTPATIENT
Start: 2024-07-26

## 2024-07-29 ENCOUNTER — OFFICE VISIT (OUTPATIENT)
Dept: FAMILY MEDICINE CLINIC | Facility: CLINIC | Age: 69
End: 2024-07-29
Payer: MEDICARE

## 2024-07-29 VITALS
TEMPERATURE: 97.4 F | RESPIRATION RATE: 16 BRPM | HEIGHT: 66 IN | HEART RATE: 78 BPM | BODY MASS INDEX: 23.85 KG/M2 | DIASTOLIC BLOOD PRESSURE: 82 MMHG | WEIGHT: 148.4 LBS | SYSTOLIC BLOOD PRESSURE: 132 MMHG

## 2024-07-29 DIAGNOSIS — Z12.11 SCREEN FOR COLON CANCER: ICD-10-CM

## 2024-07-29 DIAGNOSIS — E78.5 HYPERLIPIDEMIA, UNSPECIFIED HYPERLIPIDEMIA TYPE: ICD-10-CM

## 2024-07-29 DIAGNOSIS — I10 ESSENTIAL HYPERTENSION: ICD-10-CM

## 2024-07-29 DIAGNOSIS — M05.80 POLYARTHRITIS WITH POSITIVE RHEUMATOID FACTOR (HCC): ICD-10-CM

## 2024-07-29 DIAGNOSIS — L25.5 RHUS DERMATITIS: Primary | ICD-10-CM

## 2024-07-29 PROBLEM — M53.3 PAIN IN THE COCCYX: Status: RESOLVED | Noted: 2020-12-28 | Resolved: 2024-07-29

## 2024-07-29 PROCEDURE — 99214 OFFICE O/P EST MOD 30 MIN: CPT | Performed by: FAMILY MEDICINE

## 2024-07-29 PROCEDURE — G2211 COMPLEX E/M VISIT ADD ON: HCPCS | Performed by: FAMILY MEDICINE

## 2024-07-29 RX ORDER — BETAMETHASONE DIPROPIONATE 0.5 MG/G
CREAM TOPICAL 2 TIMES DAILY
Qty: 45 G | Refills: 1 | Status: SHIPPED | OUTPATIENT
Start: 2024-07-29

## 2024-07-29 RX ORDER — METHYLPREDNISOLONE 4 MG/1
TABLET ORAL
Qty: 21 EACH | Refills: 0 | Status: SHIPPED | OUTPATIENT
Start: 2024-07-29

## 2024-07-29 NOTE — PROGRESS NOTES
"Ambulatory Visit  Name: Sj Cruz      : 1955      MRN: [id number]  Encounter Provider: Loretta Singh MD  Encounter Date: 2024   Encounter department: [facility]    Assessment & Plan   1. Rhus dermatitis  -     methylPREDNISolone 4 MG tablet therapy pack; Use as directed on package  -     betamethasone, augmented, (DIPROLENE-AF) 0.05 % cream; Apply topically 2 (two) times a day  2. Polyarthritis with positive rheumatoid factor (HCC)  Assessment & Plan:  Controlled with as  needed  ibuprofen once  daily  3. Screen for colon cancer  -     Ambulatory Referral to Gastroenterology; Future  4. Essential hypertension  5. Hyperlipidemia, unspecified hyperlipidemia type  Assessment & Plan:  Lab , on lipitor,last  ldl 121       History of Present Illness     Patient presents with:  Poison Ivy: Patient in office for poison ivy onset Thursday.  Patient was assisting a neighbor with yard work and did not see the leaves of 3 is very itchy is mostly on her hands and her between her fingers she also is due for blood pressure check in a couple weeks and asked if we could do that while she is here today that way she does not have to come back in 2 weeks denies any chest pain or shortness of breath feels pretty well other than the poison due for lab and she will be going for that by the end of August           Review of Systems   Constitutional:  Negative for activity change, appetite change and fatigue.   Respiratory:  Negative for shortness of breath.    Cardiovascular:  Negative for chest pain.   Skin:  Positive for rash.        itching   Neurological:  Negative for dizziness, light-headedness and headaches.   Hematological:  Negative for adenopathy.       Objective     /82 (BP Location: Right arm, Patient Position: Sitting, Cuff Size: Adult)   Pulse 78   Temp (!) 97.4 °F (36.3 °C) (Temporal)   Resp 16   Ht 5' 6\" (1.676 m)   Wt 67.3 kg (148 lb 6.4 oz)   BMI 23.95 kg/m²     Physical " Exam  Vitals reviewed.   Constitutional:       Appearance: Normal appearance.   Neck:      Vascular: No carotid bruit.   Cardiovascular:      Rate and Rhythm: Normal rate and regular rhythm.      Pulses: Normal pulses.      Heart sounds: Normal heart sounds.   Pulmonary:      Effort: Pulmonary effort is normal.      Breath sounds: Normal breath sounds.   Musculoskeletal:      Right lower leg: No edema.      Left lower leg: No edema.   Lymphadenopathy:      Cervical: No cervical adenopathy.   Skin:     Findings: Rash present.      Comments: C/w rhus  dermatitis   Neurological:      Mental Status: She is alert.   Psychiatric:         Mood and Affect: Mood normal.       Administrative Statements

## 2024-10-14 ENCOUNTER — OFFICE VISIT (OUTPATIENT)
Dept: FAMILY MEDICINE CLINIC | Facility: CLINIC | Age: 69
End: 2024-10-14
Payer: MEDICARE

## 2024-10-14 ENCOUNTER — TELEPHONE (OUTPATIENT)
Dept: FAMILY MEDICINE CLINIC | Facility: CLINIC | Age: 69
End: 2024-10-14

## 2024-10-14 VITALS
HEART RATE: 79 BPM | TEMPERATURE: 97.7 F | DIASTOLIC BLOOD PRESSURE: 77 MMHG | BODY MASS INDEX: 24.04 KG/M2 | SYSTOLIC BLOOD PRESSURE: 120 MMHG | HEIGHT: 66 IN | WEIGHT: 149.6 LBS | OXYGEN SATURATION: 96 %

## 2024-10-14 DIAGNOSIS — H66.001 NON-RECURRENT ACUTE SUPPURATIVE OTITIS MEDIA OF RIGHT EAR WITHOUT SPONTANEOUS RUPTURE OF TYMPANIC MEMBRANE: Primary | ICD-10-CM

## 2024-10-14 DIAGNOSIS — Z12.11 SCREEN FOR COLON CANCER: ICD-10-CM

## 2024-10-14 DIAGNOSIS — Z78.0 POSTMENOPAUSAL: ICD-10-CM

## 2024-10-14 PROCEDURE — 99213 OFFICE O/P EST LOW 20 MIN: CPT | Performed by: FAMILY MEDICINE

## 2024-10-14 PROCEDURE — G2211 COMPLEX E/M VISIT ADD ON: HCPCS | Performed by: FAMILY MEDICINE

## 2024-10-14 RX ORDER — BENZONATATE 200 MG/1
200 CAPSULE ORAL 3 TIMES DAILY PRN
Qty: 30 CAPSULE | Refills: 0 | Status: SHIPPED | OUTPATIENT
Start: 2024-10-14 | End: 2024-10-14

## 2024-10-14 RX ORDER — DEXTROMETHORPHAN HYDROBROMIDE AND PROMETHAZINE HYDROCHLORIDE 15; 6.25 MG/5ML; MG/5ML
5 SYRUP ORAL 4 TIMES DAILY PRN
Qty: 120 ML | Refills: 1 | Status: SHIPPED | OUTPATIENT
Start: 2024-10-14

## 2024-10-14 RX ORDER — AZITHROMYCIN 250 MG/1
TABLET, FILM COATED ORAL
Qty: 6 TABLET | Refills: 0 | Status: SHIPPED | OUTPATIENT
Start: 2024-10-14 | End: 2024-10-18

## 2024-10-14 NOTE — TELEPHONE ENCOUNTER
Pt came into the office today in regards of her  benzonatate 200 MG Tessalon and pt states the pharmacy will not be covering the medication from her insurances, pt is requesting an different medication to be sent to [facility]/pharmacy #[id number] - [place] - [address]   [address], [place] [address].     Please place in new medication as requested.     Thank you,   Jesica

## 2024-10-14 NOTE — PROGRESS NOTES
Assessment/Plan:    No problem-specific Assessment & Plan notes found for this encounter.       Diagnoses and all orders for this visit:    Non-recurrent acute suppurative otitis media of right ear without spontaneous rupture of tympanic membrane  -     azithromycin (ZITHROMAX) 250 mg tablet; 2 1st day, 1/d  for 4 days  -     benzonatate (TESSALON) 200 MG capsule; Take 1 capsule (200 mg total) by mouth 3 (three) times a day as needed for cough    Postmenopausal  -     DXA bone density spine hip and pelvis; Future    Screen for colon cancer  -     Ambulatory Referral to Gastroenterology; Future    Other orders  -     Cancel: Ambulatory Referral to Gastroenterology; Future          Subjective:      Patient ID: Sj Cruz is a 69 y.o. female.    Patient presents with:  Sore Throat  Cough  Nasal Congestion  Symptoms: Per pt started on saturday, pt states went to urgent care last night. Negative for StrepA   GRANDSON HAD PNEUMONIA    Sore Throat   This is a new problem. The current episode started in the past 7 days. The problem has been waxing and waning. There has been no fever. The pain is at a severity of 7/10. The pain is moderate. Associated symptoms include congestion, coughing, ear pain and swollen glands. Pertinent negatives include no headaches. She has tried cool liquids for the symptoms. The treatment provided mild relief.       The following portions of the patient's history were reviewed and updated as appropriate: allergies, current medications, past family history, past medical history, past social history, past surgical history, and problem list.    Review of Systems   Constitutional:  Positive for fatigue. Negative for activity change, appetite change, chills and fever.   HENT:  Positive for congestion, ear pain and rhinorrhea. Negative for sinus pressure and sinus pain.    Respiratory:  Positive for cough.    Neurological:  Negative for dizziness, light-headedness and headaches.   Hematological:   "Positive for adenopathy.         Objective:      /77 (BP Location: Right arm, Patient Position: Sitting, Cuff Size: Adult)   Pulse 79   Temp 97.7 °F (36.5 °C) (Tympanic)   Ht 5' 6\" (1.676 m)   Wt 67.9 kg (149 lb 9.6 oz)   SpO2 96%   BMI 24.15 kg/m²          Physical Exam  Vitals reviewed.   Constitutional:       Appearance: She is well-developed.   HENT:      Right Ear: Tympanic membrane is erythematous.      Left Ear: Tympanic membrane normal.      Nose: Congestion present. No rhinorrhea.      Mouth/Throat:      Pharynx: No oropharyngeal exudate or posterior oropharyngeal erythema.   Neck:      Comments: R  ant chain  Pulmonary:      Effort: Pulmonary effort is normal.      Breath sounds: Normal breath sounds.   Lymphadenopathy:      Cervical: Cervical adenopathy present.   Neurological:      Mental Status: She is alert.            "

## 2024-10-25 DIAGNOSIS — F51.01 PRIMARY INSOMNIA: ICD-10-CM

## 2024-10-25 RX ORDER — ZOLPIDEM TARTRATE 10 MG/1
TABLET ORAL
Qty: 30 TABLET | Refills: 2 | Status: SHIPPED | OUTPATIENT
Start: 2024-10-25

## 2024-11-19 NOTE — TELEPHONE ENCOUNTER
Already sent other rx
Dr Lj Martin, what is your second choice for cough medicine?
If she is referring to cough med with codeine one prep is on back order but can rx a different prep   However if she is not better in 1 week needs ov
PT HAS HAD A CALL FOR OVER A WEEK AND IS ASKING IF MF WILL CALL HER COUGH MEDICATION IN FOR HER  SHE IS AT WORK AND DOESN'T KNOW WHAT IT IS, BUT SHE SAID ITS THE ONE SHE TAKES AT NIGHT TO HELP HER SLEEP  PT REFUSED AN APPT WITH MF, SHE SAID SHE JUST WANTS THE COUGH SYRUP     PT WOULD LIKE A CALL BACK [phone]
Patient aware
Please review and advise
complains of pain/discomfort

## 2024-12-20 ENCOUNTER — OFFICE VISIT (OUTPATIENT)
Dept: FAMILY MEDICINE CLINIC | Facility: CLINIC | Age: 69
End: 2024-12-20
Payer: MEDICARE

## 2024-12-20 VITALS
OXYGEN SATURATION: 97 % | RESPIRATION RATE: 20 BRPM | DIASTOLIC BLOOD PRESSURE: 80 MMHG | HEART RATE: 83 BPM | WEIGHT: 149 LBS | TEMPERATURE: 97.8 F | SYSTOLIC BLOOD PRESSURE: 136 MMHG | BODY MASS INDEX: 23.95 KG/M2 | HEIGHT: 66 IN

## 2024-12-20 DIAGNOSIS — J02.9 SORE THROAT: Primary | ICD-10-CM

## 2024-12-20 LAB
SARS-COV-2 AG UPPER RESP QL IA: NEGATIVE
VALID CONTROL: NORMAL

## 2024-12-20 PROCEDURE — 99214 OFFICE O/P EST MOD 30 MIN: CPT | Performed by: NURSE PRACTITIONER

## 2024-12-20 PROCEDURE — G2211 COMPLEX E/M VISIT ADD ON: HCPCS | Performed by: NURSE PRACTITIONER

## 2024-12-20 PROCEDURE — 87811 SARS-COV-2 COVID19 W/OPTIC: CPT | Performed by: NURSE PRACTITIONER

## 2024-12-20 RX ORDER — BENZONATATE 200 MG/1
200 CAPSULE ORAL 3 TIMES DAILY PRN
Qty: 20 CAPSULE | Refills: 0 | Status: SHIPPED | OUTPATIENT
Start: 2024-12-20

## 2024-12-20 RX ORDER — DEXTROMETHORPHAN HYDROBROMIDE AND PROMETHAZINE HYDROCHLORIDE 15; 6.25 MG/5ML; MG/5ML
5 SYRUP ORAL 4 TIMES DAILY PRN
Qty: 120 ML | Refills: 1 | Status: SHIPPED | OUTPATIENT
Start: 2024-12-20

## 2024-12-20 NOTE — PROGRESS NOTES
"Name: Sj Cruz      : 1955      MRN: [id number]  Encounter Provider: LEBRON Godoy  Encounter Date: 2024   Encounter department: [facility]  :  Assessment & Plan  Sore throat  Covid negative     Viral symptomatic care recommended   Salt water gargles   Push fluids     Orders:  •  POCT Rapid Covid Ag  •  promethazine-dextromethorphan (PHENERGAN-DM) 6.25-15 mg/5 mL oral syrup; Take 5 mL by mouth 4 (four) times a day as needed for cough  •  benzonatate (TESSALON) 200 MG capsule; Take 1 capsule (200 mg total) by mouth 3 (three) times a day as needed for cough          Depression Screening and Follow-up Plan: Patient was screened for depression during today's encounter. They screened negative with a PHQ-2 score of 0.      History of Present Illness     Patient reports yesterday she started with nasal drip  She has a cough that is dry   The nasal drip stopped  She is congested, ears feel blocked   Used flonase last night       Review of Systems   Constitutional:  Negative for chills, diaphoresis and fever.   HENT:  Positive for congestion, ear pain, postnasal drip and sore throat (resolved). Negative for rhinorrhea, sinus pressure and sinus pain.    Respiratory:  Positive for cough.    Gastrointestinal:  Negative for nausea and vomiting.   Neurological:  Negative for dizziness and headaches.       Objective   /80 (BP Location: Left arm, Patient Position: Sitting, Cuff Size: Standard)   Pulse 83   Temp 97.8 °F (36.6 °C) (Tympanic)   Resp 20   Ht 5' 6\" (1.676 m)   Wt 67.6 kg (149 lb)   SpO2 97%   BMI 24.05 kg/m²      Physical Exam  Vitals and nursing note reviewed.   Constitutional:       General: She is not in acute distress.     Appearance: Normal appearance. She is ill-appearing. She is not diaphoretic.   HENT:      Head: Normocephalic and atraumatic.      Right Ear: External ear normal.      Left Ear: External ear normal. There is impacted cerumen.      Nose: " No rhinorrhea.      Mouth/Throat:      Pharynx: Posterior oropharyngeal erythema and postnasal drip present.   Eyes:      Extraocular Movements: Extraocular movements intact.      Conjunctiva/sclera: Conjunctivae normal.   Cardiovascular:      Rate and Rhythm: Normal rate and regular rhythm.      Heart sounds: Normal heart sounds, S1 normal and S2 normal. No murmur heard.  Pulmonary:      Effort: Pulmonary effort is normal.      Breath sounds: Normal breath sounds. No wheezing or rhonchi.   Neurological:      Mental Status: She is alert and oriented to person, place, and time.   Psychiatric:         Mood and Affect: Mood normal.         Behavior: Behavior normal.         Thought Content: Thought content normal.         Judgment: Judgment normal.

## 2024-12-21 DIAGNOSIS — M54.50 CHRONIC LOW BACK PAIN WITHOUT SCIATICA, UNSPECIFIED BACK PAIN LATERALITY: ICD-10-CM

## 2024-12-21 DIAGNOSIS — G89.29 CHRONIC LOW BACK PAIN WITHOUT SCIATICA, UNSPECIFIED BACK PAIN LATERALITY: ICD-10-CM

## 2024-12-23 ENCOUNTER — TELEPHONE (OUTPATIENT)
Age: 69
End: 2024-12-23

## 2024-12-23 DIAGNOSIS — J02.9 SORE THROAT: Primary | ICD-10-CM

## 2024-12-23 RX ORDER — IBUPROFEN 800 MG/1
TABLET, FILM COATED ORAL
Qty: 60 TABLET | Refills: 5 | Status: SHIPPED | OUTPATIENT
Start: 2024-12-23

## 2024-12-23 RX ORDER — AZITHROMYCIN 250 MG/1
TABLET, FILM COATED ORAL
Qty: 6 TABLET | Refills: 0 | Status: SHIPPED | OUTPATIENT
Start: 2024-12-23 | End: 2024-12-28

## 2024-12-23 NOTE — TELEPHONE ENCOUNTER
Patient called in stating she was last Tuesday and was hold if not feeling any better to call back to request for Z-pack. She mentioned not recovering from cough have nasal drips and ears are blocked too. Kindly help to call in prescription to her regular pharmacy and keep her informed. She requested to call her to update on the same. Thanks

## 2025-01-19 DIAGNOSIS — I10 ESSENTIAL HYPERTENSION: ICD-10-CM

## 2025-01-20 RX ORDER — OLMESARTAN MEDOXOMIL 40 MG/1
40 TABLET ORAL DAILY
Qty: 90 TABLET | Refills: 1 | Status: SHIPPED | OUTPATIENT
Start: 2025-01-20

## 2025-01-23 ENCOUNTER — OFFICE VISIT (OUTPATIENT)
Dept: FAMILY MEDICINE CLINIC | Facility: CLINIC | Age: 70
End: 2025-01-23
Payer: MEDICARE

## 2025-01-23 VITALS
WEIGHT: 149 LBS | HEIGHT: 66 IN | SYSTOLIC BLOOD PRESSURE: 128 MMHG | OXYGEN SATURATION: 96 % | HEART RATE: 82 BPM | DIASTOLIC BLOOD PRESSURE: 74 MMHG | BODY MASS INDEX: 23.95 KG/M2 | TEMPERATURE: 97.8 F | RESPIRATION RATE: 16 BRPM

## 2025-01-23 DIAGNOSIS — E78.2 MIXED HYPERLIPIDEMIA: ICD-10-CM

## 2025-01-23 DIAGNOSIS — Z12.31 ENCOUNTER FOR SCREENING MAMMOGRAM FOR MALIGNANT NEOPLASM OF BREAST: ICD-10-CM

## 2025-01-23 DIAGNOSIS — M05.80 POLYARTHRITIS WITH POSITIVE RHEUMATOID FACTOR (HCC): ICD-10-CM

## 2025-01-23 DIAGNOSIS — Z78.0 POSTMENOPAUSAL: ICD-10-CM

## 2025-01-23 DIAGNOSIS — I10 ESSENTIAL HYPERTENSION: ICD-10-CM

## 2025-01-23 DIAGNOSIS — Z00.00 MEDICARE ANNUAL WELLNESS VISIT, SUBSEQUENT: Primary | ICD-10-CM

## 2025-01-23 DIAGNOSIS — F51.01 PRIMARY INSOMNIA: ICD-10-CM

## 2025-01-23 PROCEDURE — G0438 PPPS, INITIAL VISIT: HCPCS | Performed by: FAMILY MEDICINE

## 2025-01-23 PROCEDURE — G2211 COMPLEX E/M VISIT ADD ON: HCPCS | Performed by: FAMILY MEDICINE

## 2025-01-23 PROCEDURE — 99214 OFFICE O/P EST MOD 30 MIN: CPT | Performed by: FAMILY MEDICINE

## 2025-01-23 RX ORDER — ZOLPIDEM TARTRATE 10 MG/1
10 TABLET ORAL
Qty: 30 TABLET | Refills: 2 | Status: SHIPPED | OUTPATIENT
Start: 2025-01-23

## 2025-01-23 RX ORDER — ATORVASTATIN CALCIUM 10 MG/1
10 TABLET, FILM COATED ORAL 3 TIMES WEEKLY
Qty: 36 TABLET | Refills: 1 | Status: SHIPPED | OUTPATIENT
Start: 2025-01-24

## 2025-01-23 NOTE — ASSESSMENT & PLAN NOTE
Orders:    zolpidem (AMBIEN) 10 mg tablet; Take 1 tablet (10 mg total) by mouth daily at bedtime as needed for sleep  sleeping well on ambien

## 2025-01-23 NOTE — PROGRESS NOTES
Name: Sj Cruz      : 1955      MRN: [id number]  Encounter Provider: Loretta Singh MD  Encounter Date: 2025   Enc[facility] department[facility]    Assessment & Plan  Primary insomnia    Orders:    zolpidem (AMBIEN) 10 mg tablet; Take 1 tablet (10 mg total) by mouth daily at bedtime as needed for sleep  sleeping well on ambien  Encounter for screening mammogram for malignant neoplasm of breast    Orders:    Mammo screening bilateral w 3d and cad; Future    Medicare annual wellness visit, subsequent         Polyarthritis with positive rheumatoid factor (HCC)  Uses  voltaren gel as  needed         Essential hypertension  BP stable on Benicar         Postmenopausal    Orders:    DXA bone density spine hip and pelvis; Future       Preventive health issues were discussed with patient, and age appropriate screening tests were ordered as noted in patient's After Visit Summary. Personalized health advice and appropriate referrals for health education or preventive services given if needed, as noted in patient's After Visit Summary.    History of Present Illness     Patient presents with:  Medicare Wellness Visit   Needs  refill on ambien       Patient Care Team:  Loretta Singh MD as PCP - General    Review of Systems   Constitutional:  Negative for activity change, appetite change and fatigue.   Respiratory:  Negative for shortness of breath.    Cardiovascular:  Negative for chest pain.   Musculoskeletal:  Negative for arthralgias.   Neurological:  Negative for dizziness, light-headedness and headaches.   Hematological:  Negative for adenopathy.   Psychiatric/Behavioral:  Positive for sleep disturbance.      Medical History Reviewed by provider this encounter:  Tobacco  Allergies  Meds  Problems  Med Hx  Surg Hx  Fam Hx       Annual Wellness Visit Questionnaire   Sj is here for her Subsequent Wellness visit.     Health Risk Assessment:   Patient rates overall health as very good.  Patient feels that their physical health rating is much better. Patient is very satisfied with their life. Eyesight was rated as same. Hearing was rated as same. Patient feels that their emotional and mental health rating is same. Patients states they are never, rarely angry. Patient states they are never, rarely unusually tired/fatigued. Pain experienced in the last 7 days has been none. Patient states that she has experienced no weight loss or gain in last 6 months.     Fall Risk Screening:   In the past year, patient has experienced: no history of falling in past year      Urinary Incontinence Screening:   Patient has not leaked urine accidently in the last six months.     Home Safety:  Patient does not have trouble with stairs inside or outside of their home. Patient has working smoke alarms and has no working carbon monoxide detector. Home safety hazards include: none.     Nutrition:   Current diet is Regular.     Medications:   Patient is not currently taking any over-the-counter supplements. Patient is able to manage medications.     Activities of Daily Living (ADLs)/Instrumental Activities of Daily Living (IADLs):   Walk and transfer into and out of bed and chair?: Yes  Dress and groom yourself?: Yes    Bathe or shower yourself?: Yes    Feed yourself? Yes  Do your laundry/housekeeping?: Yes  Manage your money, pay your bills and track your expenses?: Yes  Make your own meals?: Yes    Do your own shopping?: Yes    Previous Hospitalizations:   Any hospitalizations or ED visits within the last 12 months?: No      Advance Care Planning:   Living will: No    Durable POA for healthcare: No    Advanced directive: Yes      Comments:   would  be  POA    Cognitive Screening:   Provider or family/friend/caregiver concerned regarding cognition?: No    PREVENTIVE SCREENINGS      Cardiovascular Screening:    General: History Lipid Disorder and Risks and Benefits Discussed    Due for: Lipid Panel      Diabetes  Screening:     General: Screening Current      Colorectal Cancer Screening:     General: Screening Current      Breast Cancer Screening:     General: Screening Current      Cervical Cancer Screening:    General: Screening Not Indicated      Osteoporosis Screening:    General: Risks and Benefits Discussed    Due for: DXA Axial      Abdominal Aortic Aneurysm (AAA) Screening:        General: Screening Not Indicated      Lung Cancer Screening:     General: Screening Not Indicated      Hepatitis C Screening:    General: Screening Current    Screening, Brief Intervention, and Referral to Treatment (SBIRT)    Screening  Typical number of drinks in a day: 0  Typical number of drinks in a week: 3  Interpretation: Low risk drinking behavior.    AUDIT-C Screenin) How often did you have a drink containing alcohol in the past year? monthly or less  2) How many drinks did you have on a typical day when you were drinking in the past year? 0  3) How often did you have 6 or more drinks on one occasion in the past year? less than monthly    AUDIT-C Score: 2  Interpretation: Score 0-2 (female): Negative screen for alcohol misuse    Single Item Drug Screening:  How often have you used an illegal drug (including marijuana) or a prescription medication for non-medical reasons in the past year? never    Single Item Drug Screen Score: 0  Interpretation: Negative screen for possible drug use disorder    Social Drivers of Health     Financial Resource Strain: Low Risk  (2023)    Overall Financial Resource Strain (CARDIA)     Difficulty of Paying Living Expenses: Not hard at all   Food Insecurity: No Food Insecurity (2025)    Hunger Vital Sign     Worried About Running Out of Food in the Last Year: Never true     Ran Out of Food in the Last Year: Never true   Transportation Needs: No Transportation Needs (2025)    PRAPARE - Transportation     Lack of Transportation (Medical): No     Lack of Transportation (Non-Medical): No  "  Housing Stability: Low Risk  (1/23/2025)    Housing Stability Vital Sign     Unable to Pay for Housing in the Last Year: No     Number of Times Moved in the Last Year: 0     Homeless in the Last Year: No   Utilities: Not At Risk (1/23/2025)    [facility] Utilities     Threatened with loss of utilities: No     No results found.    Objective   /74 (BP Location: Left arm, Patient Position: Standing, Cuff Size: Standard)   Pulse 82   Temp 97.8 °F (36.6 °C) (Temporal)   Resp 16   Ht 5' 6\" (1.676 m)   Wt 67.6 kg (149 lb)   SpO2 96%   BMI 24.05 kg/m²     Physical Exam  Vitals reviewed.   Constitutional:       Appearance: Normal appearance.   Neck:      Vascular: No carotid bruit.   Cardiovascular:      Rate and Rhythm: Normal rate and regular rhythm.      Pulses: Normal pulses.      Heart sounds: Normal heart sounds.   Pulmonary:      Effort: Pulmonary effort is normal.      Breath sounds: Normal breath sounds.   Musculoskeletal:      Right lower leg: No edema.      Left lower leg: No edema.   Lymphadenopathy:      Cervical: No cervical adenopathy.   Neurological:      Mental Status: She is alert.   Psychiatric:         Mood and Affect: Mood normal.         "

## 2025-02-17 ENCOUNTER — TELEPHONE (OUTPATIENT)
Age: 70
End: 2025-02-17

## 2025-02-17 ENCOUNTER — PREP FOR PROCEDURE (OUTPATIENT)
Age: 70
End: 2025-02-17

## 2025-02-17 DIAGNOSIS — Z86.0100 HISTORY OF COLON POLYPS: Primary | ICD-10-CM

## 2025-02-17 NOTE — LETTER
Sj Cruz  [address] [id number]                              ------------------------------------------------------------------------------------------------------------

## 2025-02-17 NOTE — TELEPHONE ENCOUNTER
02/17/25  Screened by: Maegan Bhatt    Referring Provider     Pre- Screening:     There is no height or weight on file to calculate BMI.  Has patient been referred for a routine screening Colonoscopy? yes  Is the patient between 45-75 years old? yes      Previous Colonoscopy yes   If yes:    Date: 1/5/21    Facility: [facility]    Reason: HX of pylops          Does the patient want to see a Gastroenterologist prior to their procedure OR are they having any GI symptoms? no    Has the patient been hospitalized or had abdominal surgery in the past 6 months? no    Does the patient use supplemental oxygen? no    Does the patient take Coumadin, Lovenox, Plavix, Elliquis, Xarelto, or other blood thinning medication? no    Has the patient had a stroke, cardiac event, or stent placed in the past year? no        If patient is between 45yrs - 49yrs, please advise patient that we will have to confirm benefits & coverage with their insurance company for a routine screening colonoscopy.

## 2025-02-17 NOTE — TELEPHONE ENCOUNTER
Scheduled date of colonoscopy (as of today):4/24/2025  Physician performing colonoscopy:   Location of colonoscopy: [facility]  Bowel prep reviewed with patient: Maegan Bhatt  Instructions reviewed with patient by: Maegan Bhatt  Clearances: N/A        Jerry Dul prep sent via [employer]

## 2025-03-17 ENCOUNTER — HOSPITAL ENCOUNTER (OUTPATIENT)
Dept: BONE DENSITY | Facility: CLINIC | Age: 70
Discharge: HOME | End: 2025-03-17
Payer: MEDICARE

## 2025-03-17 ENCOUNTER — HOSPITAL ENCOUNTER (OUTPATIENT)
Dept: MAMMOGRAPHY | Facility: CLINIC | Age: 70
Discharge: HOME | End: 2025-03-17
Payer: MEDICARE

## 2025-03-17 VITALS — HEIGHT: 66 IN | WEIGHT: 149 LBS | BODY MASS INDEX: 23.95 KG/M2

## 2025-03-17 DIAGNOSIS — Z78.0 POSTMENOPAUSAL: ICD-10-CM

## 2025-03-17 DIAGNOSIS — Z12.31 ENCOUNTER FOR SCREENING MAMMOGRAM FOR MALIGNANT NEOPLASM OF BREAST: ICD-10-CM

## 2025-03-17 PROCEDURE — 77063 BREAST TOMOSYNTHESIS BI: CPT

## 2025-03-17 PROCEDURE — 77067 SCR MAMMO BI INCL CAD: CPT

## 2025-03-17 PROCEDURE — 77080 DXA BONE DENSITY AXIAL: CPT

## 2025-03-20 ENCOUNTER — RESULTS FOLLOW-UP (OUTPATIENT)
Dept: FAMILY MEDICINE CLINIC | Facility: CLINIC | Age: 70
End: 2025-03-20

## 2025-03-31 ENCOUNTER — OFFICE VISIT (OUTPATIENT)
Dept: FAMILY MEDICINE CLINIC | Facility: CLINIC | Age: 70
End: 2025-03-31
Payer: MEDICARE

## 2025-03-31 VITALS
WEIGHT: 148 LBS | OXYGEN SATURATION: 98 % | BODY MASS INDEX: 23.78 KG/M2 | TEMPERATURE: 98.1 F | HEIGHT: 66 IN | SYSTOLIC BLOOD PRESSURE: 116 MMHG | DIASTOLIC BLOOD PRESSURE: 84 MMHG | HEART RATE: 82 BPM

## 2025-03-31 DIAGNOSIS — H66.001 NON-RECURRENT ACUTE SUPPURATIVE OTITIS MEDIA OF RIGHT EAR WITHOUT SPONTANEOUS RUPTURE OF TYMPANIC MEMBRANE: Primary | ICD-10-CM

## 2025-03-31 PROCEDURE — 99213 OFFICE O/P EST LOW 20 MIN: CPT | Performed by: FAMILY MEDICINE

## 2025-03-31 PROCEDURE — G2211 COMPLEX E/M VISIT ADD ON: HCPCS | Performed by: FAMILY MEDICINE

## 2025-03-31 RX ORDER — AZITHROMYCIN 250 MG/1
TABLET, FILM COATED ORAL
Qty: 6 TABLET | Refills: 0 | Status: SHIPPED | OUTPATIENT
Start: 2025-03-31 | End: 2025-04-04

## 2025-03-31 NOTE — PROGRESS NOTES
"Name: Sj Cruz      : 1955      MRN: [id number]  Encounter Provider: Loretta Singh MD  Encounter Date: 3/31/2025   Encounter department: [facility] PRIMARY [facility]  :  Assessment & Plan  Non-recurrent acute suppurative otitis media of right ear without spontaneous rupture of tympanic membrane    Orders:    azithromycin (ZITHROMAX) 250 mg tablet; 2 1st day, 1/d for  4  days           History of Present Illness   Patient presents with:  Sinusitis: Onset 1 week.  Earache: Right ear blocked.  She is going away for 10 days.leaving Thursday  afternoon for [place]         Review of Systems   Constitutional:  Negative for activity change, appetite change and fatigue.   HENT:  Positive for ear pain, sinus pressure and sinus pain. Negative for postnasal drip, rhinorrhea and sore throat.    Respiratory:  Negative for shortness of breath.    Cardiovascular:  Negative for chest pain.   Neurological:  Negative for dizziness, light-headedness and headaches.   Hematological:  Negative for adenopathy.       Objective   /84   Pulse 82   Temp 98.1 °F (36.7 °C)   Ht 5' 6\" (1.676 m)   Wt 67.1 kg (148 lb)   SpO2 98%   BMI 23.89 kg/m²      Physical Exam  Vitals reviewed.   Constitutional:       Appearance: Normal appearance.   HENT:      Right Ear: Tympanic membrane is erythematous.      Left Ear: There is impacted cerumen.      Nose:      Right Sinus: No maxillary sinus tenderness or frontal sinus tenderness.      Left Sinus: No maxillary sinus tenderness or frontal sinus tenderness.      Mouth/Throat:      Pharynx: No oropharyngeal exudate or posterior oropharyngeal erythema.   Cardiovascular:      Rate and Rhythm: Normal rate and regular rhythm.      Pulses: Normal pulses.      Heart sounds: Normal heart sounds.   Pulmonary:      Effort: Pulmonary effort is normal.      Breath sounds: Normal breath sounds.   Lymphadenopathy:      Cervical: No cervical adenopathy.   Neurological:      Mental Status: She is " alert.   Psychiatric:         Mood and Affect: Mood normal.

## 2025-04-23 RX ORDER — ALBUTEROL SULFATE 0.83 MG/ML
2.5 SOLUTION RESPIRATORY (INHALATION) ONCE AS NEEDED
Status: CANCELLED | OUTPATIENT
Start: 2025-04-23

## 2025-04-23 RX ORDER — ONDANSETRON 2 MG/ML
4 INJECTION INTRAMUSCULAR; INTRAVENOUS ONCE AS NEEDED
Status: CANCELLED | OUTPATIENT
Start: 2025-04-23

## 2025-04-23 RX ORDER — SODIUM CHLORIDE, SODIUM LACTATE, POTASSIUM CHLORIDE, CALCIUM CHLORIDE 600; 310; 30; 20 MG/100ML; MG/100ML; MG/100ML; MG/100ML
125 INJECTION, SOLUTION INTRAVENOUS CONTINUOUS
Status: CANCELLED | OUTPATIENT
Start: 2025-04-23

## 2025-04-24 ENCOUNTER — HOSPITAL ENCOUNTER (OUTPATIENT)
Dept: GASTROENTEROLOGY | Facility: HOSPITAL | Age: 70
Setting detail: OUTPATIENT SURGERY
End: 2025-04-24
Attending: INTERNAL MEDICINE
Payer: MEDICARE

## 2025-04-24 ENCOUNTER — ANESTHESIA (OUTPATIENT)
Dept: GASTROENTEROLOGY | Facility: HOSPITAL | Age: 70
End: 2025-04-24
Payer: MEDICARE

## 2025-04-24 ENCOUNTER — ANESTHESIA EVENT (OUTPATIENT)
Dept: GASTROENTEROLOGY | Facility: HOSPITAL | Age: 70
End: 2025-04-24
Payer: MEDICARE

## 2025-04-24 VITALS
RESPIRATION RATE: 16 BRPM | DIASTOLIC BLOOD PRESSURE: 78 MMHG | OXYGEN SATURATION: 98 % | TEMPERATURE: 97.1 F | SYSTOLIC BLOOD PRESSURE: 110 MMHG | HEART RATE: 71 BPM

## 2025-04-24 DIAGNOSIS — F51.01 PRIMARY INSOMNIA: ICD-10-CM

## 2025-04-24 DIAGNOSIS — Z86.0100 HISTORY OF COLON POLYPS: ICD-10-CM

## 2025-04-24 PROCEDURE — 88305 TISSUE EXAM BY PATHOLOGIST: CPT | Performed by: PATHOLOGY

## 2025-04-24 PROCEDURE — 45385 COLONOSCOPY W/LESION REMOVAL: CPT | Performed by: INTERNAL MEDICINE

## 2025-04-24 RX ORDER — LIDOCAINE HYDROCHLORIDE 20 MG/ML
INJECTION, SOLUTION EPIDURAL; INFILTRATION; INTRACAUDAL; PERINEURAL AS NEEDED
Status: DISCONTINUED | OUTPATIENT
Start: 2025-04-24 | End: 2025-04-24

## 2025-04-24 RX ORDER — ALBUTEROL SULFATE 0.83 MG/ML
2.5 SOLUTION RESPIRATORY (INHALATION) ONCE AS NEEDED
Status: DISCONTINUED | OUTPATIENT
Start: 2025-04-24 | End: 2025-04-28 | Stop reason: HOSPADM

## 2025-04-24 RX ORDER — ZOLPIDEM TARTRATE 10 MG/1
10 TABLET ORAL
Qty: 30 TABLET | Refills: 3 | Status: SHIPPED | OUTPATIENT
Start: 2025-04-24

## 2025-04-24 RX ORDER — EPHEDRINE SULFATE 50 MG/ML
INJECTION INTRAVENOUS AS NEEDED
Status: DISCONTINUED | OUTPATIENT
Start: 2025-04-24 | End: 2025-04-24

## 2025-04-24 RX ORDER — ONDANSETRON 2 MG/ML
INJECTION INTRAMUSCULAR; INTRAVENOUS AS NEEDED
Status: DISCONTINUED | OUTPATIENT
Start: 2025-04-24 | End: 2025-04-24

## 2025-04-24 RX ORDER — PROPOFOL 10 MG/ML
INJECTION, EMULSION INTRAVENOUS AS NEEDED
Status: DISCONTINUED | OUTPATIENT
Start: 2025-04-24 | End: 2025-04-24

## 2025-04-24 RX ORDER — PROPOFOL 10 MG/ML
INJECTION, EMULSION INTRAVENOUS CONTINUOUS PRN
Status: DISCONTINUED | OUTPATIENT
Start: 2025-04-24 | End: 2025-04-24

## 2025-04-24 RX ORDER — ONDANSETRON 2 MG/ML
4 INJECTION INTRAMUSCULAR; INTRAVENOUS ONCE AS NEEDED
Status: DISCONTINUED | OUTPATIENT
Start: 2025-04-24 | End: 2025-04-28 | Stop reason: HOSPADM

## 2025-04-24 RX ORDER — SODIUM CHLORIDE, SODIUM LACTATE, POTASSIUM CHLORIDE, CALCIUM CHLORIDE 600; 310; 30; 20 MG/100ML; MG/100ML; MG/100ML; MG/100ML
125 INJECTION, SOLUTION INTRAVENOUS CONTINUOUS
Status: DISCONTINUED | OUTPATIENT
Start: 2025-04-24 | End: 2025-04-28 | Stop reason: HOSPADM

## 2025-04-24 RX ADMIN — LIDOCAINE HYDROCHLORIDE 100 MG: 20 INJECTION, SOLUTION EPIDURAL; INFILTRATION; INTRACAUDAL at 14:15

## 2025-04-24 RX ADMIN — PROPOFOL 100 MG: 10 INJECTION, EMULSION INTRAVENOUS at 14:15

## 2025-04-24 RX ADMIN — PROPOFOL 30 MG: 10 INJECTION, EMULSION INTRAVENOUS at 14:29

## 2025-04-24 RX ADMIN — PROPOFOL 100 MCG/KG/MIN: 10 INJECTION, EMULSION INTRAVENOUS at 14:17

## 2025-04-24 RX ADMIN — PROPOFOL 20 MG: 10 INJECTION, EMULSION INTRAVENOUS at 14:19

## 2025-04-24 RX ADMIN — EPHEDRINE SULFATE 10 MG: 50 INJECTION INTRAVENOUS at 14:27

## 2025-04-24 RX ADMIN — ONDANSETRON 4 MG: 2 INJECTION INTRAMUSCULAR; INTRAVENOUS at 14:20

## 2025-04-24 RX ADMIN — SODIUM CHLORIDE, SODIUM LACTATE, POTASSIUM CHLORIDE, AND CALCIUM CHLORIDE 125 ML/HR: .6; .31; .03; .02 INJECTION, SOLUTION INTRAVENOUS at 12:52

## 2025-04-24 NOTE — ANESTHESIA POSTPROCEDURE EVALUATION
Post-Op Assessment Note    CV Status:  Stable  Pain Score: 0    Pain management: adequate       Mental Status:  Awake   Hydration Status:  Stable   PONV Controlled:  None   Airway Patency:  Patent     Post Op Vitals Reviewed: Yes    No anethesia notable event occurred.    Staff: CRNA           Last Filed PACU Vitals:  Vitals Value Taken Time   Temp 97.1 °F (36.2 °C) 04/24/25 1447   Pulse 81 04/24/25 1447   /76 04/24/25 1447   Resp 16 04/24/25 1447   SpO2 99 % 04/24/25 1447       Modified Mushtaq:     Vitals Value Taken Time   Activity 2 04/24/25 1447   Respiration 2 04/24/25 1447   Circulation 2 04/24/25 1447   Consciousness 1 04/24/25 1447   Oxygen Saturation 2 04/24/25 1447     Modified Mushtaq Score: 9

## 2025-04-24 NOTE — H&P
History and Physical - [facility]  Sj Cruz 70 y.o. female MRN: [id number]                  HPI: Sj Cruz is a 70 y.o. year old female who presents for colonoscopy due to history of polyps.       REVIEW OF SYSTEMS: Per the HPI, and otherwise unremarkable.    Historical Information   Past Medical History:   Diagnosis Date    Arthritis     Breast cyst     Chronic pain disorder     Colon polyp     Disorder of rotator cuff, right     Hypertension     Low back pain     PONV (postoperative nausea and vomiting)      Past Surgical History:   Procedure Laterality Date    BREAST BIOPSY Right      SECTION      COLONOSCOPY  2021    6 MONTH RECOMMENDED= LOW-GRADE DYSPLASIA    SHOULDER SURGERY      TONSILLECTOMY      TUBAL LIGATION       Social History   Social History     Substance and Sexual Activity   Alcohol Use Not Currently    Alcohol/week: 10.0 standard drinks of alcohol    Types: 10 Cans of beer per week    Comment: social     Social History     Substance and Sexual Activity   Drug Use No     Social History     Tobacco Use   Smoking Status Former    Current packs/day: 0.10    Average packs/day: 0.1 packs/day for 17.4 years (1.7 ttl pk-yrs)    Types: Cigarettes    Start date: 2008   Smokeless Tobacco Never     Family History   Problem Relation Age of Onset    Congenital heart disease Mother     Heart attack Mother         atrial     Congenital heart disease Father     No Known Problems Sister     No Known Problems Sister     No Known Problems Sister     No Known Problems Daughter     No Known Problems Maternal Grandmother     No Known Problems Maternal Grandfather     No Known Problems Paternal Grandmother     No Known Problems Paternal Grandfather     No Known Problems Son     No Known Problems Paternal Aunt     Breast cancer Neg Hx        Meds/Allergies       Current Outpatient Medications:     aspirin (ECOTRIN LOW STRENGTH) 81 mg EC tablet    atorvastatin (LIPITOR) 10 mg  tablet    olmesartan (BENICAR) 40 mg tablet    zolpidem (AMBIEN) 10 mg tablet    ibuprofen (MOTRIN) 800 mg tablet    Current Facility-Administered Medications:     lactated ringers infusion, 125 mL/hr, Intravenous, Continuous, 125 mL/hr at 04/24/25 1252    Allergies   Allergen Reactions    Diphenhydramine Hyperactivity    Gabapentin Eye Swelling and Rash       Objective     /84   Pulse 70   Temp 97.8 °F (36.6 °C) (Temporal)   Resp 20   SpO2 97%       PHYSICAL EXAM    Gen: NAD  Head: NCAT  CV: RRR  CHEST: Clear  ABD: soft, NT/ND  EXT: no edema      ASSESSMENT/PLAN:  This is a 70 y.o. year old female here for colonoscopy, and she is stable and optimized for her procedure. We discussed potential adverse events related with the procedure. They verbalized their understanding and are in agreement with proceeding with the procedure.

## 2025-04-24 NOTE — ANESTHESIA PREPROCEDURE EVALUATION
Procedure:  COLONOSCOPY    Relevant Problems   CARDIO   (+) Essential hypertension   (+) Hyperlipidemia      MUSCULOSKELETAL   (+) Acute right-sided low back pain with right-sided sciatica   (+) Chronic low back pain without sciatica      NEURO/PSYCH   (+) Chronic low back pain without sciatica        Physical Exam    Airway    Mallampati score: I  TM Distance: >3 FB  Neck ROM: full     Dental       Cardiovascular  Cardiovascular exam normal    Pulmonary  Pulmonary exam normal     Other Findings  post-pubertal.      Anesthesia Plan  ASA Score- 2     Anesthesia Type- IV sedation with anesthesia with ASA Monitors.         Additional Monitors:     Airway Plan:     Comment: Pt request IV zofran .       Plan Factors-Exercise tolerance (METS): >4 METS.    Chart reviewed.    Patient summary reviewed.    Patient is not a current smoker.              Induction- intravenous.    Postoperative Plan-     Perioperative Resuscitation Plan - Level 1 - Full Code.       Informed Consent- Anesthetic plan and risks discussed with patient.  I personally reviewed this patient with the CRNA. Discussed and agreed on the Anesthesia Plan with the CRNA..      NPO Status:  No vitals data found for the desired time range.

## 2025-04-24 NOTE — TELEPHONE ENCOUNTER
Requested Prescriptions     Pending Prescriptions Disp Refills    zolpidem (AMBIEN) 10 mg tablet [Pharmacy Med Name: ZOLPIDEM TARTRATE 10 MG TABLET] 30 tablet 2     Sig: TAKE 1 TABLET BY MOUTH DAILY AT BEDTIME AS NEEDED FOR SLEEP

## 2025-04-29 PROCEDURE — 88305 TISSUE EXAM BY PATHOLOGIST: CPT | Performed by: PATHOLOGY

## 2025-05-01 ENCOUNTER — RESULTS FOLLOW-UP (OUTPATIENT)
Dept: GASTROENTEROLOGY | Facility: CLINIC | Age: 70
End: 2025-05-01

## 2025-05-14 ENCOUNTER — TELEPHONE (OUTPATIENT)
Age: 70
End: 2025-05-14

## 2025-05-14 NOTE — TELEPHONE ENCOUNTER
Patient called the office regarding a bill she received in the mail. Patient states she spoke to the billing department and was advised to contact the office. Patient would like the provider to give her a call back directly. Please review and advise.

## 2025-05-15 ENCOUNTER — TELEPHONE (OUTPATIENT)
Age: 70
End: 2025-05-15

## 2025-05-15 DIAGNOSIS — R09.81 CHRONIC NASAL CONGESTION: Primary | ICD-10-CM

## 2025-05-15 NOTE — TELEPHONE ENCOUNTER
Patient called in requesting the advice of Primary Care Provider patient stated that when she was in office on 3/31 she discussed with Primary Care Provider the ongoing problem of discomfort patient states that she went to the dentist and the dentist stated that it was not sinus issues and that she should see an ENT patient has read Primary Care Provider and wants to know does patient really need an appointment about an issue that was already discussed patient states that this will delay her getting an appointment with ENT I offered an appointment patient declined patient is requesting a call back at [phone]  Please Advise

## 2025-05-15 NOTE — TELEPHONE ENCOUNTER
Patient called in stating the bill she received there were coding issues patient is requesting a call back at [phone]  Please Advise

## 2025-05-16 ENCOUNTER — HOSPITAL ENCOUNTER (OUTPATIENT)
Dept: RADIOLOGY | Facility: HOSPITAL | Age: 70
Discharge: HOME | End: 2025-05-16
Payer: MEDICARE

## 2025-05-16 ENCOUNTER — TELEPHONE (OUTPATIENT)
Age: 70
End: 2025-05-16

## 2025-05-16 DIAGNOSIS — R09.81 CHRONIC NASAL CONGESTION: ICD-10-CM

## 2025-05-16 PROCEDURE — 70220 X-RAY EXAM OF SINUSES: CPT

## 2025-05-16 NOTE — TELEPHONE ENCOUNTER
Patient called in wanted to know where she can go to have her XR Sinuses done. Patient given Cental Scheduling number to schedule. Informed she can go to any [facility] to have it done.

## 2025-05-18 ENCOUNTER — RESULTS FOLLOW-UP (OUTPATIENT)
Dept: FAMILY MEDICINE CLINIC | Facility: CLINIC | Age: 70
End: 2025-05-18

## 2025-05-19 DIAGNOSIS — R09.81 CHRONIC NASAL CONGESTION: Primary | ICD-10-CM

## 2025-05-21 ENCOUNTER — TELEPHONE (OUTPATIENT)
Age: 70
End: 2025-05-21

## 2025-05-21 NOTE — TELEPHONE ENCOUNTER
PT would like to make an ENT appt so I gave her the phone number for [facility] in [place], [phone] and transferred her

## 2025-06-21 DIAGNOSIS — G89.29 CHRONIC LOW BACK PAIN WITHOUT SCIATICA, UNSPECIFIED BACK PAIN LATERALITY: ICD-10-CM

## 2025-06-21 DIAGNOSIS — M54.50 CHRONIC LOW BACK PAIN WITHOUT SCIATICA, UNSPECIFIED BACK PAIN LATERALITY: ICD-10-CM

## 2025-06-22 RX ORDER — IBUPROFEN 800 MG/1
TABLET, FILM COATED ORAL
Qty: 60 TABLET | Refills: 0 | Status: SHIPPED | OUTPATIENT
Start: 2025-06-22

## 2025-07-02 ENCOUNTER — HOSPITAL ENCOUNTER (OUTPATIENT)
Dept: CT IMAGING | Facility: HOSPITAL | Age: 70
Discharge: HOME | End: 2025-07-02
Attending: PHYSICIAN ASSISTANT
Payer: MEDICARE

## 2025-07-02 DIAGNOSIS — D49.0 NASOPHARYNGEAL NEOPLASM: ICD-10-CM

## 2025-07-02 PROCEDURE — 70486 CT MAXILLOFACIAL W/O DYE: CPT

## 2025-07-08 DIAGNOSIS — E78.2 MIXED HYPERLIPIDEMIA: ICD-10-CM

## 2025-07-09 RX ORDER — ATORVASTATIN CALCIUM 10 MG/1
10 TABLET, FILM COATED ORAL 3 TIMES WEEKLY
Qty: 36 TABLET | Refills: 0 | Status: SHIPPED | OUTPATIENT
Start: 2025-07-09